# Patient Record
Sex: MALE | Race: WHITE
[De-identification: names, ages, dates, MRNs, and addresses within clinical notes are randomized per-mention and may not be internally consistent; named-entity substitution may affect disease eponyms.]

---

## 2017-06-15 ENCOUNTER — HOSPITAL ENCOUNTER (EMERGENCY)
Dept: HOSPITAL 74 - FER | Age: 82
Discharge: HOME | End: 2017-06-15
Payer: COMMERCIAL

## 2017-06-15 VITALS — BODY MASS INDEX: 32.5 KG/M2

## 2017-06-15 VITALS — HEART RATE: 67 BPM | DIASTOLIC BLOOD PRESSURE: 90 MMHG | SYSTOLIC BLOOD PRESSURE: 183 MMHG | TEMPERATURE: 97.8 F

## 2017-06-15 DIAGNOSIS — L08.9: Primary | ICD-10-CM

## 2017-06-15 DIAGNOSIS — Z79.01: ICD-10-CM

## 2017-06-15 DIAGNOSIS — I10: ICD-10-CM

## 2017-06-15 DIAGNOSIS — Z95.2: ICD-10-CM

## 2017-06-15 DIAGNOSIS — Z85.46: ICD-10-CM

## 2017-06-15 DIAGNOSIS — E78.00: ICD-10-CM

## 2017-06-15 PROCEDURE — 3E0234Z INTRODUCTION OF SERUM, TOXOID AND VACCINE INTO MUSCLE, PERCUTANEOUS APPROACH: ICD-10-PCS

## 2017-06-15 NOTE — PDOC
History of Present Illness





- General


Chief Complaint: Injury


Stated Complaint: INJURY FINGER


Time Seen by Provider: 06/15/17 09:33


History Source: Patient


Exam Limitations: No Limitations





- History of Present Illness


Initial Comments: 





06/15/17 10:23





81-year-old male with past medical history of hypertension, hyperlipidemia, 

prosthetic aortic valve presents with right fourth digit injury. He reports 

this occurred approximately one week ago. States that it object had actually 

crushed his fourth finger. He has sustained an injury but he put a bandage on 

it. Did not see a doctor or what the hospital at that time. Noticed today that 

the symptoms were getting worse and called his doctor who advised patient go to 

the ER. Patient denies any fevers or chills. Denies any numbness.





Past History





- Past Medical History


Allergies/Adverse Reactions: 


 Allergies











Allergy/AdvReac Type Severity Reaction Status Date / Time


 


No Known Allergies Allergy   Verified 09/04/12 15:19











Home Medications: 


Ambulatory Orders





Amlodipine Besylate [Norvasc -] 5 mg PO DAILY #0 tablet 09/11/12 


Rosuvastatin Calcium [Crestor] 5 mg PO DAILY #0 tablet 09/11/12 


Cholecalciferol (Vitamin D3) [Vitamin D3] 1 tab PO DAILY 06/18/14 


Metoprolol Succinate [Toprol XL -] 100 mg PO DAILY 06/18/14 


Olmesartan Medoxomil [Benicar -] 20 mg PO DAILY 06/18/14 


Amox-Tr/K Cl [Augmentin - 875Mg Tablet] 1 tab PO BID #20 tablet 06/15/17 


Apixaban [Eliquis] 5 mg PO DAILY 06/15/17 








Anemia: No


Asthma: No


Cancer: Yes (PROSTATE CA)


Cardiac Disorders: Yes (Aortic Valve Prothestic,Bovine)


CVA: No


COPD: No


CHF: No


Dementia: No


Diabetes: No


GI Disorders: No


 Disorders: No


HTN: Yes


Hypercholesterolemia: Yes


Liver Disease: No


Seizures: No


Thyroid Disease: No





- Surgical History


Abdominal Surgery: Yes (umbilical hernia)


Cardiac Surgery: Yes (AORTIC VALVE REPLACEMENT)


Lung Surgery: No


Neurologic Surgery: No


Orthopedic Surgery: No





- Psycho/Social/Smoking Cessation Hx


Anxiety: No


Suicidal Ideation: No


Smoking Status: No


Smoking History: Unknown if ever smoked


Have you smoked in the past 12 months: No


Number of Cigarettes Smoked Daily: 0


Information on smoking cessation initiated: No


Hx Alcohol Use: No


Drug/Substance Use Hx: No


Substance Use Type: None


Hx Substance Use Treatment: No





**Review of Systems





- Review of Systems


Able to Perform ROS?: Yes


Comments:: 





06/15/17 10:24





GENERAL/CONSTITUTIONAL: No fever, weakness. 


HEAD, EYES, EARS, NOSE AND THROAT: No change in vision. No ear pain or 

discharge. No sore throat.


CARDIOVASCULAR: No chest pain or shortness of breath.


RESPIRATORY: No cough, wheezing, or hemoptysis.


GASTROINTESTINAL: No abdominal pain, nausea, vomiting, diarrhea, or decreased 

PO intolerance. 


GENITOURINARY: No dysuria, frequency, or change in urination.


MUSCULOSKELETAL: No joint or muscle swelling or pain. No neck or back pain.


SKIN: right fourth digit injury


NEUROLOGIC: No headache, vertigo, loss of consciousness, or change in strength/

sensation.


ENDOCRINE: No increased thirst. No abnormal weight change.


HEMATOLOGIC/LYMPHATIC: No anemia, easy bleeding, or history of blood clots.


ALLERGIC/IMMUNOLOGIC: No hives or skin allergy. 





*Physical Exam





- Vital Signs


 Last Vital Signs











Temp Pulse Resp BP Pulse Ox


 


 97.8 F   67   16   183/90   98 


 


 06/15/17 09:29  06/15/17 09:29  06/15/17 09:29  06/15/17 09:29  06/15/17 09:29














- Physical Exam


Comments: 





06/15/17 10:24





GENERAL: Awake, alert, and fully oriented, in no acute distress. 


HEAD: No signs of trauma


EYES: PERRLA, EOMI, sclera anicteric, conjunctiva clear


ENT: Auricles normal inspection, hearing grossly normal, nares patent, 

oropharynx clear without exudates.  


NECK: Normal ROM, supple, no lymphadenopathy, JVD, or masses


LUNGS: Breath sounds equal, clear to auscultation bilaterally.  No wheezes, and 

no crackles


HEART: Regular rate and rhythm, normal S1 and S2, no murmurs, rubs or gallops


ABDOMEN: Soft, nontender, normoactive bowel sounds.  No guarding, no rebound.  

No masses


EXTREMITIES: Normal range of motion, no edema.  No clubbing or cyanosis. No 

cords, erythema, or tenderness


2+ radial pulse. Sensation intact throughout. Proximal nailbed avulsion from 

cuticle exposed. ~1 cm superficial proximal nailbed laceration. Foul smelling 

with mild drainage. Surrounding erythema noted. Rest of nailbed intact and 

adherent to distal digit. Pt able to flex and extend digit.


NEUROLOGICAL: Cranial nerves II through XII grossly intact.  Normal speech, 

normal gait


SKIN: Warm, Dry, normal turgor, no rashes or lesions noted.





ED Treatment Course





- RADIOLOGY


Radiology Studies Ordered: 














 Category Date Time Status


 


 FINGER(S) RIGHT [RAD] Stat Radiology  06/15/17 09:35 Taken














- Medications


Given in the ED: 


ED Medications














Discontinued Medications














Generic Name Dose Route Start Last Admin





  Trade Name Hakeemq  PRN Reason Stop Dose Admin


 


Amoxicillin/Clavulanate Potassium  1 tab 06/15/17 10:00 06/15/17 10:15





  Augmentin - 875mg Tablet  PO 06/15/17 10:01  1 tab





  ONCE ONE   Administration


 


Diphtheria/Tetanus/Acell Pertussis  0.5 ml 06/15/17 09:35 06/15/17 10:15





  Boostrix -  IM 06/15/17 09:36  0.5 ml





  .ONCE ONE   Administration














Medical Decision Making





- Medical Decision Making





06/15/17 10:31





 Vital Signs











Temp Pulse Resp BP Pulse Ox


 


 97.8 F   67   16   183/90   98 


 


 06/15/17 09:29  06/15/17 09:29  06/15/17 09:29  06/15/17 09:29  06/15/17 09:29








Pt with distal nailbed and 4th digit injury to dominant hand.


No apparent tendon injury with full flexion and extension.


Given injury occurred several days ago, pt will likely need to heal by 

secondary intention.


The wound appears to be infected at this time.


Patient overall nontoxic appearing though


Xray obtained of 4th digit. Pending official read. No fractures appreciated but 

noted with skin defect.


Tetanus ordered.


Case discussed with DR. Ferrera (hand surgeon).


Recommends that I initiate augmentin BID and hand soaks TID (half water/half 

hydrogen peroxide) and have the patient follow up in his [place] office 

tomorrow.





Pt verbalises understanding and agrees with plan.





I discussed the physical exam findings, ancillary test results and final 

diagnoses with the patient.  I answered all of the patient's questions.  The 

patient was satisfied with the care received and felt comfortable with the 

discharge plan and treatment plan.  The patient will call their primary care 

physician within 24 hours to arrange follow-up and will return to the Emergency 

Department with any new, persistant or worsening symptoms. 





*DC/Admit/Observation/Transfer


Diagnosis at time of Disposition: 


 Finger infection





- Discharge Dispostion


Disposition: HOME


Condition at time of disposition: Stable


Admit: No





- Prescriptions


Prescriptions: 


Amox-Tr/K Cl [Augmentin - 875Mg Tablet] 1 tab PO BID #20 tablet





- Referrals


Referrals: 


Nader Ferrera MD [Staff Physician] - 





- Patient Instructions


Printed Discharge Instructions:  DI for Nail Avulsion Injury


Additional Instructions: 


Your finger is infected.


I have discussed the case with the hand specialist DR. Ferrera.


He requests that he sees you in his office tomorrow morning.


Please call today to schedule an appointment for tomorrow.


When you speak to the , inform them that I have spoken to DR. Ferrera, and that DR. Ferrera is requesting that you are seen tomorrow.


Take the augmentin every 12 hours as prescribed.


Complete the course.


You may take 650 mg tylenol every 4 hours as needed for pain.


Soak your finger in a cup of half water and half hydrogen peroxide for 30 

minutes, three times a day.


Elevate the hand to decrease the swelling

## 2018-01-16 ENCOUNTER — HOSPITAL ENCOUNTER (EMERGENCY)
Dept: HOSPITAL 74 - JER | Age: 83
Discharge: HOME | End: 2018-01-16
Payer: COMMERCIAL

## 2018-01-16 VITALS — SYSTOLIC BLOOD PRESSURE: 151 MMHG | TEMPERATURE: 98 F | DIASTOLIC BLOOD PRESSURE: 94 MMHG | HEART RATE: 76 BPM

## 2018-01-16 VITALS — BODY MASS INDEX: 30.8 KG/M2

## 2018-01-16 DIAGNOSIS — K52.9: Primary | ICD-10-CM

## 2018-01-16 DIAGNOSIS — I10: ICD-10-CM

## 2018-01-16 DIAGNOSIS — E78.5: ICD-10-CM

## 2018-01-16 LAB
ALBUMIN SERPL-MCNC: 3.7 G/DL (ref 3.4–5)
ALP SERPL-CCNC: 88 U/L (ref 45–117)
ALT SERPL-CCNC: 18 U/L (ref 12–78)
ANION GAP SERPL CALC-SCNC: 10 MMOL/L (ref 8–16)
AST SERPL-CCNC: 14 U/L (ref 15–37)
BASOPHILS # BLD: 0.5 % (ref 0–2)
BILIRUB SERPL-MCNC: 1 MG/DL (ref 0.2–1)
BUN SERPL-MCNC: 19 MG/DL (ref 7–18)
CALCIUM SERPL-MCNC: 8.9 MG/DL (ref 8.5–10.1)
CHLORIDE SERPL-SCNC: 109 MMOL/L (ref 98–107)
CO2 SERPL-SCNC: 25 MMOL/L (ref 21–32)
CREAT SERPL-MCNC: 1.2 MG/DL (ref 0.7–1.3)
DEPRECATED RDW RBC AUTO: 12.8 % (ref 11.9–15.9)
EOSINOPHIL # BLD: 0.3 % (ref 0–4.5)
GLUCOSE SERPL-MCNC: 125 MG/DL (ref 74–106)
HCT VFR BLD CALC: 50.8 % (ref 35.4–49)
HGB BLD-MCNC: 17 GM/DL (ref 11.7–16.9)
LIPASE SERPL-CCNC: 250 U/L (ref 73–393)
LYMPHOCYTES # BLD: 2.3 % (ref 8–40)
MCH RBC QN AUTO: 30.8 PG (ref 25.7–33.7)
MCHC RBC AUTO-ENTMCNC: 33.4 G/DL (ref 32–35.9)
MCV RBC: 92.3 FL (ref 80–96)
MONOCYTES # BLD AUTO: 2.8 % (ref 3.8–10.2)
NEUTROPHILS # BLD: 94.1 % (ref 42.8–82.8)
PLATELET # BLD AUTO: 185 K/MM3 (ref 134–434)
PMV BLD: 7.7 FL (ref 7.5–11.1)
POTASSIUM SERPLBLD-SCNC: 4 MMOL/L (ref 3.5–5.1)
PROT SERPL-MCNC: 7.1 G/DL (ref 6.4–8.2)
RBC # BLD AUTO: 5.5 M/MM3 (ref 4–5.6)
SODIUM SERPL-SCNC: 144 MMOL/L (ref 136–145)
WBC # BLD AUTO: 7 K/MM3 (ref 4–10)

## 2018-01-16 NOTE — PDOC
History of Present Illness





- General


Chief Complaint: Vomiting/Diarrhea


Stated Complaint: DIARRHEA/VOMITING


Time Seen by Provider: 01/16/18 15:47





- History of Present Illness


Initial Comments: 


81-year-old male with past medical history of hypertension, hyperlipidemia, 

prosthetic aortic valve (on Apixaban) presents prsenting with nausea, vomiting, 

and diarrhea since this morning. States that it started at 6:00 this AM with a 6

/10 crampy generalized abdominal pain for which he immediately went to the 

bathroom and had multiple bouts of NBNB N/V/D and eventually a frontal headache 

described as 3/10 in severity, non-radiating and without visual symptoms, focal 

neurological deficits, or gait issues. Denies any extraordinary food ingestion, 

or recent sick symptoms. Of note, he just suffered the loss of a close friend 

and admits to some sadness but no significant distress or symptomatic 

depression.


01/16/18 16:30








Past History





- Past Medical History


Allergies/Adverse Reactions: 


 Allergies











Allergy/AdvReac Type Severity Reaction Status Date / Time


 


No Known Allergies Allergy   Verified 01/16/18 15:49











Home Medications: 


Ambulatory Orders





Amlodipine Besylate [Norvasc -] 5 mg PO DAILY #0 tablet 09/11/12 


Rosuvastatin Calcium [Crestor] 5 mg PO DAILY #0 tablet 09/11/12 


Cholecalciferol (Vitamin D3) [Vitamin D3] 1 tab PO DAILY 06/18/14 


Metoprolol Succinate [Toprol XL -] 100 mg PO DAILY 06/18/14 


Olmesartan Medoxomil [Benicar -] 20 mg PO DAILY 06/18/14 


Amox-Tr/K Cl [Augmentin - 875Mg Tablet] 1 tab PO BID #20 tablet 06/15/17 


Apixaban [Eliquis] 5 mg PO DAILY 06/15/17 


Ondansetron [Zofran *Odt*] 8 mg SL BID #14 od.tablet 01/16/18 








Anemia: No


Asthma: No


Cancer: Yes (PROSTATE CA)


Cardiac Disorders: Yes (Aortic Valve Prothestic,Bovine)


CVA: No


COPD: No


CHF: No


DVT: No


Dementia: No


Diabetes: No


GI Disorders: No


 Disorders: No


HTN: Yes


Hypercholesterolemia: Yes


Liver Disease: No


Seizures: No


Thyroid Disease: No





- Surgical History


Abdominal Surgery: Yes (umbilical hernia)


Cardiac Surgery: Yes (AORTIC VALVE REPLACEMENT)


Lung Surgery: No


Neurologic Surgery: No


Orthopedic Surgery: No





- Immunization History


Immunization Up to Date: Yes





- Suicide/Smoking/Psychosocial Hx


Smoking Status: No


Smoking History: Unknown if ever smoked


Have you smoked in the past 12 months: No


Number of Cigarettes Smoked Daily: 0


Information on smoking cessation initiated: No


Hx Alcohol Use: No


Drug/Substance Use Hx: No


Substance Use Type: None


Hx Substance Use Treatment: No





**Review of Systems





- Review of Systems


Constitutional: No: Chills, Diaphoresis, Fever


HEENTM: No: Eye Pain, Blurred Vision, Double Vision


Respiratory: No: Cough, Orthopnea, Shortness of Breath, Wheezing


Cardiac (ROS): No: Chest Pain, Edema


ABD/GI: Yes: Diarrhea, Nausea, Vomiting.  No: Constipated


: No: Dysuria


Musculoskeletal: No: Back Pain, Gout, Joint Pain


Integumentary: No: Bruising, Change in Color


Neurological: Yes: Headache.  No: Numbness, Paresthesia, Seizure, Tingling, 

Weakness


Psychiatric: No: Anxiety, Depression


Endocrine: No: Excessive Sweating





*Physical Exam





- Vital Signs


 Last Vital Signs











Temp Pulse Resp BP Pulse Ox


 


 98.0 F   81   17   181/106   96 


 


 01/16/18 15:49  01/16/18 15:49  01/16/18 15:49  01/16/18 15:49  01/16/18 15:49














- Physical Exam


General Appearance: Yes: Nourished, Appropriately Dressed.  No: Apparent 

Distress


HEENT: positive: EOMI, ELIZABETH, Normal ENT Inspection, Normal Voice


Neck: positive: Trachea midline, Normal Thyroid, Supple.  negative: Tender, 

Rigid


Respiratory/Chest: positive: Lungs Clear, Normal Breath Sounds.  negative: 

Chest Tender, Respiratory Distress, Accessory Muscle Use


Cardiovascular: positive: Regular Rhythm, Regular Rate


Gastrointestinal/Abdominal: positive: Normal Bowel Sounds, Flat, Soft.  negative

: Tender


Musculoskeletal: positive: Normal Inspection.  negative: CVA Tenderness


Extremity: positive: Normal Capillary Refill, Normal Inspection, Normal Range 

of Motion, Calf Tenderness.  negative: Tender


Integumentary: positive: Normal Color, Dry


Neurologic: positive: CNs II-XII NML intact, Fully Oriented, Alert, Normal Mood/

Affect, Normal Response, Motor Strength 5/5





ED Treatment Course





- LABORATORY


CBC & Chemistry Diagram: 


 01/16/18 16:12





 01/16/18 16:12





Medical Decision Making





- Medical Decision Making


 82 year old male with prosthetic valve (on Apixaban) presenting with N/V/D and 

abdominal pain this AM. No fevers, chills, or VS instability so overall 

presentation likely a viral gastroenteritis that has since resolved since 

receiving zofran,1 L IVNS and is now able to tolerate PO.


01/16/18 17:41





Labs all WNL, BP down to 150s with admin of home amlodipine. Will DC home with 

follow up instructions.


01/16/18 19:15








*DC/Admit/Observation/Transfer


Diagnosis at time of Disposition: 


 Gastroenteritis








- Discharge Dispostion


Disposition: HOME


Condition at time of disposition: Improved


Admit: No





- Prescriptions


Prescriptions: 


Ondansetron [Zofran *Odt*] 8 mg SL BID #14 od.tablet





- Referrals


Referrals: 


Christiana Cruz [Primary Care Provider] - 





- Patient Instructions


Printed Discharge Instructions:  DI for Viral Gastroenteritis -- Child


Additional Instructions: 


You likely have a viral stomach infection that will pass on its own. You should 

stay hydrated and use the nausea medication as instructed. Please return if you 

cannot keep your medication or food down despite using your nausea medication.





- Post Discharge Activity

## 2018-01-16 NOTE — PDOC
Attending Attestation





- Resident


Resident Name: Itzel Weller





- ED Attending Attestation


I have performed the following: I have examined & evaluated the patient, The 

case was reviewed & discussed with the resident, I agree w/resident's findings 

& plan, Exceptions are as noted





- HPI


HPI: 





01/16/18 18:21


83 yo male had nausea vomiting and diarrhea since this morning








- Physicial Exam


PE: 





01/16/18 18:22


83 yo male p/w NVD


head ncat


neck supple


cvs rrr s1s2


lungs cta b/l


abd soft,nontender,no rebound,no guarding


ext no deformity


skin warm and dry


neuro a xox3 ,ambulatory


psych appropriate








- Medical Decision Making





01/17/18 01:15


pt discharged  home

## 2018-01-16 NOTE — PDOC
Rapid Medical Evaluation


Time Seen by Provider: 01/16/18 15:47


Medical Evaluation: 


 Allergies











Allergy/AdvReac Type Severity Reaction Status Date / Time


 


No Known Allergies Allergy   Verified 09/04/12 15:19











01/16/18 15:48


I have performed a brief in-person evaluation of this patient.





The patient presents with a chief complaint of nausea, vomiting diarrhea


and a headache since this am . Patient states unable to keep food down





Pertinent physical exam findings:


NAD


lungs clear bilaterally


heart s1s2


abdomen with + bowel sounds no focal tenderness





I have ordered the following:


labs ordered





The patient will proceed to the Ed for further evaluation.

## 2018-01-26 ENCOUNTER — HOSPITAL ENCOUNTER (INPATIENT)
Dept: HOSPITAL 74 - FER | Age: 83
LOS: 4 days | Discharge: HOME | DRG: 194 | End: 2018-01-30
Attending: NURSE PRACTITIONER | Admitting: INTERNAL MEDICINE
Payer: COMMERCIAL

## 2018-01-26 VITALS — BODY MASS INDEX: 31.1 KG/M2

## 2018-01-26 DIAGNOSIS — E87.1: ICD-10-CM

## 2018-01-26 DIAGNOSIS — E86.0: ICD-10-CM

## 2018-01-26 DIAGNOSIS — J11.00: Primary | ICD-10-CM

## 2018-01-26 DIAGNOSIS — D72.819: ICD-10-CM

## 2018-01-26 DIAGNOSIS — B34.9: ICD-10-CM

## 2018-01-26 DIAGNOSIS — Z95.2: ICD-10-CM

## 2018-01-26 DIAGNOSIS — Z85.46: ICD-10-CM

## 2018-01-26 DIAGNOSIS — E78.5: ICD-10-CM

## 2018-01-26 DIAGNOSIS — I10: ICD-10-CM

## 2018-01-26 DIAGNOSIS — D69.6: ICD-10-CM

## 2018-01-26 LAB
ALBUMIN SERPL-MCNC: 3.9 G/DL (ref 3.5–5)
ALP SERPL-CCNC: 70 U/L (ref 32–92)
ALT SERPL-CCNC: 18 U/L (ref 10–40)
ANION GAP SERPL CALC-SCNC: 3 MMOL/L (ref 8–16)
AST SERPL-CCNC: 25 U/L (ref 10–42)
BACTERIA #/AREA URNS HPF: (no result) /HPF
BASOPHILS # BLD: 3.3 % (ref 0–2)
BILIRUB SERPL-MCNC: 0.5 MG/DL (ref 0.2–1)
BUN SERPL-MCNC: 17 MG/DL (ref 7–18)
CALCIUM SERPL-MCNC: 9.1 MG/DL (ref 8.4–10.2)
CHLORIDE SERPL-SCNC: 103 MMOL/L (ref 98–107)
CO2 SERPL-SCNC: 27 MMOL/L (ref 22–28)
COLOR UR: YELLOW
CREAT SERPL-MCNC: 1.3 MG/DL (ref 0.6–1.3)
DEPRECATED RDW RBC AUTO: 11.9 % (ref 11.9–15.9)
EOSINOPHIL # BLD: 1.5 % (ref 0–4.5)
GLUCOSE SERPL-MCNC: 113 MG/DL (ref 74–106)
HCT VFR BLD CALC: 46.8 % (ref 35.4–49)
HGB BLD-MCNC: 15.4 GM/DL (ref 11.7–16.9)
LYMPHOCYTES # BLD: 8 % (ref 8–40)
MCH RBC QN AUTO: 30.5 PG (ref 25.7–33.7)
MCHC RBC AUTO-ENTMCNC: 32.9 G/DL (ref 32–35.9)
MCV RBC: 92.7 FL (ref 80–96)
MONOCYTES # BLD AUTO: 9.6 % (ref 3.8–10.2)
NEUTROPHILS # BLD: 77.6 % (ref 42.8–82.8)
PH UR: 6.5 [PH] (ref 4.5–8)
PLATELET # BLD AUTO: 174 K/MM3 (ref 134–434)
PMV BLD: 7.9 FL (ref 7.5–11.1)
POTASSIUM SERPLBLD-SCNC: 4.4 MMOL/L (ref 3.5–5.1)
PROT SERPL-MCNC: 6.5 G/DL (ref 6.4–8.3)
RBC # BLD AUTO: (no result) /HPF (ref 0–3)
RBC # BLD AUTO: 5.05 M/MM3 (ref 4–5.6)
SODIUM SERPL-SCNC: 133 MMOL/L (ref 136–145)
SP GR UR: 1.02 (ref 1–1.02)
UROBILINOGEN UR STRIP-MCNC: 0.2 MG/DL (ref 0.2–1)
WBC # BLD AUTO: 6.7 K/MM3 (ref 4–10.8)
WBC # UR AUTO: (no result) /UL (ref 0–2)

## 2018-01-26 PROCEDURE — G0378 HOSPITAL OBSERVATION PER HR: HCPCS

## 2018-01-26 NOTE — HP
Admitting History and Physical





- Primary Care Physician


PCP: Christiana Cruz





- Admission


Chief Complaint: Fever,Chills, increased Confusion


History of Present Illness: 





This is a 81 y/o man who presents to the ED with fever, chills, lethargy since 

today. Patient's son reports his dad is acting confused and not at his usual 

baseline. Patient denies numbness, tingling, facial droop, slurred speech. 

Patient denies cough, SOB, dizziness, HA, CP, AP, N/V/D, constipation, dysuria





History Source: Family Member


Limitations to Obtaining History: Clinical Condition, Poor Historian





- Past Medical History


Cardiovascular: Yes: Aortic Insufficiency, HTN, Hyperlipdemia


Renal/: Yes: Cancer (prostate (seed implant))


Infectious Disease: Yes: MRSA (perianal abcess)





- Past Surgical History


Past Surgical History: Yes: Valve Replacement (aortic (on Eliquis))





- Smoking History


Smoking history: Never smoked


Have you smoked in the past 12 months: No


Aproximately how many cigarettes per day: 0





- Alcohol/Substance Use


Hx Alcohol Use: No


History of Substance Use: reports: None





- Social History


Usual Living Arrangement: Yes: Alone


ADL: Independent


History of Recent Travel: No





Home Medications





- Allergies


Allergies/Adverse Reactions: 


 Allergies











Allergy/AdvReac Type Severity Reaction Status Date / Time


 


No Known Allergies Allergy   Verified 01/16/18 15:49














- Home Medications


Home Medications: 


Ambulatory Orders





Rosuvastatin Calcium [Crestor] 5 mg PO DAILY #0 tablet 09/11/12 


Cholecalciferol (Vitamin D3) [Vitamin D3] 1 tab PO DAILY 06/18/14 


Metoprolol Succinate [Toprol XL -] 100 mg PO DAILY 06/18/14 


Apixaban [Eliquis] 5 mg PO DAILY 06/15/17 


Amiodarone HCl 100 mg PO DAILY 01/16/18 


Amlodipine Besylate [Norvasc -] 10 mg PO DAILY 01/16/18 


Furosemide 20 mg PO DAILY 01/16/18 


Olmesartan Medoxomil [Benicar] 20 mg PO DAILY 01/16/18 











Family Disease History





- Family Disease History


Family History: Unable to Obtain





Review of Systems





- Review of Systems


Constitutional: reports: Chills, Fever, Lethargy


Eyes: reports: No Symptoms


HENT: reports: No Symptoms


Neck: reports: No Symptoms


Cardiovascular: reports: No Symptoms


Respiratory: reports: No Symptoms


Gastrointestinal: reports: No Symptoms


Genitourinary: reports: No Symptoms


Breasts: reports: No Symptoms Reported


Musculoskeletal: reports: No Symptoms


Integumentary: reports: No Symptoms


Neurological: reports: Confusion (per son)


Endocrine: reports: No Symptoms


Hematology/Lymphatic: reports: No Symptoms


Psychiatric: reports: No Symptoms





Physical Examination


Vital Signs: 


 Vital Signs











Temperature  99.2 F   01/26/18 20:55


 


Pulse Rate  70   01/26/18 20:55


 


Respiratory Rate  20   01/26/18 20:55


 


Blood Pressure  176/90   01/26/18 20:55


 


O2 Sat by Pulse Oximetry (%)  95   01/26/18 20:55











Constitutional: Yes: Well Nourished, Obese


Eyes: Yes: WNL, Conjunctiva Clear, EOM Intact, PERRL


HENT: Yes: WNL, Atraumatic, Normocephalic


Neck: Yes: WNL, Supple, Trachea Midline


Cardiovascular: Yes: Regular Rate and Rhythm, Murmur, S1, S2


Respiratory: Yes: Diminished (bases), Wheezes (scattered)


Gastrointestinal: Yes: Normal Bowel Sounds, Abdomen, Obese


...Rectal Exam: Yes: Deferred


Renal/: Yes: WNL


Breast(s): Yes: WNL


Musculoskeletal: Yes: WNL


Extremities: Yes: WNL


Edema: No


Peripheral Pulses WNL: Yes


Integumentary: Yes: WNL


Neurological: Yes: WNL, Alert, Confusion, Cran Nerves II-XII Intact


...Motor Strength: WNL


Psychiatric: Yes: WNL, Alert


Labs: 


 CBC, BMP





 01/26/18 21:30 





 01/26/18 21:30 





 Laboratory Results - last 24 hr











  01/26/18 01/26/18 01/26/18





  21:30 21:30 21:30


 


WBC  6.7  


 


RBC  5.05  


 


Hgb  15.4  


 


Hct  46.8  


 


MCV  92.7  


 


MCH  30.5  


 


MCHC  32.9  


 


RDW  11.9  


 


Plt Count  174  


 


MPV  7.9  


 


Neutrophils %  77.6  


 


Lymphocytes %  8.0  


 


Monocytes %  9.6  


 


Eosinophils %  1.5  


 


Basophils %  3.3 H  


 


Sodium   133 L 


 


Potassium   4.4 


 


Chloride   103 


 


Carbon Dioxide   27 


 


Anion Gap   3 L 


 


BUN   17 


 


Creatinine   1.3 


 


Creat Clearance w eGFR   53.12 


 


Random Glucose   113 H 


 


Lactic Acid    1.0


 


Calcium   9.1 


 


Total Bilirubin   0.5 


 


AST   25 


 


ALT   18 


 


Alkaline Phosphatase   70 


 


Total Protein   6.5 


 


Albumin   3.9 


 


Urine Color   


 


Urine Appearance   


 


Urine pH   


 


Ur Specific Gravity   


 


Urine Protein   


 


Urine Glucose (UA)   


 


Urine Ketones   


 


Urine Blood   


 


Urine Nitrite   


 


Urine Bilirubin   


 


Urine Urobilinogen   


 


Ur Leukocyte Esterase   


 


Urine RBC   


 


Urine WBC   


 


Urine Bacteria   














  01/26/18





  23:26


 


WBC 


 


RBC 


 


Hgb 


 


Hct 


 


MCV 


 


MCH 


 


MCHC 


 


RDW 


 


Plt Count 


 


MPV 


 


Neutrophils % 


 


Lymphocytes % 


 


Monocytes % 


 


Eosinophils % 


 


Basophils % 


 


Sodium 


 


Potassium 


 


Chloride 


 


Carbon Dioxide 


 


Anion Gap 


 


BUN 


 


Creatinine 


 


Creat Clearance w eGFR 


 


Random Glucose 


 


Lactic Acid 


 


Calcium 


 


Total Bilirubin 


 


AST 


 


ALT 


 


Alkaline Phosphatase 


 


Total Protein 


 


Albumin 


 


Urine Color  Yellow


 


Urine Appearance  Clear


 


Urine pH  6.5


 


Ur Specific Gravity  1.020


 


Urine Protein  Trace


 


Urine Glucose (UA)  Negative


 


Urine Ketones  Negative


 


Urine Blood  Trace-intact H


 


Urine Nitrite  Negative


 


Urine Bilirubin  Negative


 


Urine Urobilinogen  0.2


 


Ur Leukocyte Esterase  Negative


 


Urine RBC  2-4


 


Urine WBC  0-2


 


Urine Bacteria  Few








 Intake & Output











 01/24/18 01/25/18 01/26/18 01/27/18





 23:59 23:59 23:59 23:59


 


Intake Total    1350


 


Output Total    300


 


Balance    1050


 


Weight   103.419 kg 98.475 kg














Imaging





- Results


Chest X-ray: Image Reviewed


EKG: Image Reviewed





Problem List





- Problems


(1) Pneumonia


Assessment/Plan: 


- Likely CAP vs HAP


- CURB65 Score- 2


- Chest xray- ? retrograde opacity


- Blood Cultures- pending


- Urine Cultures- pending


- Urine Legionella- pending


- Rapid flu- negative, given Tamiflu x1 in ED


- Zosyn, Vancomycin given in ED


- Will start Doxycycline, Ceftriaxone instead of Azithromycin 2/2 ECG- 

prolonged QT


- Monitor CBC


- Monitor vitals











Code(s): J18.9 - PNEUMONIA, UNSPECIFIED ORGANISM   


Qualifiers: 


   Pneumonia type: due to unspecified organism   Laterality: unspecified 

laterality   Lung location: lower lobe of lung   Qualified Code(s): J18.1 - 

Lobar pneumonia, unspecified organism   





(2) HTN (hypertension)


Assessment/Plan: 


- Controlled


- Monitor BP


- Continue Norvasc, Toprol Xl, Benicar with parameters


- Monitor renal function





Code(s): I10 - ESSENTIAL (PRIMARY) HYPERTENSION   





(3) HLD (hyperlipidemia)


Assessment/Plan: 


- Continue Crestor


- Monitor LFTs





Code(s): E78.5 - HYPERLIPIDEMIA, UNSPECIFIED   





(4) S/P aortic valve replacement


Assessment/Plan: 


- Continue Eliquis


Code(s): Z95.2 - PRESENCE OF PROSTHETIC HEART VALVE   





(5) DVT prophylaxis


Assessment/Plan: 


- OOB


- SCD


- Continue Eliqus


Code(s): KTD6187 -    





Assessment/Plan





This is a 81 y/o man with a PMHx of HTN, HLD, s/p AVR (on Eliquis), Prostate Ca 

(seed implant). Placed in Observation Pneumonia, weakness








FEN


- PO Fluids


- Replete lytes prn


- Low Na Diet








Code Status: Full Code








Dispo: Observation





Visit type





- Emergency Visit


Emergency Visit: Yes


ED Registration Date: 01/26/18


Care time: The patient presented to the Emergency Department on the above date 

and was hospitalized for further evaluation of their emergent condition.





- New Patient


This patient is new to me today: Yes


Date on this admission: 01/26/18





- Critical Care


Critical Care patient: No

## 2018-01-26 NOTE — PDOC
History of Present Illness





- General


History Source: Patient


Exam Limitations: No Limitations





- History of Present Illness


Initial Comments: 





01/26/18 21:27


The patient is an 82 year old male with pmhx of hypertension, hyperlipidemia, s/

p aortic valve replacement (on Eliquis), and prostate CA who presents to the ED 

with complaints of subjective fever for the past few days. He states his 

children sent him in because of the dizziness and chills he has been having. He 

states that when he stands up or changes position he becomes dizzy as if the 

room is spinning. He additionally complains of being more tired than usual. He 

reports being seen 10 days ago in the ED for a stomach illness which had 

resolved. The patient denies any nausea, vomiting, diarrhea, cough, SOB, CP, or 

urinary symptoms. 





<Kenna Hardy - Last Filed: 01/26/18 21:27>





<Santhosh Samson - Last Filed: 01/27/18 02:30>





- General


Chief Complaint: Lightheaded


Stated Complaint: CHILLS, DIZZINESS





Past History





<Kenna Hardy - Last Filed: 01/26/18 21:27>





- Past Medical History


Anemia: No


Asthma: No


Cancer: Yes (PROSTATE CA)


Cardiac Disorders: Yes (Aortic Valve Prothestic,Bovine)


CVA: No


COPD: No


CHF: No


DVT: No


Dementia: No


Diabetes: No


GI Disorders:  (RECENT GASTROENTERITIS)


 Disorders: No


HTN: Yes


Hypercholesterolemia: Yes


Liver Disease: No


Seizures: No


Thyroid Disease: No





- Surgical History


Abdominal Surgery: Yes (umbilical hernia)


Cardiac Surgery: Yes (AORTIC VALVE REPLACEMENT)


Lung Surgery: No


Neurologic Surgery: No


Orthopedic Surgery: No





- Immunization History


Immunization Up to Date: Yes





- Suicide/Smoking/Psychosocial Hx


Smoking Status: No


Smoking History: Never smoked


Have you smoked in the past 12 months: No


Number of Cigarettes Smoked Daily: 0


Hx Alcohol Use: No


Drug/Substance Use Hx: No


Substance Use Type: None


Hx Substance Use Treatment: No





<Santhosh Samson - Last Filed: 01/27/18 02:30>





- Past Medical History


Allergies/Adverse Reactions: 


 Allergies











Allergy/AdvReac Type Severity Reaction Status Date / Time


 


No Known Allergies Allergy   Verified 01/16/18 15:49











Home Medications: 


Ambulatory Orders





Rosuvastatin Calcium [Crestor] 5 mg PO DAILY #0 tablet 09/11/12 


Cholecalciferol (Vitamin D3) [Vitamin D3] 1 tab PO DAILY 06/18/14 


Metoprolol Succinate [Toprol XL -] 100 mg PO DAILY 06/18/14 


Apixaban [Eliquis] 5 mg PO DAILY 06/15/17 


Amiodarone HCl 100 mg PO DAILY 01/16/18 


Amlodipine Besylate [Norvasc -] 10 mg PO DAILY 01/16/18 


Furosemide 20 mg PO DAILY 01/16/18 


Olmesartan Medoxomil [Benicar] 20 mg PO DAILY 01/16/18 











**Review of Systems





- Review of Systems


Able to Perform ROS?: Yes


Comments:: 





01/26/18 21:27


GENERAL/CONSTITUTIONAL: 


Present: fever, chills, weakness 


HEAD, EYES, EARS, NOSE AND THROAT: No change in vision. No ear pain or 

discharge. No sore throat.


CARDIOVASCULAR: No chest pain or shortness of breath.


RESPIRATORY: No cough, wheezing, or hemoptysis.


GASTROINTESTINAL: No nausea, vomiting, diarrhea or constipation.


GENITOURINARY: No dysuria, frequency, or change in urination.


MUSCULOSKELETAL: No joint or muscle swelling or pain. No neck or back pain.


SKIN: No rash


NEUROLOGIC: 


Present: dizziness 


No headache, loss of consciousness, or change in strength/sensation.


ENDOCRINE: No increased thirst. No abnormal weight change.


HEMATOLOGIC/LYMPHATIC: No anemia, easy bleeding, or history of blood clots.


ALLERGIC/IMMUNOLOGIC: No hives or skin allergy.





All Other Systems: Reviewed and Negative





<Kenna Hardy - Last Filed: 01/26/18 21:27>





*Physical Exam





- Vital Signs


 Last Vital Signs











Temp Pulse Resp BP Pulse Ox


 


 99.2 F   70   20   176/90   95 


 


 01/26/18 20:55  01/26/18 20:55  01/26/18 20:55  01/26/18 20:55  01/26/18 20:55














- Physical Exam


Comments: 





01/26/18 21:28


GENERAL: Awake, alert, and fully oriented, in no acute distress


HEAD: No signs of trauma


EYES: PERRLA, EOMI, sclera anicteric, conjunctiva clear


ENT: Auricles normal inspection, hearing grossly normal, nares patent, 

oropharynx clear without 


exudates. Moist mucosa


NECK: Normal ROM, supple, no lymphadenopathy, JVD, or masses


LUNGS: Breath sounds equal, clear to auscultation bilaterally.  No wheezes, and 

no crackles


HEART: Regular rate and rhythm, normal S1 and S2, no murmurs, rubs or gallops


ABDOMEN: Soft, nontender, normoactive bowel sounds.  No guarding, no rebound.  

No masses


EXTREMITIES: Normal range of motion, no edema.  No clubbing or cyanosis. No 

cords, erythema, or tenderness


NEUROLOGICAL: Cranial nerves II through XII grossly intact.  Normal speech, 

normal gait


SKIN: Warm, Dry, normal turgor, no rashes or lesions noted.








<Kenna Hardy - Last Filed: 01/26/18 21:27>





- Vital Signs


 Last Vital Signs











Temp Pulse Resp BP Pulse Ox


 


 99.2 F   70   20   176/90   95 


 


 01/26/18 20:55  01/26/18 20:55  01/26/18 20:55  01/26/18 20:55  01/26/18 20:55














<Santhosh Samson - Last Filed: 01/27/18 02:30>





ED Treatment Course





- LABORATORY


CBC & Chemistry Diagram: 


 01/26/18 21:30





 01/26/18 21:30





<Santhosh Samson - Last Filed: 01/27/18 02:30>





Medical Decision Making





- Medical Decision Making





01/27/18 02:29


CXR--retrocardiac opacity, as read by me, referred to radiology for definitive 

read





a/p nosocomial pna (Overnight ED stay 10 days ago)


broad spectrum abx


fluid resusc





<Santhosh Samson - Last Filed: 01/27/18 02:30>





*DC/Admit/Observation/Transfer





- Attestations


Scribe Attestion: 





01/26/18 21:28


Documentation prepared by Kenna Hardy, acting as medical scribe for 

Santhosh Samson MD/DO.





<Kenna Hardy - Last Filed: 01/26/18 21:27>





- Discharge Dispostion


Admit: Yes





<Santhosh Samson - Last Filed: 01/27/18 02:30>


Diagnosis at time of Disposition: 


Pneumonia


Qualifiers:


 Pneumonia type: due to unspecified organism Laterality: unspecified laterality 

Lung location: lower lobe of lung Qualified Code(s): J18.1 - Lobar pneumonia, 

unspecified organism








- Discharge Dispostion


Condition at time of disposition: Stable

## 2018-01-27 LAB
ANION GAP SERPL CALC-SCNC: 6 MMOL/L (ref 8–16)
BASOPHILS # BLD: 0.9 % (ref 0–2)
BUN SERPL-MCNC: 14 MG/DL (ref 7–18)
CALCIUM SERPL-MCNC: 8.5 MG/DL (ref 8.4–10.2)
CHLORIDE SERPL-SCNC: 100 MMOL/L (ref 98–107)
CO2 SERPL-SCNC: 27 MMOL/L (ref 22–28)
CREAT SERPL-MCNC: 1.3 MG/DL (ref 0.6–1.3)
DEPRECATED RDW RBC AUTO: 11.8 % (ref 11.9–15.9)
EOSINOPHIL # BLD: 1.1 % (ref 0–4.5)
GLUCOSE SERPL-MCNC: 102 MG/DL (ref 74–106)
HCT VFR BLD CALC: 40.4 % (ref 35.4–49)
HGB BLD-MCNC: 13.9 GM/DL (ref 11.7–16.9)
LYMPHOCYTES # BLD: 9.5 % (ref 8–40)
MAGNESIUM SERPL-MCNC: 2 MG/DL (ref 1.8–2.4)
MCH RBC QN AUTO: 32.1 PG (ref 25.7–33.7)
MCHC RBC AUTO-ENTMCNC: 34.5 G/DL (ref 32–35.9)
MCV RBC: 92.9 FL (ref 80–96)
MONOCYTES # BLD AUTO: 14.2 % (ref 3.8–10.2)
NEUTROPHILS # BLD: 74.3 % (ref 42.8–82.8)
PHOSPHATE SERPL-MCNC: 2.8 MG/DL (ref 2.5–4.6)
PLATELET # BLD AUTO: 137 K/MM3 (ref 134–434)
PMV BLD: 7.4 FL (ref 7.5–11.1)
POTASSIUM SERPLBLD-SCNC: 3.8 MMOL/L (ref 3.5–5.1)
RBC # BLD AUTO: 4.34 M/MM3 (ref 4–5.6)
SODIUM SERPL-SCNC: 133 MMOL/L (ref 136–145)
WBC # BLD AUTO: 4 K/MM3 (ref 4–10.8)

## 2018-01-27 RX ADMIN — VITAMIN D, TAB 1000IU (100/BT) SCH UNIT: 25 TAB at 10:00

## 2018-01-27 RX ADMIN — ROSUVASTATIN CALCIUM SCH MG: 5 TABLET, FILM COATED ORAL at 21:29

## 2018-01-27 RX ADMIN — SODIUM CHLORIDE SCH MLS/HR: 9 INJECTION, SOLUTION INTRAVENOUS at 18:08

## 2018-01-27 RX ADMIN — TAZOBACTAM SODIUM AND PIPERACILLIN SODIUM SCH GM: 375; 3 INJECTION, SOLUTION INTRAVENOUS at 18:09

## 2018-01-27 RX ADMIN — METOPROLOL SUCCINATE SCH MG: 100 TABLET, EXTENDED RELEASE ORAL at 10:00

## 2018-01-27 RX ADMIN — OSELTAMIVIR PHOSPHATE SCH MG: 75 CAPSULE ORAL at 21:29

## 2018-01-27 RX ADMIN — AMLODIPINE BESYLATE SCH MG: 10 TABLET ORAL at 10:00

## 2018-01-27 RX ADMIN — OSELTAMIVIR PHOSPHATE SCH MG: 75 CAPSULE ORAL at 15:00

## 2018-01-27 RX ADMIN — AMIODARONE HYDROCHLORIDE SCH MG: 200 TABLET ORAL at 10:00

## 2018-01-27 NOTE — PN
Physical Exam: 


SUBJECTIVE: Patient seen and examined. Wife and son present. Patient states he 

is weak and feels unwell. Has been coughing for several days, productive, but 

does not know color of sputum. 








OBJECTIVE:





 Vital Signs











 Period  Temp  Pulse  Resp  BP Sys/Rosales  Pulse Ox


 


 Last 24 Hr 101 rectal  57-70  18-20  121-176/54-90  95-98











GENERAL: The patient is awake, alert, and fully oriented. Ill-appearing. 


LUNGS: Audibly wheezing; anterior bilateral wheezing; diminished breath sounds 

at bases  


HEART: Regular rate and rhythm, S1, S2 


ABDOMEN: Soft, nontender, nondistended, normoactive bowel sounds, no guarding, 

no rebound, no hepatosplenomegaly, no masses.


EXTREMITIES: 2+ pulses, warm, well-perfused, no edema. 


NEUROLOGICAL: Cranial nerves II through XII grossly intact. Normal speech, gait 

not observed.

















 Laboratory Results - last 24 hr











  01/26/18 01/26/18 01/26/18





  21:30 21:30 21:30


 


WBC  6.7  


 


RBC  5.05  


 


Hgb  15.4  


 


Hct  46.8  


 


MCV  92.7  


 


MCH  30.5  


 


MCHC  32.9  


 


RDW  11.9  


 


Plt Count  174  


 


MPV  7.9  


 


Neutrophils %  77.6  


 


Lymphocytes %  8.0  


 


Monocytes %  9.6  


 


Eosinophils %  1.5  


 


Basophils %  3.3 H  


 


Sodium   133 L 


 


Potassium   4.4 


 


Chloride   103 


 


Carbon Dioxide   27 


 


Anion Gap   3 L 


 


BUN   17 


 


Creatinine   1.3 


 


Creat Clearance w eGFR   53.12 


 


Random Glucose   113 H 


 


Lactic Acid    1.0


 


Calcium   9.1 


 


Phosphorus   


 


Magnesium   


 


Total Bilirubin   0.5 


 


AST   25 


 


ALT   18 


 


Alkaline Phosphatase   70 


 


Total Protein   6.5 


 


Albumin   3.9 


 


Urine Color   


 


Urine Appearance   


 


Urine pH   


 


Ur Specific Gravity   


 


Urine Protein   


 


Urine Glucose (UA)   


 


Urine Ketones   


 


Urine Blood   


 


Urine Nitrite   


 


Urine Bilirubin   


 


Urine Urobilinogen   


 


Ur Leukocyte Esterase   


 


Urine RBC   


 


Urine WBC   


 


Urine Bacteria   














  01/26/18 01/27/18 01/27/18





  23:26 07:38 07:38


 


WBC   4.0  D 


 


RBC   4.34 


 


Hgb   13.9 


 


Hct   40.4 


 


MCV   92.9 


 


MCH   32.1 


 


MCHC   34.5 


 


RDW   11.8 L 


 


Plt Count   137  D 


 


MPV   7.4 L 


 


Neutrophils %   74.3 


 


Lymphocytes %   9.5 


 


Monocytes %   14.2 H 


 


Eosinophils %   1.1 


 


Basophils %   0.9 


 


Sodium    133 L


 


Potassium    3.8


 


Chloride    100


 


Carbon Dioxide    27


 


Anion Gap    6 L


 


BUN    14


 


Creatinine    1.3


 


Creat Clearance w eGFR   


 


Random Glucose    102


 


Lactic Acid   


 


Calcium    8.5


 


Phosphorus    2.8


 


Magnesium    2.0


 


Total Bilirubin   


 


AST   


 


ALT   


 


Alkaline Phosphatase   


 


Total Protein   


 


Albumin   


 


Urine Color  Yellow  


 


Urine Appearance  Clear  


 


Urine pH  6.5  


 


Ur Specific Gravity  1.020  


 


Urine Protein  Trace  


 


Urine Glucose (UA)  Negative  


 


Urine Ketones  Negative  


 


Urine Blood  Trace-intact H  


 


Urine Nitrite  Negative  


 


Urine Bilirubin  Negative  


 


Urine Urobilinogen  0.2  


 


Ur Leukocyte Esterase  Negative  


 


Urine RBC  2-4  


 


Urine WBC  0-2  


 


Urine Bacteria  Few  








                        


 Current Medications











Generic Name Dose Route Start Last Admin





  Trade Name Freq  PRN Reason Stop Dose Admin


 


Amiodarone HCl  100 mg  01/27/18 10:00  01/27/18 10:00





  Cordarone -  PO   100 mg





  DAILY LARISA   Administration


 


Amlodipine Besylate  10 mg  01/27/18 10:00  01/27/18 10:00





  Norvasc -  PO   10 mg





  DAILY LARISA   Administration


 


Apixaban  5 mg  01/27/18 10:00  





  Eliquis -  PO   





  DAILY LARISA   


 


Cholecalciferol  1,000 unit  01/27/18 10:00  01/27/18 10:00





  Vitamin D3 -  PO   1,000 unit





  DAILY LARISA   Administration


 


Sodium Chloride  1,000 mls @ 100 mls/hr  01/27/18 15:39  





  Normal Saline -  IV   





  ASDIR LARISA   


 


Metoprolol Succinate  100 mg  01/27/18 10:00  01/27/18 10:00





  Toprol Xl -  PO   100 mg





  DAILY LARISA   Administration


 


Oseltamivir Phosphate  75 mg  01/27/18 15:00  01/27/18 15:00





  Tamiflu -  PO  02/01/18 14:59  75 mg





  BID LARISA   Administration


 


Piperacillin/Tazobactam/Dextrose  3.375 gm  01/27/18 18:00  





  Zosyn 3.375gm Ivpb (Premix)  IVPB   





  Q8H-IV LARISA   


 


Rosuvastatin Calcium  5 mg  01/27/18 22:00  





  Crestor -  PO   





  [facility]   














ASSESSMENT/PLAN


82 year-old male with a PMH significant for HTN, HLD, tissue aortic valve 

replacement on Eliquis, and prostate cancer. Admitted for fever, cough, and 

malaise likely secondary to influenza v. pneumonia.





Influenza


r/o pneumonia


   --clinical signs and symptoms: fever to 100.6, cough, hypoxic, and sudden 

onset of body aches and malaise 24 hours ago


   --flu swab negative but will treat clinical influenza with Tamiflu


   --CT chest negative for acute process


   --start empiric Vanc and Zosyn


   --ID consult requested


   --NS x 1L, then hourly





Hypertension


   --continue Toprol XL


   --hold amlodipine for borderline BP


   --hold home Lasix





Hyperlipidemia


   --continue Crestor





Tissue aortic valve replacement


   --continue Eliquis, amiodarone





Prostate cancer


   --no acute issues





FEN


   Fluids: NS @ 100mL/hr


   Electrolytes: replete as indicated


   Nutrition: low sodium





DVT prophylaxis: on Eliquis





Physical therapy





Dispo: continues to require inpatient care. Full code.














Visit type





- Emergency Visit


Emergency Visit: Yes


ED Registration Date: 01/26/18


Care time: The patient presented to the Emergency Department on the above date 

and was hospitalized for further evaluation of their emergent condition.





- New Patient


This patient is new to me today: Yes


Date on this admission: 01/27/18





- Critical Care


Critical Care patient: No

## 2018-01-28 LAB
ALBUMIN SERPL-MCNC: 3.2 G/DL (ref 3.5–5)
ALP SERPL-CCNC: 56 U/L (ref 32–92)
ALT SERPL-CCNC: 17 U/L (ref 10–40)
ANION GAP SERPL CALC-SCNC: 8 MMOL/L (ref 8–16)
AST SERPL-CCNC: 27 U/L (ref 10–42)
BASOPHILS # BLD: 0.6 % (ref 0–2)
BILIRUB SERPL-MCNC: 0.7 MG/DL (ref 0.2–1)
BUN SERPL-MCNC: 14 MG/DL (ref 7–18)
CALCIUM SERPL-MCNC: 8.6 MG/DL (ref 8.4–10.2)
CHLORIDE SERPL-SCNC: 101 MMOL/L (ref 98–107)
CO2 SERPL-SCNC: 26 MMOL/L (ref 22–28)
CREAT SERPL-MCNC: 1.4 MG/DL (ref 0.6–1.3)
DEPRECATED RDW RBC AUTO: 11.9 % (ref 11.9–15.9)
EOSINOPHIL # BLD: 0.5 % (ref 0–4.5)
GLUCOSE SERPL-MCNC: 153 MG/DL (ref 74–106)
HCT VFR BLD CALC: 45 % (ref 35.4–49)
HGB BLD-MCNC: 14.9 GM/DL (ref 11.7–16.9)
LYMPHOCYTES # BLD: 15.1 % (ref 8–40)
MAGNESIUM SERPL-MCNC: 2 MG/DL (ref 1.8–2.4)
MCH RBC QN AUTO: 30.9 PG (ref 25.7–33.7)
MCHC RBC AUTO-ENTMCNC: 33.2 G/DL (ref 32–35.9)
MCV RBC: 93.1 FL (ref 80–96)
MONOCYTES # BLD AUTO: 11.6 % (ref 3.8–10.2)
NEUTROPHILS # BLD: 72.2 % (ref 42.8–82.8)
PLATELET # BLD AUTO: 140 K/MM3 (ref 134–434)
PMV BLD: 8.6 FL (ref 7.5–11.1)
POTASSIUM SERPLBLD-SCNC: 3.6 MMOL/L (ref 3.5–5.1)
PROT SERPL-MCNC: 5.8 G/DL (ref 6.4–8.3)
RBC # BLD AUTO: 4.83 M/MM3 (ref 4–5.6)
SODIUM SERPL-SCNC: 135 MMOL/L (ref 136–145)
WBC # BLD AUTO: 3.7 K/MM3 (ref 4–10.8)

## 2018-01-28 RX ADMIN — IPRATROPIUM BROMIDE AND ALBUTEROL SULFATE SCH AMP: .5; 3 SOLUTION RESPIRATORY (INHALATION) at 21:20

## 2018-01-28 RX ADMIN — IPRATROPIUM BROMIDE AND ALBUTEROL SULFATE SCH AMP: .5; 3 SOLUTION RESPIRATORY (INHALATION) at 10:00

## 2018-01-28 RX ADMIN — OSELTAMIVIR PHOSPHATE SCH MG: 75 CAPSULE ORAL at 21:33

## 2018-01-28 RX ADMIN — IPRATROPIUM BROMIDE AND ALBUTEROL SULFATE SCH AMP: .5; 3 SOLUTION RESPIRATORY (INHALATION) at 14:10

## 2018-01-28 RX ADMIN — METOPROLOL SUCCINATE SCH MG: 100 TABLET, EXTENDED RELEASE ORAL at 10:05

## 2018-01-28 RX ADMIN — AMIODARONE HYDROCHLORIDE SCH MG: 200 TABLET ORAL at 09:00

## 2018-01-28 RX ADMIN — OSELTAMIVIR PHOSPHATE SCH MG: 75 CAPSULE ORAL at 11:58

## 2018-01-28 RX ADMIN — IPRATROPIUM BROMIDE AND ALBUTEROL SULFATE SCH AMP: .5; 3 SOLUTION RESPIRATORY (INHALATION) at 01:40

## 2018-01-28 RX ADMIN — CEFTRIAXONE SCH MLS/HR: 2 INJECTION, SOLUTION INTRAVENOUS at 12:06

## 2018-01-28 RX ADMIN — IPRATROPIUM BROMIDE AND ALBUTEROL SULFATE SCH AMP: .5; 3 SOLUTION RESPIRATORY (INHALATION) at 06:17

## 2018-01-28 RX ADMIN — TAZOBACTAM SODIUM AND PIPERACILLIN SODIUM SCH GM: 375; 3 INJECTION, SOLUTION INTRAVENOUS at 01:40

## 2018-01-28 RX ADMIN — SODIUM CHLORIDE SCH MLS/HR: 9 INJECTION, SOLUTION INTRAVENOUS at 16:11

## 2018-01-28 RX ADMIN — ROSUVASTATIN CALCIUM SCH MG: 5 TABLET, FILM COATED ORAL at 21:20

## 2018-01-28 RX ADMIN — VITAMIN D, TAB 1000IU (100/BT) SCH UNIT: 25 TAB at 10:05

## 2018-01-28 RX ADMIN — APIXABAN SCH MG: 5 TABLET, FILM COATED ORAL at 21:20

## 2018-01-28 NOTE — PN
Progress Note (short form)





- Note


Progress Note: 





ID Consult dictated


Probable acute influenza


Possible secondary bacterial pneumonitis


S/P AVR


Await sepsis work up


Continue empiric Tamiflu


Empiric ceftriaxone 2gm IVPB daily

## 2018-01-28 NOTE — PN
Physical Exam: 


SUBJECTIVE: Patient seen and examined sitting on edge of bed eating lunch. 

Feels much improved. Still with cough.








OBJECTIVE:





 Vital Signs











 Period  Temp  Pulse  Resp  BP Sys/Rosales  Pulse Ox


 


 Last 24 Hr  99.1 F-100.6 F  56-61  18-19  112-142/47-55  














GENERAL: The patient is awake, alert, and fully oriented, in no acute distress.


LUNGS: No wheezing appreciated; diminished breath sounds at the bases, mild 

bibasilar crackles


HEART: Regular rate and rhythm, S1, S2 


ABDOMEN: Soft, nontender, nondistended, normoactive bowel sounds, no guarding, 

no rebound


EXTREMITIES: 2+ pulses, warm, well-perfused, no edema. 


NEUROLOGICAL: Cranial nerves II through XII grossly intact. Normal speech, gait 

not observed.








                  


 Current Medications











Generic Name Dose Route Start Last Admin





  Trade Name Freq  PRN Reason Stop Dose Admin


 


Albuterol/Ipratropium  1 amp  01/27/18 22:00  01/28/18 14:10





  Duoneb -  NEB   1 amp





  Q4H LARISA   Administration


 


Amiodarone HCl  100 mg  01/27/18 10:00  01/28/18 09:00





  Cordarone -  PO   100 mg





  DAILY LARISA   Administration


 


Amlodipine Besylate  10 mg  01/27/18 10:00  01/27/18 10:00





  Norvasc -  PO   10 mg





  DAILY LARISA   Administration


 


Apixaban  5 mg  01/28/18 22:00  





  Eliquis -  PO   





  BID LARISA   


 


Cholecalciferol  1,000 unit  01/27/18 10:00  01/28/18 10:05





  Vitamin D3 -  PO   1,000 unit





  DAILY LARISA   Administration


 


Sodium Chloride  1,000 mls @ 100 mls/hr  01/27/18 15:39  01/28/18 16:11





  Normal Saline -  IV   100 mls/hr





  ASDIR LARISA   Administration


 


CEFTRIAXONE IN IS-OSM DEXTROSE  2 gm in 50 mls @ 100 mls/hr  01/28/18 11:00  01/ 28/18 12:06





  Ceftriaxone 2 Gm-D5w Bag  IVPB   100 mls/hr





  DAILY LARISA   Administration


 


Metoprolol Succinate  100 mg  01/27/18 10:00  01/28/18 10:05





  Toprol Xl -  PO   100 mg





  DAILY LARISA   Administration


 


Oseltamivir Phosphate  75 mg  01/27/18 15:00  01/28/18 11:58





  Tamiflu -  PO  02/01/18 14:59  75 mg





  BID LARISA   Administration


 


Rosuvastatin Calcium  5 mg  01/27/18 22:00  01/27/18 21:29





  Crestor -  PO   5 mg





  HS LARISA   Administration








ASSESSMENT/PLAN


82 year-old male with a PMH significant for HTN, HLD, tissue aortic valve 

replacement on Eliquis, and prostate cancer. Admitted for fever, cough, and 

malaise likely secondary to influenza v. pneumonia.





Influenza


r/o pneumonia


   --clinical signs and symptoms on admission consistent with influenza: fever 

to 100.6, cough, hypoxic, and sudden onset of body aches and malaise 


   --flu swab negative but will treat continue to treat for clinical influenza 

with Tamiflu


   --CT chest negative for acute process


   --per ID, continue ceftriaxone (day #2)


   


Hypertension


   --continue Toprol XL


   --BP has risen, resume amlodipine 


   --hold home Lasix





Hyperlipidemia


   --continue Crestor





Tissue aortic valve replacement


   --continue Eliquis, amiodarone





Prostate cancer


   --no acute issues





FEN


   Fluids: PO intake adequate


   Electrolytes: replete as indicated


   Nutrition: low sodium





DVT prophylaxis: on Eliquis





Physical therapy





Dispo: continues to require inpatient care. Full code.








Visit type





- Emergency Visit


Emergency Visit: Yes


ED Registration Date: 01/28/18


Care time: The patient presented to the Emergency Department on the above date 

and was hospitalized for further evaluation of their emergent condition.





- New Patient


This patient is new to me today: No





- Critical Care


Critical Care patient: No

## 2018-01-28 NOTE — CONS
DATE OF CONSULTATION:

 

DATE OF DICTATION:  01/28/2018

 

The patient is an 82-year-old male with a history of hypertension and aortic valve

replacement, evaluated for sepsis.  The patient was admitted to the hospital on

January 26, 2018, with several-day history of worsening fever.  The patient

complained of fever and chills at home associated with dizziness and generalized

weakness.  Family members had reported some altered mentation.  He was evaluated in

the emergency room, where he was noted to be ill appearing.  He stated that he became

dizzy when standing up or changing position.  He also complained of some vertigo. 

The patient had been seen in the emergency room approximately 10 days prior to

admission with a stomach issue, which has resolved. 

 

Influenza swab was performed in the emergency room and was negative.  Chest x-ray

showed possible right lower lobe infiltrate.  He was empirically treated with

vancomycin and Zosyn.  CAT scan of the chest was subsequently done and showed no

evidence of acute infiltrate.  At the present time, he is awake and alert, he reports

feeling better.  His mentation is improved.  He reports improvement in the dizziness.

 No complaints of high-grade fever, shaking chills, chest pain, shortness of breath,

cough, sputum production, vomiting or diarrhea.

 

He denies any ill contacts, lives at home with his wife, who is healthy, no recent

hospitalizations.

 

Past medical history positive for hypertension, hyperlipidemia, aortic valve

replacement, prostate cancer.

 

No known allergies.

 

MEDICATIONS:  Crestor, vitamin D, Toprol, Eliquis, amiodarone, Norvasc, Lasix,

Benicar.

 

SOCIAL HISTORY:  Lives at home with his wife.  No tobacco or alcohol use.

 

SYSTEMS REVIEW:

Neurologic:  No loss of consciousness, seizure activity, or focal weakness.

Cardiac:  Negative chest pain or palpitations.

Respiratory:  As per HPI.

Gastrointestinal:  Negative vomiting or diarrhea.

Genitourinary:  Negative for urinary tract infection.

 

LABORATORY DATA:  White count 4.0, hematocrit 40.4, platelet count 137.  BUN 14,

creatinine 1.3.  Urinalysis:  0 to 2 white cells.  CAT scan of the chest negative for

acute infiltrate.

 

PHYSICAL EXAMINATION:

General:  He is awake and alert.  He is not acutely toxic appearing.  His breathing

is nonlabored.

Vital Signs:  Temperature 99.5.  Blood pressure 123/47.  Pulse 58, regular. 

Respirations 18 per minute.  T-max 100.6.

ENT:  Sclerae anicteric.  Oropharynx negative.

Heart Sounds:  S1, S2.

Lungs:  Few rhonchi bilaterally.  No wheezing or rales.

Abdomen:  Soft.  No tenderness elicited.  No mass, rebound or rigidity.

Extremities:  1+ edema. 

 

IMPRESSION:

1.  Probable acute influenza versus viral respiratory tract infection.

2.  Possible secondary bacterial pneumonitis.

3.  Status post aortic valve replacement.

 

Await culture results.  Continue empiric Tamiflu.  Will also continue empiric

coverage for possible bacterial secondary infection with ceftriaxone 2 g IV piggyback

daily.  Will follow.

 

Thank you for the kind referral.

 

 

PARADISE CESPEDES M.D.

 

[id number]

DD: 01/28/2018 10:53

DT: 01/28/2018 12:17

Job #:  [id number]

## 2018-01-29 LAB
ALBUMIN SERPL-MCNC: 3.1 G/DL (ref 3.5–5)
ALP SERPL-CCNC: 52 U/L (ref 32–92)
ALT SERPL-CCNC: 16 U/L (ref 10–40)
ANION GAP SERPL CALC-SCNC: 8 MMOL/L (ref 8–16)
AST SERPL-CCNC: 22 U/L (ref 10–42)
BASOPHILS # BLD: 0.5 % (ref 0–2)
BILIRUB SERPL-MCNC: 0.5 MG/DL (ref 0.2–1)
BUN SERPL-MCNC: 13 MG/DL (ref 7–18)
CALCIUM SERPL-MCNC: 8.2 MG/DL (ref 8.4–10.2)
CHLORIDE SERPL-SCNC: 103 MMOL/L (ref 98–107)
CO2 SERPL-SCNC: 28 MMOL/L (ref 22–28)
CREAT SERPL-MCNC: 1.1 MG/DL (ref 0.6–1.3)
DEPRECATED RDW RBC AUTO: 12.1 % (ref 11.9–15.9)
EOSINOPHIL # BLD: 2.6 % (ref 0–4.5)
GLUCOSE SERPL-MCNC: 96 MG/DL (ref 74–106)
HCT VFR BLD CALC: 42.9 % (ref 35.4–49)
HGB BLD-MCNC: 14.4 GM/DL (ref 11.7–16.9)
LYMPHOCYTES # BLD: 18.3 % (ref 8–40)
MAGNESIUM SERPL-MCNC: 2 MG/DL (ref 1.8–2.4)
MCH RBC QN AUTO: 31.1 PG (ref 25.7–33.7)
MCHC RBC AUTO-ENTMCNC: 33.5 G/DL (ref 32–35.9)
MCV RBC: 92.9 FL (ref 80–96)
MONOCYTES # BLD AUTO: 13.9 % (ref 3.8–10.2)
NEUTROPHILS # BLD: 64.7 % (ref 42.8–82.8)
PLATELET # BLD AUTO: 128 K/MM3 (ref 134–434)
PMV BLD: 8.4 FL (ref 7.5–11.1)
POTASSIUM SERPLBLD-SCNC: 3.8 MMOL/L (ref 3.5–5.1)
PROT SERPL-MCNC: 5.6 G/DL (ref 6.4–8.3)
RBC # BLD AUTO: 4.62 M/MM3 (ref 4–5.6)
SODIUM SERPL-SCNC: 139 MMOL/L (ref 136–145)
WBC # BLD AUTO: 3.1 K/MM3 (ref 4–10.8)

## 2018-01-29 RX ADMIN — VITAMIN D, TAB 1000IU (100/BT) SCH UNIT: 25 TAB at 09:13

## 2018-01-29 RX ADMIN — AMIODARONE HYDROCHLORIDE SCH MG: 200 TABLET ORAL at 09:13

## 2018-01-29 RX ADMIN — IPRATROPIUM BROMIDE AND ALBUTEROL SULFATE SCH AMP: .5; 3 SOLUTION RESPIRATORY (INHALATION) at 14:50

## 2018-01-29 RX ADMIN — IPRATROPIUM BROMIDE AND ALBUTEROL SULFATE SCH AMP: .5; 3 SOLUTION RESPIRATORY (INHALATION) at 21:47

## 2018-01-29 RX ADMIN — VALSARTAN SCH MG: 160 TABLET, FILM COATED ORAL at 14:08

## 2018-01-29 RX ADMIN — AMLODIPINE BESYLATE SCH MG: 10 TABLET ORAL at 09:13

## 2018-01-29 RX ADMIN — OSELTAMIVIR PHOSPHATE SCH MG: 75 CAPSULE ORAL at 09:12

## 2018-01-29 RX ADMIN — APIXABAN SCH MG: 5 TABLET, FILM COATED ORAL at 09:13

## 2018-01-29 RX ADMIN — CEFTRIAXONE SCH MLS/HR: 2 INJECTION, SOLUTION INTRAVENOUS at 09:43

## 2018-01-29 RX ADMIN — METOPROLOL SUCCINATE SCH MG: 100 TABLET, EXTENDED RELEASE ORAL at 09:12

## 2018-01-29 RX ADMIN — ROSUVASTATIN CALCIUM SCH MG: 5 TABLET, FILM COATED ORAL at 21:47

## 2018-01-29 RX ADMIN — IPRATROPIUM BROMIDE AND ALBUTEROL SULFATE SCH AMP: .5; 3 SOLUTION RESPIRATORY (INHALATION) at 09:13

## 2018-01-29 RX ADMIN — OSELTAMIVIR PHOSPHATE SCH MG: 75 CAPSULE ORAL at 21:47

## 2018-01-29 RX ADMIN — APIXABAN SCH MG: 5 TABLET, FILM COATED ORAL at 21:47

## 2018-01-29 NOTE — PN
Progress Note (short form)





- Note


Progress Note: 





PULMONARY





CONSULTATION DICTATED 1/29/18





IMP VIRAL SYNDROME ? INFLUENZA


      DEHYDRATION


      HTN


      ASBESTOS PLEURAL DISEASE


      S/P AVR


      H/O PROSTATE CA





PLAN INHALED BRONCHODILATORS


        IV ANTIBIOTICS AS PER ID


        IVF


        CULTURES


        AC





        





DR HAGEN





Problem List





- Problems


(1) Viral syndrome


Code(s): B34.9 - VIRAL INFECTION, UNSPECIFIED   





(2) HLD (hyperlipidemia)


Code(s): E78.5 - HYPERLIPIDEMIA, UNSPECIFIED   





(3) HTN (hypertension)


Code(s): I10 - ESSENTIAL (PRIMARY) HYPERTENSION   





(4) S/P aortic valve replacement


Code(s): Z95.2 - PRESENCE OF PROSTHETIC HEART VALVE   





(5) Gastroenteritis


Code(s): K52.9 - NONINFECTIVE GASTROENTERITIS AND COLITIS, UNSPECIFIED   





(6) Dehydration


Code(s): E86.0 - DEHYDRATION

## 2018-01-29 NOTE — PN
Physical Exam: 


SUBJECTIVE: Patient seen and examined, sitting in bed, reports ongoing cough 





OBJECTIVE:82 year-old male with a PMH significant for HTN, HLD, tissue aortic 

valve replacement on Eliquis, and prostate cancer. Admitted for fever, cough, 

and malaise likely secondary to influenza v. pneumonia.





 Vital Signs











 Period  Temp  Pulse  Resp  BP Sys/Rosales  Pulse Ox


 


 Last 24 Hr  97.8 F-98.9 F  52-70  19-19  149-185/61-76  95-96











GENERAL: The patient is awake, alert, and fully oriented, in no acute distress.


HEAD: Normal with no signs of trauma.


EYES: PERRL, extraocular movements intact, sclera anicteric, conjunctiva clear. 

No ptosis. 


ENT: Ears normal, nares patent, oropharynx clear without exudates, moist mucous 


membranes.


NECK: Trachea midline, full range of motion, supple. 


LUNGS: Breath sounds equal, diminished at bases, course rhonchi to apexes, no 

wheezes, no crackles, no 


accessory muscle use. 


HEART: Regular rate and rhythm, S1, S2 without murmur, rub or gallop.


ABDOMEN: Soft, nontender, nondistended, normoactive bowel sounds, no guarding, 

no 


rebound, no hepatosplenomegaly, no masses.


EXTREMITIES: 2+ pulses, warm, well-perfused, no edema. 


NEUROLOGICAL: Cranial nerves II through XII grossly intact. Normal speech, gait 

not 


observed.


PSYCH: Normal mood, normal affect.


SKIN: Warm, dry, normal turgor, no rashes or lesions noted














 Laboratory Results - last 24 hr











  01/29/18 01/29/18





  07:00 07:00


 


WBC  3.1 L 


 


RBC  4.62 


 


Hgb  14.4 


 


Hct  42.9 


 


MCV  92.9 


 


MCH  31.1 


 


MCHC  33.5 


 


RDW  12.1 


 


Plt Count  128 L 


 


MPV  8.4 


 


Neutrophils %  64.7 


 


Lymphocytes %  18.3  D 


 


Monocytes %  13.9 H 


 


Eosinophils %  2.6  D 


 


Basophils %  0.5 


 


Sodium   139


 


Potassium   3.8


 


Chloride   103


 


Carbon Dioxide   28


 


Anion Gap   8


 


BUN   13


 


Creatinine   1.1  D


 


Creat Clearance w eGFR   > 60


 


Random Glucose   96  D


 


Calcium   8.2 L


 


Magnesium   2.0


 


Total Bilirubin   0.5  D


 


AST   22


 


ALT   16


 


Alkaline Phosphatase   52


 


Total Protein   5.6 L


 


Albumin   3.1 L








Active Medications











Generic Name Dose Route Start Last Admin





  Trade Name Freq  PRN Reason Stop Dose Admin


 


Albuterol/Ipratropium  1 amp  01/27/18 22:00  01/29/18 09:13





  Duoneb -  NEB   1 amp





  Q4H LARISA   Administration


 


Amiodarone HCl  100 mg  01/27/18 10:00  01/29/18 09:13





  Cordarone -  PO   100 mg





  DAILY LARISA   Administration


 


Amlodipine Besylate  10 mg  01/27/18 10:00  01/29/18 09:13





  Norvasc -  PO   10 mg





  DAILY LARISA   Administration


 


Apixaban  5 mg  01/28/18 22:00  01/29/18 09:13





  Eliquis -  PO   5 mg





  BID LARISA   Administration


 


Cholecalciferol  1,000 unit  01/27/18 10:00  01/29/18 09:13





  Vitamin D3 -  PO   1,000 unit





  DAILY LARISA   Administration


 


CEFTRIAXONE IN IS-OSM DEXTROSE  2 gm in 50 mls @ 100 mls/hr  01/28/18 11:00  01/ 29/18 09:43





  Ceftriaxone 2 Gm-D5w Bag  IVPB   100 mls/hr





  DAILY LARISA   Administration


 


Metoprolol Succinate  100 mg  01/27/18 10:00  01/29/18 09:12





  Toprol Xl -  PO   100 mg





  DAILY LARISA   Administration


 


Oseltamivir Phosphate  75 mg  01/27/18 15:00  01/29/18 09:12





  Tamiflu -  PO  02/01/18 14:59  75 mg





  BID LARISA   Administration


 


Rosuvastatin Calcium  5 mg  01/27/18 22:00  01/28/18 21:20





  Crestor -  PO   5 mg





  HS LARISA   Administration








 Microbiology





01/26/18 21:30   Blood - Peripheral Venous   Blood Culture - Preliminary


                            NO GROWTH OBTAINED AFTER 48 HOURS, INCUBATION TO 

CONTINUE


                            FOR 3 DAYS.


01/26/18 21:45   Blood - Peripheral Venous   Blood Culture - Preliminary


                            NO GROWTH OBTAINED AFTER 48 HOURS, INCUBATION TO 

CONTINUE


                            FOR 3 DAYS.


01/26/18 23:26   Urine - Urine Clean Catch   Urine Culture - Final


01/26/18 23:26   Urine For Antigen Detection   Legionella Antigen - Final


01/26/18 23:26   Urine For Antigen Detection   Streptococcus pneumoniae Antigen 

(M - Final,  negative


01/26/18 21:30   Nasopharyngeal Swab   Influenza Types A,B Antigen (CARLI) - Final

, negative 


01/26/18 21:30   Nasopharyngeal Swab    - Final








ASSESSMENT/PLAN:





1) ID


 Influenza


r/o pneumonia


- pt is afebrile, no leukocytosis, however, clinical signs are consistent with 

the flu upon admission, continue tamiflu


- -CT chest negative for acute process, -per ID, continue ceftriaxone (day #3)


- appreciate pulmonary input





2) cardiovascular


Hypertension


-  continue Toprol XL,and amlodipine, b/p above goal restart benicar


Hyperlipidemia


--continue Crestor


Tissue aortic valve replacement


- continue Eliquis, amiodarone





3) heme/onc


Prostate cancer


-no acute issues





FEN


   Fluids: PO intake adequate


   Electrolytes: replete as indicated


   Nutrition: low sodium





DVT prophylaxis: on Eliquis





Physical therapy





Dispo: continues to require inpatient care. Full code.





Visit type





- Emergency Visit


Emergency Visit: Yes


ED Registration Date: 01/28/18


Care time: The patient presented to the Emergency Department on the above date 

and was hospitalized for further evaluation of their emergent condition.





- New Patient


This patient is new to me today: Yes


Date on this admission: 01/29/18





- Critical Care


Critical Care patient: No





- Discharge Referral


Referred to [facility] No

## 2018-01-29 NOTE — CONS
DATE OF CONSULTATION:  01/29/2018

 

PULMONARY CONSULTATION 

 

REFERRING PHYSICIAN:  Martita Mejia N.P. 

 

HISTORY OF PRESENT ILLNESS:  The patient is an 82-year-old white male with a past

medical history hypertension, status post aortic valve replacement in 2008, history

of prostate CA status post surgery radiotherapy seed implants, history of smoking

many, many years ago, hypertension, hyperlipidemia, admitted to [facility] on January 26 secondary to generalized fever, chills, associated with

nausea, dizziness, diarrhea, nausea, and vomiting.  According to the family, the

patient noted he had an altered mentation.  Presented to the emergency room.  At the

time he was noted to be ill appearing.  He had a flu screen, which was negative. 

Apparently he became dizzy when changing position.  He was admitted with above . On

admission, he was started on antibiotics as well as Tamiflu for possible influenza. 

He had chest CT performed which revealed no evidence of acute infiltrate but revealed

evidence of bilateral pleural thickening, calcification consistent with asbestos

pleural disease.  Patient denies any history of COPD, asthma.  Denies any chest pain,

palpitations.  Denies any cough or hemoptysis.  He states that he had a cough

yesterday when using inhaled bronchodilator.  He states he is still employed as a

 and works daily.  He does complain of dyspnea on exertion walking up inclines

_____ level ground.  He denies orthopnea or PND.  

 

PAST MEDICAL HISTORY:  Includes hypertension, prostate CA, status post aortic valve

replacement.

 

SOCIAL HISTORY:  Currently  by profession.  History of tobacco greater than 50

years ago.  No recent travel.  

 

CURRENT MEDICATIONS:  Include Cordarone, Eliquis, DuoNeb, Toprol, ceftriaxone,

Norvasc, Crestor, Tamiflu, and vitamin D3.  

 

REVIEW OF SYSTEMS:  No orthopnea.  No PND.  Positive nausea.  Positive vomiting. 

Positive diarrhea.  No chest pain.  No palpitations.  

 

PHYSICAL EXAMINATION: 

General:  The patient is a well-developed, well-nourished male, awake, alert, in no

acute distress.  

Vital signs:  He is afebrile.  Blood pressure 168/76, respirations 19, and O2

saturation is 96% on room air.  

HEENT:  Head is normocephalic, atraumatic.  

Neck:  Supple.  

Heart:  Regular.  S1, S2.  

Chest:  Clear.

Abdomen:  Soft.  Bowel sounds positive.  

Extremities:  No cyanosis, edema. 

 

LABORATORY:  WBC is 3.1, hemoglobin is 14.4, hematocrit 42.9, platelet count of

128,000.  BUN 13, creatinine 1.1.  Chest CT as noted earlier, no infiltrates, no

effusions, bilateral pleural thickening and calcification consistent with previous

asbestos exposure.  

 

IMPRESSION:  

1.  Likely viral syndrome, possibly influenza.  Upper respiratory infection. 

2.  Status post aortic valve replacement.  

3.  Hypertension.

4.  Prostate carcinoma.

5.  Dehydration.

6.  Asbestos pleural disease.  

 

PLAN:  IV fluids.  Inhaled bronchodilators.  Antibiotics as per infectious disease. 

Follow up cultures.  

 

 

VIJAYA HAGEN M.D.

 

[id number]

DD: 01/29/2018 12:18

DT: 01/29/2018 19:17

Job #:  [id number]

## 2018-01-30 VITALS — SYSTOLIC BLOOD PRESSURE: 142 MMHG | DIASTOLIC BLOOD PRESSURE: 78 MMHG | HEART RATE: 58 BPM

## 2018-01-30 VITALS — TEMPERATURE: 98 F

## 2018-01-30 RX ADMIN — VITAMIN D, TAB 1000IU (100/BT) SCH UNIT: 25 TAB at 09:38

## 2018-01-30 RX ADMIN — AMIODARONE HYDROCHLORIDE SCH MG: 200 TABLET ORAL at 09:38

## 2018-01-30 RX ADMIN — AMLODIPINE BESYLATE SCH MG: 10 TABLET ORAL at 09:38

## 2018-01-30 RX ADMIN — APIXABAN SCH MG: 5 TABLET, FILM COATED ORAL at 09:38

## 2018-01-30 RX ADMIN — METOPROLOL SUCCINATE SCH MG: 100 TABLET, EXTENDED RELEASE ORAL at 09:38

## 2018-01-30 RX ADMIN — IPRATROPIUM BROMIDE AND ALBUTEROL SULFATE SCH AMP: .5; 3 SOLUTION RESPIRATORY (INHALATION) at 03:26

## 2018-01-30 RX ADMIN — VALSARTAN SCH MG: 160 TABLET, FILM COATED ORAL at 09:38

## 2018-01-30 RX ADMIN — IPRATROPIUM BROMIDE AND ALBUTEROL SULFATE SCH AMP: .5; 3 SOLUTION RESPIRATORY (INHALATION) at 05:28

## 2018-01-30 RX ADMIN — IPRATROPIUM BROMIDE AND ALBUTEROL SULFATE SCH AMP: .5; 3 SOLUTION RESPIRATORY (INHALATION) at 10:40

## 2018-01-30 RX ADMIN — OSELTAMIVIR PHOSPHATE SCH MG: 75 CAPSULE ORAL at 09:38

## 2018-01-30 NOTE — PN
Progress Note, Physician


History of Present Illness: 





Clinically improved


Awake, alert


No c/o chest pain/ dyspnea/ cough


No c/o fever/ chills


Afebrile WBC 3.1   13% M   plt 128


BC (-)    CT chest no infiltrate





- Current Medication List


Current Medications: 


Active Medications





Albuterol/Ipratropium (Duoneb -)  1 amp NEB Q4H [facility]


   Last Admin: 01/30/18 05:28 Dose:  1 amp


Amiodarone HCl (Cordarone -)  100 mg PO DAILY [facility]


   Last Admin: 01/30/18 09:38 Dose:  100 mg


Amlodipine Besylate (Norvasc -)  10 mg PO DAILY [facility]


   Last Admin: 01/30/18 09:38 Dose:  10 mg


Apixaban (Eliquis -)  5 mg PO BID [facility]


   Last Admin: 01/30/18 09:38 Dose:  5 mg


Cholecalciferol (Vitamin D3 -)  1,000 unit PO DAILY [facility]


   Last Admin: 01/30/18 09:38 Dose:  1,000 unit


Ceftriaxone Sodium (Rocephin 2gm Ivpb (Pre-Docked))  2 gm in 100 mls @ 200 mls/

hr IVPB DAILY [facility]


   Last Admin: 01/30/18 09:38 Dose:  200 mls/hr


Metoprolol Succinate (Toprol Xl -)  100 mg PO DAILY [facility]


   Last Admin: 01/30/18 09:38 Dose:  100 mg


Oseltamivir Phosphate (Tamiflu -)  75 mg PO BID [facility]


   Stop: 02/01/18 14:59


   Last Admin: 01/30/18 09:38 Dose:  75 mg


Rosuvastatin Calcium (Crestor -)  5 mg PO HS [facility]


   Last Admin: 01/29/18 21:47 Dose:  5 mg


Valsartan (Diovan -)  160 mg PO DAILY [facility]


   Last Admin: 01/30/18 09:38 Dose:  160 mg











- Objective


Vital Signs: 


 Vital Signs











Temperature  98.0 F   01/30/18 09:39


 


Pulse Rate  58 L  01/30/18 09:39


 


Respiratory Rate  20   01/30/18 09:39


 


Blood Pressure  142/78   01/30/18 09:39


 


O2 Sat by Pulse Oximetry (%)  97   01/30/18 06:00











Constitutional: Yes: No Distress


Eyes: Yes: Conjunctiva Clear


Cardiovascular: Yes: Regular Rate and Rhythm, S1, S2


Respiratory: Yes: Rhonchi, Other (few scatterred rhonchi)


Gastrointestinal: Yes: Normal Bowel Sounds, Soft, Abdomen, Obese.  No: 

Tenderness


Edema: No


Labs: 


 CBC, BMP





 01/29/18 07:00 





 01/29/18 07:00 











Assessment/Plan





Probable acute viral syndrome/ inflenza


Leukopenia/ monocytosis/ thrombocytopenia secondary to viral infection


Dehydration


S/P AVR


May switch to ceftin 500mg po bid x 3d


Complete 5d course of Tamiflu

## 2018-01-30 NOTE — DS
Physical Exam: 


SUBJECTIVE: Patient seen and examined








OBJECTIVE:


This is a 81 y/o man who presents to the ED with fever, chills, lethargy since 

today. Patient's son reports his dad is acting confused and not at his usual 

baseline. Patient denies numbness, tingling, facial droop, slurred speech. 

Patient denies cough, SOB, dizziness, HA, CP, AP, N/V/D, constipation, dysuria








 Vital Signs











 Period  Temp  Pulse  Resp  BP Sys/Rosales  Pulse Ox


 


 Last 24 Hr  97.9 F-98.3 F  50-61  18-20  139-161/66-92  94-97








PHYSICAL EXAM





GENERAL: The patient is awake, alert, and fully oriented, in no acute distress.


HEAD: Normal with no signs of trauma.


EYES: PERRL, extraocular movements intact, sclera anicteric, conjunctiva clear. 


ENT: Ears normal, nares patent, oropharynx clear without exudates, moist mucous 

membranes.


NECK: Trachea midline, full range of motion, supple. 


LUNGS: Breath sounds equal, clear to auscultation bilaterally, no wheezes, no 

crackles, no accessory muscle use. 


HEART: Regular rate and rhythm, S1, S2 without murmur, rub or gallop.


ABDOMEN: Soft, nontender, nondistended, normoactive bowel sounds, no guarding, 

no rebound, no hepatosplenomegaly, no masses.


EXTREMITIES: 2+ pulses, warm, well-perfused, no edema. 


NEUROLOGICAL: Cranial nerves II through XII grossly intact. Normal speech, gait 

not observed.


PSYCH: Normal mood, normal affect.


SKIN: Warm, dry, normal turgor, no rashes or lesions noted.





LABS


 CBC











WBC  3.1 K/mm3 (4.0-10.8)  L  01/29/18  07:00    


 


RBC  4.62 M/mm3 (4.00-5.60)   01/29/18  07:00    


 


Hgb  14.4 GM/dl (11.7-16.9)   01/29/18  07:00    


 


Hct  42.9 % (35.4-49)   01/29/18  07:00    


 


MCV  92.9 fl (80-96)   01/29/18  07:00    


 


MCH  31.1 pg (25.7-33.7)   01/29/18  07:00    


 


MCHC  33.5 g/dl (32.0-35.9)   01/29/18  07:00    


 


RDW  12.1 % (11.9-15.9)   01/29/18  07:00    


 


Plt Count  128 K/MM3 (134-434)  L  01/29/18  07:00    


 


MPV  8.4 fl (7.5-11.1)   01/29/18  07:00    


 


Neutrophils %  64.7 % (42.8-82.8)   01/29/18  07:00    


 


Lymphocytes %  18.3 % (8-40)  D 01/29/18  07:00    


 


Monocytes %  13.9 % (3.8-10.2)  H  01/29/18  07:00    


 


Eosinophils %  2.6 % (0-4.5)  D 01/29/18  07:00    


 


Basophils %  0.5 % (0-2.0)   01/29/18  07:00    








 CMP











Sodium  139 mmol/L (136-145)   01/29/18  07:00    


 


Potassium  3.8 mmol/L (3.5-5.1)   01/29/18  07:00    


 


Chloride  103 mmol/L ()   01/29/18  07:00    


 


Carbon Dioxide  28 mmol/L (22-28)   01/29/18  07:00    


 


Anion Gap  8  (8-16)   01/29/18  07:00    


 


BUN  13 mg/dl (7-18)   01/29/18  07:00    


 


Creatinine  1.1 mg/dl (0.6-1.3)  D 01/29/18  07:00    


 


Creat Clearance w eGFR  > 60  (>60)   01/29/18  07:00    


 


Random Glucose  96 mg/dl ()  D 01/29/18  07:00    


 


Lactic Acid  1.0 mmol/L (0.0-2.0)   01/26/18  21:30    


 


Calcium  8.2 mg/dl (8.4-10.2)  L  01/29/18  07:00    


 


Phosphorus  2.8 mg/dl (2.5-4.6)   01/27/18  07:38    


 


Magnesium  2.0 mg/dL (1.8-2.4)   01/29/18  07:00    


 


Total Bilirubin  0.5 mg/dl (0.2-1.0)  D 01/29/18  07:00    


 


AST  22 U/L (10-42)   01/29/18  07:00    


 


ALT  16 U/L (10-40)   01/29/18  07:00    


 


Alkaline Phosphatase  52 U/L (32-92)   01/29/18  07:00    


 


Total Protein  5.6 g/dl (6.4-8.3)  L  01/29/18  07:00    


 


Albumin  3.1 g/dl (3.5-5.0)  L  01/29/18  07:00    








 Microbiology





01/26/18 21:30   Blood - Peripheral Venous   Blood Culture - Preliminary


                            NO GROWTH OBTAINED AFTER 72 HOURS, INCUBATION TO 

CONTINUE


                            FOR 2 DAYS.


01/26/18 21:45   Blood - Peripheral Venous   Blood Culture - Preliminary


                            NO GROWTH OBTAINED AFTER 72 HOURS, INCUBATION TO 

CONTINUE


                            FOR 2 DAYS.


01/26/18 23:26   Urine - Urine Clean Catch   Urine Culture - Final


01/26/18 23:26   Urine For Antigen Detection   Legionella Antigen - Final, 

negative


01/26/18 23:26   Urine For Antigen Detection   Streptococcus pneumoniae Antigen 

(M - Final, negative 


01/26/18 21:30   Nasopharyngeal Swab   Influenza Types A,B Antigen (CARLI) - Final

, negative


01/26/18 21:30   Nasopharyngeal Swab    - Final, negative 








HOSPITAL COURSE:


1) ID


 Influenza


r/o pneumonia


- pt is afebrile, no leukocytosis, however, clinical signs are consistent with 

the flu upon admission, continue tamiflu


- -CT chest negative for acute process, -per ID, continue ceftriaxone (day #3)


- appreciate pulmonary input





2) cardiovascular


Hypertension


-  continue Toprol XL,and amlodipine, b/p above goal restart benicar


Hyperlipidemia


--continue Crestor


Tissue aortic valve replacement


- continue Eliquis, amiodarone





3) heme/onc


Prostate cancer


-no acute issues


Date of Admission:01/28/18





Date of Discharge: 01/30/18











Minutes to complete discharge: 45





Discharge Summary


Reason For Visit: PNEUMONIA


Current Active Problems





DVT prophylaxis (Acute)


Dehydration (Acute)


HLD (hyperlipidemia) (Acute)


HTN (hypertension) (Acute)


Pneumonia (Acute)


S/P aortic valve replacement (Acute)


Viral syndrome (Acute)








Condition: Stable





- Instructions


Diet, Activity, Other Instructions: 


continue tamiflu and ceftin as prescribed


continue all medications as prescribed


please follow up with your primary care physician Dr Cruz within 1 week


if any new or persistent symptoms develop please return to the emergency 

department 


Referrals: 


Androne,Christiana S [Primary Care Provider] - 


Disposition: HOME





- Home Medications


Comprehensive Discharge Medication List: 


Ambulatory Orders





RX: Rosuvastatin Calcium [Crestor] 5 mg PO DAILY #0 tablet 09/11/12 


RX: Cholecalciferol (Vitamin D3) [Vitamin D3] 1 tab PO DAILY 06/18/14 


RX: Metoprolol Succinate [Toprol XL -] 100 mg PO DAILY 06/18/14 


Apixaban [Eliquis] 5 mg PO DAILY 06/15/17 


Olmesartan Medoxomil [Benicar] 20 mg PO DAILY 01/16/18 


RX: Amiodarone HCl 100 mg PO DAILY 01/16/18 


RX: Amlodipine Besylate [Norvasc -] 10 mg PO DAILY 01/16/18 


RX: Furosemide 20 mg PO DAILY 01/16/18 











- Discharge Referral


Referred to [facility] No

## 2018-01-30 NOTE — PN
Progress Note, Physician


History of Present Illness: 





pulmonary





alert,nad,-sob,-cough





- Current Medication List


Current Medications: 


Active Medications





Albuterol/Ipratropium (Duoneb -)  1 amp NEB Q4H [facility]


   Last Admin: 01/30/18 05:28 Dose:  1 amp


Amiodarone HCl (Cordarone -)  100 mg PO DAILY [facility]


   Last Admin: 01/29/18 09:13 Dose:  100 mg


Amlodipine Besylate (Norvasc -)  10 mg PO DAILY [facility]


   Last Admin: 01/29/18 09:13 Dose:  10 mg


Apixaban (Eliquis -)  5 mg PO BID [facility]


   Last Admin: 01/29/18 21:47 Dose:  5 mg


Cholecalciferol (Vitamin D3 -)  1,000 unit PO DAILY [facility]


   Last Admin: 01/29/18 09:13 Dose:  1,000 unit


CEFTRIAXONE IN IS-OSM DEXTROSE (Ceftriaxone 2 Gm-D5w Bag)  2 gm in 50 mls @ 100 

mls/hr IVPB DAILY [facility]


   Last Admin: 01/29/18 09:43 Dose:  100 mls/hr


Metoprolol Succinate (Toprol Xl -)  100 mg PO DAILY [facility]


   Last Admin: 01/29/18 09:12 Dose:  100 mg


Oseltamivir Phosphate (Tamiflu -)  75 mg PO BID [facility]


   Stop: 02/01/18 14:59


   Last Admin: 01/29/18 21:47 Dose:  75 mg


Rosuvastatin Calcium (Crestor -)  5 mg PO HS [facility]


   Last Admin: 01/29/18 21:47 Dose:  5 mg


Valsartan (Diovan -)  160 mg PO DAILY [facility]


   Last Admin: 01/29/18 14:08 Dose:  160 mg











- Objective


Vital Signs: 


 Vital Signs











Temperature  98.0 F   01/30/18 06:00


 


Pulse Rate  61   01/30/18 06:00


 


Respiratory Rate  20   01/30/18 06:00


 


Blood Pressure  139/85   01/30/18 06:00


 


O2 Sat by Pulse Oximetry (%)  97   01/30/18 06:00











Constitutional: Yes: Well Nourished, Calm


Eyes: Yes: Occular Prosthesis, Other


Neck: Yes: WNL


Cardiovascular: Yes: Regular Rate and Rhythm, S1, S2


Respiratory: Yes: CTA Bilaterally


Gastrointestinal: Yes: Normal Bowel Sounds, Soft


Extremities: Yes: WNL


Edema: No


Labs: 


 CBC, BMP








Problem List





- Problems


(1) Viral syndrome


Code(s): B34.9 - VIRAL INFECTION, UNSPECIFIED   





(2) HLD (hyperlipidemia)


Code(s): E78.5 - HYPERLIPIDEMIA, UNSPECIFIED   





(3) HTN (hypertension)


Code(s): I10 - ESSENTIAL (PRIMARY) HYPERTENSION   





(4) S/P aortic valve replacement


Code(s): Z95.2 - PRESENCE OF PROSTHETIC HEART VALVE   





(5) Gastroenteritis


Code(s): K52.9 - NONINFECTIVE GASTROENTERITIS AND COLITIS, UNSPECIFIED   





(6) Dehydration


Code(s): E86.0 - DEHYDRATION   





Assessment/Plan





IMP VIRAL SYNDROME ? INFLUENZA


      DEHYDRATION improved


      HTN


      ASBESTOS PLEURAL DISEASE


      S/P AVR


      H/O PROSTATE CA





PLAN INHALED BRONCHODILATORS PRN


        ANTIBIOTICS AS PER ID


        AC





        





DR HAGEN





Problem List





- Problems


(1) Viral syndrome


Code(s): B34.9 - VIRAL INFECTION, UNSPECIFIED   





(2) HLD (hyperlipidemia)


Code(s): E78.5 - HYPERLIPIDEMIA, UNSPECIFIED   





(3) HTN (hypertension)


Code(s): I10 - ESSENTIAL (PRIMARY) HYPERTENSION   





(4) S/P aortic valve replacement


Code(s): Z95.2 - PRESENCE OF PROSTHETIC HEART VALVE   





(5) Gastroenteritis


Code(s): K52.9 - NONINFECTIVE GASTROENTERITIS AND COLITIS, UNSPECIFIED   





(6) Dehydration


Code(s): E86.0 - DEHYDRATION

## 2018-09-26 ENCOUNTER — HOSPITAL ENCOUNTER (OUTPATIENT)
Dept: HOSPITAL 74 - JASU-ENDO | Age: 83
Discharge: HOME | End: 2018-09-26
Attending: INTERNAL MEDICINE
Payer: COMMERCIAL

## 2018-09-26 VITALS — HEART RATE: 68 BPM | DIASTOLIC BLOOD PRESSURE: 64 MMHG | SYSTOLIC BLOOD PRESSURE: 165 MMHG

## 2018-09-26 VITALS — BODY MASS INDEX: 34 KG/M2

## 2018-09-26 VITALS — TEMPERATURE: 97.9 F

## 2018-09-26 DIAGNOSIS — Z80.0: ICD-10-CM

## 2018-09-26 DIAGNOSIS — Z86.010: ICD-10-CM

## 2018-09-26 DIAGNOSIS — K44.9: ICD-10-CM

## 2018-09-26 DIAGNOSIS — K29.80: ICD-10-CM

## 2018-09-26 DIAGNOSIS — K57.30: ICD-10-CM

## 2018-09-26 DIAGNOSIS — K21.0: ICD-10-CM

## 2018-09-26 DIAGNOSIS — Z51.11: Primary | ICD-10-CM

## 2018-09-26 PROCEDURE — 0DB68ZX EXCISION OF STOMACH, VIA NATURAL OR ARTIFICIAL OPENING ENDOSCOPIC, DIAGNOSTIC: ICD-10-PCS | Performed by: INTERNAL MEDICINE

## 2018-09-26 PROCEDURE — 0DJD8ZZ INSPECTION OF LOWER INTESTINAL TRACT, VIA NATURAL OR ARTIFICIAL OPENING ENDOSCOPIC: ICD-10-PCS | Performed by: INTERNAL MEDICINE

## 2018-09-26 PROCEDURE — 0DB48ZX EXCISION OF ESOPHAGOGASTRIC JUNCTION, VIA NATURAL OR ARTIFICIAL OPENING ENDOSCOPIC, DIAGNOSTIC: ICD-10-PCS | Performed by: INTERNAL MEDICINE

## 2018-09-26 PROCEDURE — 0DB98ZX EXCISION OF DUODENUM, VIA NATURAL OR ARTIFICIAL OPENING ENDOSCOPIC, DIAGNOSTIC: ICD-10-PCS | Performed by: INTERNAL MEDICINE

## 2018-09-26 PROCEDURE — 43239 EGD BIOPSY SINGLE/MULTIPLE: CPT

## 2018-09-27 NOTE — PATH
Surgical Pathology Report



Patient Name:  GHAZALA GILMORE

Accession #:  [id number]

Med. Rec. #:  [id number]                                                        

   /Age/Gender:  1935 (Age: 83) / M

Account:  [id number]                                                          

             Location: [facility]

Taken:  2018

Received:  2018

Reported:  2018

Physicians:  Dewayne Miramontes M.D.

  



Specimen(s) Received

A: BX 2ND PORTION DUODENUM AND DUODENAL BULB 

B: BX ANTRUM 

C: BX GE JUNCTION 





Clinical History

Change in bowel habits, adenoma surveillance, family history gastric cancer

Postoperative diagnosis: Hiatal hernia, GERD, duodenitis, diverticulosis







Final Diagnosis

A. DUODENUM, SECOND PORTION AND DUODENAL BULB, BIOPSY:

DUODENAL MUCOSA WITH MILD CHRONIC DUODENITIS AND MILD BRUNNER'S GLAND

HYPERPLASIA.



B. STOMACH, ANTRUM, BIOPSY:

GASTRIC ANTRAL MUCOSA WITH MILD TO MODERATE CHRONIC GASTRITIS.

IMMUNOHISTOCHEMICAL STAIN FOR H. PYLORI IS NEGATIVE.



C. GE JUNCTION, BIOPSY:

SQUAMOUS MUCOSA WITH MILD VASCULAR CONGESTION. NO COLUMNAR MUCOSA, INTESTINAL

METAPLASIA, OR DYSPLASIA IDENTIFIED.







***Electronically Signed***

Kelsy Wheeler M.D.





Gross Description

A.  Received in formalin, labeled "second portion duodenum and duodenal bulb"

are 3 tan, irregular portions of soft tissue measuring 0.2-0.3 cm. in greatest

dimension. The specimens are submitted in toto in one cassette.



B.  Received in formalin, labeled "antrum" are 4 tan, irregular portions of soft

tissue measuring 0.1-0.2 cm. in greatest dimension. The specimens are submitted

in toto in one cassette.



C.  Received in formalin, labeled "GE junction" are 2 tan, irregular portions of

soft tissue measuring 0.2 cm. in greatest dimension. The specimens are submitted

in toto in one cassette.

[email or web address]



[email or web address]

## 2019-10-31 ENCOUNTER — HOSPITAL ENCOUNTER (INPATIENT)
Dept: HOSPITAL 74 - FER | Age: 84
LOS: 14 days | Discharge: ANOTHER HOSPITAL | DRG: 871 | End: 2019-11-14
Attending: NURSE PRACTITIONER | Admitting: INTERNAL MEDICINE
Payer: COMMERCIAL

## 2019-10-31 VITALS — BODY MASS INDEX: 34.4 KG/M2

## 2019-10-31 DIAGNOSIS — M48.07: ICD-10-CM

## 2019-10-31 DIAGNOSIS — R59.1: ICD-10-CM

## 2019-10-31 DIAGNOSIS — J44.9: ICD-10-CM

## 2019-10-31 DIAGNOSIS — H93.13: ICD-10-CM

## 2019-10-31 DIAGNOSIS — Z95.2: ICD-10-CM

## 2019-10-31 DIAGNOSIS — R65.20: ICD-10-CM

## 2019-10-31 DIAGNOSIS — G47.33: ICD-10-CM

## 2019-10-31 DIAGNOSIS — C85.80: ICD-10-CM

## 2019-10-31 DIAGNOSIS — M54.12: ICD-10-CM

## 2019-10-31 DIAGNOSIS — G93.41: ICD-10-CM

## 2019-10-31 DIAGNOSIS — R74.8: ICD-10-CM

## 2019-10-31 DIAGNOSIS — K80.20: ICD-10-CM

## 2019-10-31 DIAGNOSIS — I48.21: ICD-10-CM

## 2019-10-31 DIAGNOSIS — I27.20: ICD-10-CM

## 2019-10-31 DIAGNOSIS — D12.6: ICD-10-CM

## 2019-10-31 DIAGNOSIS — B34.9: ICD-10-CM

## 2019-10-31 DIAGNOSIS — I10: ICD-10-CM

## 2019-10-31 DIAGNOSIS — E87.4: ICD-10-CM

## 2019-10-31 DIAGNOSIS — D72.819: ICD-10-CM

## 2019-10-31 DIAGNOSIS — Z85.46: ICD-10-CM

## 2019-10-31 DIAGNOSIS — G62.9: ICD-10-CM

## 2019-10-31 DIAGNOSIS — R23.3: ICD-10-CM

## 2019-10-31 DIAGNOSIS — R50.9: ICD-10-CM

## 2019-10-31 DIAGNOSIS — R09.02: ICD-10-CM

## 2019-10-31 DIAGNOSIS — I11.0: ICD-10-CM

## 2019-10-31 DIAGNOSIS — Z80.0: ICD-10-CM

## 2019-10-31 DIAGNOSIS — K57.90: ICD-10-CM

## 2019-10-31 DIAGNOSIS — M10.9: ICD-10-CM

## 2019-10-31 DIAGNOSIS — E78.5: ICD-10-CM

## 2019-10-31 DIAGNOSIS — Z87.891: ICD-10-CM

## 2019-10-31 DIAGNOSIS — D76.1: ICD-10-CM

## 2019-10-31 DIAGNOSIS — R41.82: ICD-10-CM

## 2019-10-31 DIAGNOSIS — I50.9: ICD-10-CM

## 2019-10-31 DIAGNOSIS — E87.6: ICD-10-CM

## 2019-10-31 DIAGNOSIS — A41.89: Primary | ICD-10-CM

## 2019-10-31 LAB
ALBUMIN SERPL-MCNC: 3.9 G/DL (ref 3.4–5)
ALP SERPL-CCNC: 76 U/L (ref 45–117)
ALT SERPL-CCNC: 16 U/L (ref 13–61)
ANION GAP SERPL CALC-SCNC: 10 MMOL/L (ref 8–16)
AST SERPL-CCNC: 24 U/L (ref 15–37)
BASOPHILS # BLD: 0.3 % (ref 0–2)
BILIRUB SERPL-MCNC: 1.3 MG/DL (ref 0.2–1)
BUN SERPL-MCNC: 19 MG/DL (ref 7–18)
CALCIUM SERPL-MCNC: 9 MG/DL (ref 8.5–10)
CHLORIDE SERPL-SCNC: 100 MMOL/L (ref 98–107)
CO2 SERPL-SCNC: 27 MMOL/L (ref 21–32)
CREAT SERPL-MCNC: 1.3 MG/DL (ref 0.55–1.3)
DEPRECATED RDW RBC AUTO: 12.5 % (ref 11.9–15.9)
EOSINOPHIL # BLD: 0.9 % (ref 0–4.5)
GLUCOSE SERPL-MCNC: 116 MG/DL (ref 74–106)
HCT VFR BLD CALC: 44.4 % (ref 35.4–49)
HGB BLD-MCNC: 14.4 GM/DL (ref 11.7–16.9)
LYMPHOCYTES # BLD: 8.8 % (ref 8–40)
MCH RBC QN AUTO: 29.5 PG (ref 25.7–33.7)
MCHC RBC AUTO-ENTMCNC: 32.5 G/DL (ref 32–35.9)
MCV RBC: 90.6 FL (ref 80–96)
MONOCYTES # BLD AUTO: 12.9 % (ref 3.8–10.2)
NEUTROPHILS # BLD: 77.1 % (ref 42.8–82.8)
PLATELET # BLD AUTO: 162 K/MM3 (ref 134–434)
PMV BLD: 7.6 FL (ref 7.5–11.1)
POTASSIUM SERPLBLD-SCNC: 4.1 MMOL/L (ref 3.5–5.1)
PROT SERPL-MCNC: 6.7 G/DL (ref 6.4–8.2)
RBC # BLD AUTO: 4.9 M/MM3 (ref 4–5.6)
SODIUM SERPL-SCNC: 137 MMOL/L (ref 136–145)
WBC # BLD AUTO: 4.6 K/MM3 (ref 4–10.8)

## 2019-10-31 PROCEDURE — A9537 TC99M MEBROFENIN: HCPCS

## 2019-10-31 PROCEDURE — A9579 GAD-BASE MR CONTRAST NOS,1ML: HCPCS

## 2019-10-31 NOTE — HP
Admitting History and Physical





- Primary Care Physician


PCP: Alma Baron





- Admission


Chief Complaint: Chills, Weakness


History of Present Illness: 





This is a 83 y/o man from home with a PMhx of HTN, HLD, s/p Prosthetic Aortic 

Valve (Bovine). Who presents to the ED with his family for ataxia, chills, 

rigors, and weakness. Patient reports feeling well earlier in the day. The 

patient's wife reports they were at the [employer]'s house earlier today, where 

the patient had difficulty getting out of the car and was walking like "he lost 

all his energy." the patient's wife reports she later found the patient under a 

blanket and was shaking. The patient reports he had a rough day yesterday" and 

feels "terrible." Denies any pain. The patient reports associated symptoms of 

shortness of breath. They called Dr. Samuel (cardiologist), who told them to 

follow up at the ER. Denies worsening shortness of breath, chest pain, 

abdominal pain, nausea, vomiting. Denies cough or congestion.


History Source: Patient


Limitations to Obtaining History: No Limitations





- Past Medical History


Cardiovascular: Yes: Aortic Insufficiency, HTN, Hyperlipdemia


Renal/: Yes: Cancer (prostate (seed implant))


Infectious Disease: Yes: MRSA (perianal abcess)





- Past Surgical History


Past Surgical History: Yes: Valve Replacement (aortic (on Eliquis))





- Smoking History


Smoking history: Former smoker


Have you smoked in the past 12 months: No


Aproximately how many cigarettes per day: 0


If you are a former smoker, when did you quit?: years ago





- Alcohol/Substance Use


Hx Alcohol Use: No


History of Substance Use: reports: None





- Social History


Usual Living Arrangement: Yes: With Spouse


ADL: Independent


History of Recent Travel: No





Home Medications





- Allergies


Allergies/Adverse Reactions: 


 Allergies











Allergy/AdvReac Type Severity Reaction Status Date / Time


 


No Known Allergies Allergy   Verified 10/31/19 18:44














- Home Medications


Home Medications: 


Ambulatory Orders





Rosuvastatin Calcium [Crestor] 5 mg PO DAILY #0 tablet 09/11/12 


Metoprolol Succinate [Toprol XL -] 100 mg PO DAILY 06/18/14 


Furosemide 20 mg PO DAILY 01/16/18 


Olmesartan Medoxomil [Benicar] 20 mg PO DAILY 01/16/18 


Apixaban [Eliquis] 5 mg PO BID 11/01/19 


Cyanocobalamin [Vitamin B12 -] 1,000 mcg PO DAILY 11/01/19 











Family Medical History


Family History: Unable to Obtain





Review of Systems





- Review of Systems


Constitutional: reports: Chills, Weakness


Eyes: reports: No Symptoms


HENT: reports: No Symptoms


Neck: reports: No Symptoms


Cardiovascular: reports: No Symptoms


Respiratory: reports: No Symptoms


Gastrointestinal: reports: No Symptoms


Genitourinary: reports: No Symptoms


Breasts: reports: No Symptoms Reported


Musculoskeletal: reports: Muscle Weakness


Integumentary: reports: No Symptoms


Neurological: reports: Unsteady Gait, Weakness


Endocrine: reports: No Symptoms


Hematology/Lymphatic: reports: No Symptoms


Psychiatric: reports: No Symptoms


Pain Intensity: 0





Physical Examination


Vital Signs: 


 Vital Signs











Temperature  100.1 F H  10/31/19 22:10


 


Pulse Rate  64   10/31/19 22:10


 


Respiratory Rate  16   10/31/19 22:10


 


Blood Pressure  180/88 H  10/31/19 22:10


 


O2 Sat by Pulse Oximetry (%)  95   10/31/19 22:10











Constitutional: Yes: Well Nourished, No Distress, Calm


Eyes: Yes: WNL, Conjunctiva Clear, EOM Intact, PERRL


HENT: Yes: WNL, Atraumatic, Normocephalic


Neck: Yes: WNL, Supple, Trachea Midline


Cardiovascular: Yes: Other (click)


Respiratory: Yes: WNL, Regular, CTA Bilaterally


Gastrointestinal: Yes: WNL, Normal Bowel Sounds, Soft, Abdomen, Obese


...Rectal Exam: Yes: WNL


Renal/: Yes: WNL


Breast(s): Yes: WNL


Musculoskeletal: Yes: Muscle Weakness


Extremities: Yes: WNL


Edema: No


Peripheral Pulses WNL: Yes


Neurological: Yes: Alert, Oriented.  No: Facial Droop, Numbness, Paresthesia


...Motor Strength: WNL


Psychiatric: Yes: WNL, Alert, Oriented


Labs: 


 CBC, BMP





 10/31/19 20:00 





 10/31/19 20:00 





 Laboratory Results - last 24 hr











  10/31/19 10/31/19 10/31/19





  20:00 20:00 20:00


 


WBC    4.6


 


RBC    4.90


 


Hgb    14.4


 


Hct    44.4


 


MCV    90.6


 


MCH    29.5


 


MCHC    32.5


 


RDW    12.5


 


Plt Count    162  D


 


MPV    7.6


 


Absolute Neuts (auto)    3.6


 


Neutrophils %    77.1


 


Lymphocytes %    8.8  D


 


Monocytes %    12.9 H


 


Eosinophils %    0.9


 


Basophils %    0.3


 


Sodium   137 


 


Potassium   4.1 


 


Chloride   100 


 


Carbon Dioxide   27 


 


Anion Gap   10 


 


BUN   19.0 H 


 


Creatinine   1.3 


 


Est GFR (CKD-EPI)AfAm   58.89 


 


Est GFR (CKD-EPI)NonAf   50.81 


 


Random Glucose   116 H 


 


Lactic Acid   


 


Calcium   9.0 


 


Phosphorus   


 


Magnesium   


 


Total Bilirubin   1.3 H 


 


AST   24 


 


ALT   16 


 


Alkaline Phosphatase   76 


 


Creatine Kinase   100 


 


Troponin I  0.04  


 


Total Protein   6.7 


 


Albumin   3.9 


 


Triglycerides   


 


Cholesterol   


 


Total LDL Cholesterol   


 


HDL Cholesterol   


 


Urine Color   


 


Urine Appearance   


 


Urine pH   


 


Urine Protein   


 


Urine Glucose (UA)   


 


Urine Ketones   


 


Urine Blood   


 


Urine Nitrite   


 


Urine Bilirubin   


 


Urine Urobilinogen   


 


Ur Leukocyte Esterase   


 


Urine RBC   


 


Urine WBC   


 


Urine Bacteria   














  10/31/19 10/31/19 11/01/19





  20:00 21:25 06:30


 


WBC   


 


RBC   


 


Hgb   


 


Hct   


 


MCV   


 


MCH   


 


MCHC   


 


RDW   


 


Plt Count   


 


MPV   


 


Absolute Neuts (auto)   


 


Neutrophils %   


 


Lymphocytes %   


 


Monocytes %   


 


Eosinophils %   


 


Basophils %   


 


Sodium    135 L


 


Potassium    3.5


 


Chloride    103


 


Carbon Dioxide    27


 


Anion Gap    5 L


 


BUN    18.0


 


Creatinine    1.3


 


Est GFR (CKD-EPI)AfAm    58.89


 


Est GFR (CKD-EPI)NonAf    50.81


 


Random Glucose    98


 


Lactic Acid  1.3  


 


Calcium    8.5


 


Phosphorus    3.0


 


Magnesium    2.0


 


Total Bilirubin   


 


AST   


 


ALT   


 


Alkaline Phosphatase   


 


Creatine Kinase   


 


Troponin I   


 


Total Protein   


 


Albumin   


 


Triglycerides   


 


Cholesterol   


 


Total LDL Cholesterol   


 


HDL Cholesterol   


 


Urine Color   Yellow 


 


Urine Appearance   Clear 


 


Urine pH   7.0 


 


Urine Protein   2+ H 


 


Urine Glucose (UA)   Negative 


 


Urine Ketones   Trace 


 


Urine Blood   2+ H 


 


Urine Nitrite   Negative 


 


Urine Bilirubin   Negative 


 


Urine Urobilinogen   1.0 


 


Ur Leukocyte Esterase   Negative 


 


Urine RBC   10-20 


 


Urine WBC   0-2 


 


Urine Bacteria   Few 














  11/01/19 11/01/19 11/01/19





  06:30 06:30 06:30


 


WBC  3.1 L  


 


RBC  4.28  


 


Hgb  12.7  


 


Hct  38.4  


 


MCV  89.7  


 


MCH  29.8  


 


MCHC  33.2  


 


RDW  12.5  


 


Plt Count  130 L  


 


MPV  7.7  


 


Absolute Neuts (auto)  2.3  


 


Neutrophils %  71.6  


 


Lymphocytes %  13.5  D  


 


Monocytes %  13.3 H  


 


Eosinophils %  1.0  


 


Basophils %  0.6  


 


Sodium   


 


Potassium   


 


Chloride   


 


Carbon Dioxide   


 


Anion Gap   


 


BUN   


 


Creatinine   


 


Est GFR (CKD-EPI)AfAm   


 


Est GFR (CKD-EPI)NonAf   


 


Random Glucose   


 


Lactic Acid   


 


Calcium   


 


Phosphorus   


 


Magnesium   


 


Total Bilirubin   


 


AST   


 


ALT   


 


Alkaline Phosphatase   


 


Creatine Kinase   


 


Troponin I    0.05


 


Total Protein   


 


Albumin   


 


Triglycerides   47 


 


Cholesterol   99 


 


Total LDL Cholesterol   55 


 


HDL Cholesterol   35 L 


 


Urine Color   


 


Urine Appearance   


 


Urine pH   


 


Urine Protein   


 


Urine Glucose (UA)   


 


Urine Ketones   





 


Urine Blood   


 


Urine Nitrite   


 


Urine Bilirubin   


 


Urine Urobilinogen   


 


Ur Leukocyte Esterase   


 


Urine RBC   


 


Urine WBC   


 


Urine Bacteria   














Imaging





- Results


Chest X-ray: Report Reviewed, Image Reviewed


Cat Scan: Report Reviewed, Image Reviewed


Ultrasound: Pending


EKG: Image Reviewed





Problem List





- Problems


(1) Weakness


Assessment/Plan: 


r/o CVA vs TIA vs Arrhythmia vs Sepsis


NIHSS 0


Head CT neg ICH, mass or lesion


Appreciate Neurology consult


Appreciate Cardiology consult


Cardiac monitoring


Neurochecks


Echo- check wall motion


Carotid Doppler r/o Stenosis


Swallow eval


[place] 


Pt Eval- Gait, conditioning


Fall Precautions


Code(s): R53.1 - WEAKNESS   





(2) Viral syndrome


Assessment/Plan: 


Blood cultures-pending


Urine culture-pending


T Max 100.1


No leukocytosis, no L- shift


Chest Xray- reviewed


Monitor CBC, BMP


Tylenol prn


NS bolus given, will avoid secondary to vascular congestion noted in CXR

















Code(s): B34.9 - VIRAL INFECTION, UNSPECIFIED   





(3) HLD (hyperlipidemia)


Assessment/Plan: 


Continue Crestor


Monitor LFTs





Problems reviewed: Yes   


Code(s): E78.5 - HYPERLIPIDEMIA, UNSPECIFIED   





(4) HTN (hypertension)


Assessment/Plan: 


stable


Monitor BP


Monitor renal function


Will need to have Day Team review meds with pt's wife, who has the list


Code(s): I10 - ESSENTIAL (PRIMARY) HYPERTENSION   





(5) S/P aortic valve replacement


Assessment/Plan: 


V


Code(s): Z95.2 - PRESENCE OF PROSTHETIC HEART VALVE   





(6) DIPTI (obstructive sleep apnea)


Assessment/Plan: 


BIPAP HS


Code(s): G47.33 - OBSTRUCTIVE SLEEP APNEA (ADULT) (PEDIATRIC)   





Assessment/Plan





This is a 83 y/o man with a PMHx of HTN, HLD, Prosthetic Aortic Valve ( Bovine)

. Admitted to Telemetry for CVA/TIA, Generalized Weakness, Viral Syndrome fir 

further evaluation of their emergent condition.





Plan:


See Problem List 





FEN


PO fluids


Replete lytes prn


Low Na Diet





DVT ppx


OOB


SCDs


Will need to verify AC (from home meds)





Dispo: Requires Inpatient Care

















Visit type





- Emergency Visit


Emergency Visit: Yes


ED Registration Date: 10/31/19


Care time: The patient presented to the Emergency Department on the above date 

and was hospitalized for further evaluation of their emergent condition.





- New Patient


This patient is new to me today: Yes


Date on this admission: 10/31/19





- Critical Care


Critical Care patient: No

## 2019-10-31 NOTE — PDOC
Documentation entered by Lima Acevedo SCRIBE, acting as scribe for 

Armani Bettencourt MD.








Armani Bettencourt MD:  This documentation has been prepared by the 

elysibe, Lima Acevedo SCRIBE, under my direction and personally reviewed by me 

in its entirety.  I confirm that the documentation accurately reflects all work

, treatment, procedures, and medical decision making performed by me.  





History of Present Illness





- General


Chief Complaint: Weakness


Stated Complaint: shakes and weakness


History Source: Patient


Exam Limitations: No Limitations





- History of Present Illness


Initial Comments: 


10/31/19 19:36


The patient is an 84-year-old male who presents to the emergency department 

with weakness and chills. Per the patient's wife at the bedside, the patient 

presents with a 1-day history of generalized weakness. The patient's wife 

reports they were at the grandkid's house earlier today, where the patient had 

difficulty getting out of the car and was walking like "he lost all his energy.

" the patient's wife reports she later found the patient under a blanket and 

was shaking. The patient reports he had a rough day yesterday" and feels 

"terrible." Denies any pain. The patient reports associated symptoms of 

shortness of breath. They called Dr. Samuel (cardiologist), who told them to 

follow up at the ER. Denies worsening shortness of breath, chest pain, 

abdominal pain, nausea, vomiting. Denies cough or congestion.





PAST MEDICAL HISTORY: HTN, HLD, and prosthetic aortic valve





PAST SURGICAL HISTORY:  no significant history





FAMILY HISTORY:  no pertinent history





SOCIAL HISTORY:  Pt lives with  family and is employed.





MEDICATIONS:  reviewed





ALLERGIES:  As per nursing notes





ROS: 


General:  +chills and weakness. No fever. no weight loss 


HEENT: No change in vision.  No sore throat,. No ear pain


CardioVascular:  No chest pain or shortness of breath


Respiratory: +shortness of breath. No cough, or wheezing. 


Gastrointestinal: no nausea, vomiting, diarrhea or constipation,  No rectal 

bleeding


Genitourinary:  No dysuria, hematuria, or frequency


Musculoskeletal: No joint or muscle pain or swelling


Neurologic: No headache, vertigo, dizziness or loss of consciousness


Psychiatric: nor depression 


Skin: No rashes or easy bruising


Endocrine: no increased thirst or abnormal weight change


Allergic: no skin or latex allergy


All other systems reviewed and normal





Physical exam: 


General: Well-nourished well-developed individual, no acute distress


HEENT: Throat: Normal, tonsils normal, no erythema or exudate


Neck: Supple, no meningeal signs, no lymphadenopathy


Eyes:Pupils equal reactive and round, extraocular motion intact


Chest: Nontender to palpation 


Cardiac: S1-S2 normal, regular rate and rhythm, no murmurs rubs or gallops


Respiratory: Lungs clear to auscultation bilateral


Abdomen: Soft, nondistended, normal bowel sounds, nontender to palpation 

diffusely


Extremities: Warm, dry, no cyanosis, clubbing, or edema


Skin: No rashes


Neuro: +patient sits leaning out to the left. Mental status: The patient alert 

and is oriented x3. Cranial nerves: Cranial nerves II through XII are intact


Fundoscopic exam normal. Motor: The upper extremities are 5 over 5 in all 

muscle groups. The lower extremities are 5 over 5 in all muscle groups. 

Sensation: Sensation is intact to light touch throughout. Cerebellar: Finger-

finger-nose is normal in both upper extremities. Heel-knee-shin is normal in 

both lower extremities. Gait: Normal. Heel and toe walking are normal. Tandem 

gait is normal.


Psych: Normal mood and affect





Assessment and plan:


This is an 84-year-old male brought in by his wife for evaluation of 

generalized weakness and shaking chills earlier.  Patient also was complaining 

of some shortness of breath.  Patient was noted also to have some difficulty 

sitting up straight and kept leaning to the left however no measurable weakness 

of his extremities.  Patient denied any vertigo symptoms.  Patient denied any 

chest pain nausea vomiting diarrhea. 





Work-up initiated including CBC, comp, blood cultures, urine cultures, UA, 

lactic acid, head CT, chest x-ray, EKG





Symptoms most likely are secondary to a urinary tract infection.


10/31/19 22:05


Patient's work-up was essentially unremarkable.  His CBC was normal, his CAT 

scan was no acute pathology.  His urine was negative for infection.  H his 

chest x-ray showed poor inspiration but essentially unchanged from prior his 

EKG was unchanged from prior





However he still is very unsteady on his feet which is off his baseline for him 

it is uncertain as to what the cause of it is however the concern is that it 

could be due to something cerebellar so patient will be admitted for weakness 

rule out CVA, rule out infection


10/31/19 22:46


Discussed with Dr. Arreguin neurology who recommends that patient be admitted 

for a rule out CVA work-up and no additional interventions at this time 

including nothing to lower blood pressure.





Patient is okay to stay at [facility]





Past History





- Past Medical History


Allergies/Adverse Reactions: 


 Allergies











Allergy/AdvReac Type Severity Reaction Status Date / Time


 


No Known Allergies Allergy   Verified 10/31/19 18:44











Home Medications: 


Ambulatory Orders





Rosuvastatin Calcium [Crestor] 5 mg PO DAILY #0 tablet 09/11/12 


Metoprolol Succinate [Toprol XL -] 100 mg PO DAILY 06/18/14 


Amiodarone HCl 100 mg PO DAILY 01/16/18 


Furosemide 20 mg PO DAILY 01/16/18 


Olmesartan Medoxomil [Benicar] 20 mg PO DAILY 01/16/18 


Cholecalciferol (Vitamin D3) [Vitamin D3] 1,000 unit PO DAILY 09/25/18 


Cyanocobalamin Vit B-12 Inj. [Vitamin B12 Injection -] 1,000 mcg IM MONTHLY 09/ 25/18 


Diphenhydramine HCl [Z-Sleep] 25 mg PO HS 09/25/18 


Hydralazine HCl 25 mg PO DAILY 09/25/18 


Mag Carb/Aluminum Hydrox/Algin [Gaviscon Liquid] 30 ml PO Q6H PRN #355 oz 09/26/ 18 


Pantoprazole Sodium 40 mg PO DAILY #1 tablet. 09/26/18 


Pantoprazole Sodium 40 mg PO DAILY #90 tablet. 09/26/18 


Polyethylene Glycol 3350 [Miralax (For Bowel Prep) -] 17 gm PO DAILY #1 bottle 

09/26/18 


Polyethylene Glycol 3350 [Miralax (For Bowel Prep) -] 17 gm PO DAILY 90 Days #1 

bottle 09/26/18 








Anemia: No


Asthma: No


Cancer: Yes (PROSTATE CA)


Cardiac Disorders: Yes (Aortic Valve Prothestic,Bovine)


CVA: No


COPD: No


CHF: No


DVT: No


Dementia: No


Diabetes: No


GI Disorders: No


 Disorders: No


HTN: Yes


Hypercholesterolemia: Yes


Liver Disease: No


Seizures: No


Thyroid Disease: No





- Surgical History


Abdominal Surgery: Yes (umbilical hernia)


Appendectomy: No


Cardiac Surgery: Yes (AORTIC VALVE REPLACEMENT)


Cholecystectomy: No


Lung Surgery: No


Neurologic Surgery: No


Orthopedic Surgery: No





- Immunization History


Immunization Up to Date: Yes





- Psycho Social/Smoking Cessation Hx


Smoking Status: No


Smoking History: Former smoker


Have you smoked in the past 12 months: No


Number of Cigarettes Smoked Daily: 0


If you are a former smoker, when did you quit?: years ago


Information on smoking cessation initiated: No


Hx Alcohol Use: No


Drug/Substance Use Hx: No


Substance Use Type: None


Hx Substance Use Treatment: No





*Physical Exam





- Vital Signs


 Last Vital Signs











Temp Pulse Resp BP Pulse Ox


 


 99.6 F   105 H  20   195/86 H  98 


 


 10/31/19 18:43  10/31/19 18:43  10/31/19 18:43  10/31/19 18:43  10/31/19 18:43














ED Treatment Course





- LABORATORY


CBC & Chemistry Diagram: 


 10/31/19 20:00





 10/31/19 20:00





- RADIOLOGY


Radiology Studies Ordered: 














 Category Date Time Status


 


 HEAD CT WITHOUT CONTRAST [CT] Stat CT Scan  10/31/19 19:38 Ordered


 


 CHEST X-RAY PORTABLE* [RAD] Stat Radiology  10/31/19 19:38 Ordered














Discharge





- Discharge Information


Problems reviewed: Yes


Clinical Impression/Diagnosis: 


 Weakness





Condition: Fair





- Admission


Yes





- Follow up/Referral





- Patient Discharge Instructions





- Post Discharge Activity

## 2019-11-01 LAB
ANION GAP SERPL CALC-SCNC: 5 MMOL/L (ref 8–16)
BASOPHILS # BLD: 0.6 % (ref 0–2)
BUN SERPL-MCNC: 18 MG/DL (ref 7–18)
CALCIUM SERPL-MCNC: 8.5 MG/DL (ref 8.5–10)
CHLORIDE SERPL-SCNC: 103 MMOL/L (ref 98–107)
CHOLEST SERPL-MCNC: 99 MG/DL (ref 50–200)
CO2 SERPL-SCNC: 27 MMOL/L (ref 21–32)
CREAT SERPL-MCNC: 1.3 MG/DL (ref 0.55–1.3)
DEPRECATED RDW RBC AUTO: 12.5 % (ref 11.9–15.9)
EOSINOPHIL # BLD: 1 % (ref 0–4.5)
GLUCOSE SERPL-MCNC: 98 MG/DL (ref 74–106)
HCT VFR BLD CALC: 38.4 % (ref 35.4–49)
HDLC SERPL-MCNC: 35 MG/DL (ref 40–60)
HGB BLD-MCNC: 12.7 GM/DL (ref 11.7–16.9)
LDLC SERPL CALC-MCNC: 55 MG/DL (ref 5–100)
LYMPHOCYTES # BLD: 13.5 % (ref 8–40)
MAGNESIUM SERPL-MCNC: 2 MG/DL (ref 1.8–2.4)
MCH RBC QN AUTO: 29.8 PG (ref 25.7–33.7)
MCHC RBC AUTO-ENTMCNC: 33.2 G/DL (ref 32–35.9)
MCV RBC: 89.7 FL (ref 80–96)
MONOCYTES # BLD AUTO: 13.3 % (ref 3.8–10.2)
NEUTROPHILS # BLD: 71.6 % (ref 42.8–82.8)
PHOSPHATE SERPL-MCNC: 3 MG/DL (ref 2.5–4.9)
PLATELET # BLD AUTO: 130 K/MM3 (ref 134–434)
PMV BLD: 7.7 FL (ref 7.5–11.1)
POTASSIUM SERPLBLD-SCNC: 3.5 MMOL/L (ref 3.5–5.1)
RBC # BLD AUTO: 4.28 M/MM3 (ref 4–5.6)
SODIUM SERPL-SCNC: 135 MMOL/L (ref 136–145)
TRIGL SERPL-MCNC: 47 MG/DL (ref 0–150)
WBC # BLD AUTO: 3.1 K/MM3 (ref 4–10.8)

## 2019-11-01 RX ADMIN — CEFEPIME HYDROCHLORIDE SCH: 1 INJECTION, SOLUTION INTRAVENOUS at 18:45

## 2019-11-01 RX ADMIN — ACETAMINOPHEN PRN MG: 325 TABLET ORAL at 22:56

## 2019-11-01 RX ADMIN — CYANOCOBALAMIN TAB 1000 MCG SCH MCG: 1000 TAB at 10:55

## 2019-11-01 RX ADMIN — APIXABAN SCH MG: 5 TABLET, FILM COATED ORAL at 21:45

## 2019-11-01 RX ADMIN — FUROSEMIDE SCH MG: 40 TABLET ORAL at 10:55

## 2019-11-01 RX ADMIN — ROSUVASTATIN CALCIUM SCH MG: 5 TABLET, FILM COATED ORAL at 22:52

## 2019-11-01 RX ADMIN — ACETAMINOPHEN PRN MG: 325 TABLET ORAL at 11:41

## 2019-11-01 RX ADMIN — ASPIRIN 81 MG SCH MG: 81 TABLET ORAL at 10:59

## 2019-11-01 RX ADMIN — FUROSEMIDE SCH: 40 TABLET ORAL at 11:48

## 2019-11-01 RX ADMIN — CEFEPIME HYDROCHLORIDE SCH MLS/HR: 1 INJECTION, POWDER, FOR SOLUTION INTRAMUSCULAR; INTRAVENOUS at 18:46

## 2019-11-01 RX ADMIN — ASPIRIN 81 MG SCH: 81 TABLET ORAL at 11:56

## 2019-11-01 RX ADMIN — CEFEPIME HYDROCHLORIDE SCH MLS/HR: 1 INJECTION, SOLUTION INTRAVENOUS at 14:42

## 2019-11-01 RX ADMIN — APIXABAN SCH MG: 5 TABLET, FILM COATED ORAL at 10:55

## 2019-11-01 RX ADMIN — ACETAMINOPHEN PRN MG: 325 TABLET ORAL at 16:09

## 2019-11-01 NOTE — CONSULT
Consultation: 


REQUESTING PROVIDER:





CONSULT REQUEST: ICU





HISTORY OF PRESENT ILLNESS:


84yM w PMHx HTN, HLD, DIPTI, a fib , prosthetic aortic valve replacement who 

presented to [facility] with progressive worsening fevers, rigors, back pain and 

weakness for 3d. Pt is disoriented and could not provide history. Pt 

transferred to [facility] for refractory fevers up to 103 despite being on cefepime. 

Concern for endocartitis. Pt is a  who works in wet tunnels.





REVIEW OF SYSTEMS:  could not elicit d/t pt confusion





PHYSICAL EXAMINATION





 Vital Signs - 24 hr











  10/31/19 10/31/19 11/01/19





  22:10 23:49 02:18


 


Temperature 100.1 F H  98.7 F


 


Pulse Rate   


 


Pulse Rate [ 64 72 62





Radial]   


 


Respiratory 16  18





Rate   


 


Blood Pressure   


 


Blood Pressure 180/88 H 161/80 163/86





[Arm]   


 


O2 Sat by Pulse 95 97 93 L





Oximetry (%)   














  11/01/19 11/01/19 11/01/19





  06:37 09:46 10:43


 


Temperature 99.3 F  


 


Pulse Rate   


 


Pulse Rate [ 51 L 65 





Radial]   


 


Respiratory 16 16 16





Rate   


 


Blood Pressure   


 


Blood Pressure 166/68 187/73 H 





[Arm]   


 


O2 Sat by Pulse 97 95 96





Oximetry (%)   














  11/01/19 11/01/19 11/01/19





  11:18 11:40 14:37


 


Temperature  100.1 F H 99.2 F


 


Pulse Rate   


 


Pulse Rate [ 86  50 L





Radial]   


 


Respiratory   19





Rate   


 


Blood Pressure   


 


Blood Pressure 187/93 H  144/81





[Arm]   


 


O2 Sat by Pulse 96  99





Oximetry (%)   














  11/01/19 11/01/19 11/01/19





  15:14 16:00 18:10


 


Temperature 99.2 F 102.0 F H 103.6 F H


 


Pulse Rate 50 L  58 L


 


Pulse Rate [  56 L 





Radial]   


 


Respiratory 19 22 H 18





Rate   


 


Blood Pressure 144/81  179/76 H


 


Blood Pressure  176/109 H 





[Arm]   


 


O2 Sat by Pulse  95 





Oximetry (%)   














  11/01/19





  18:51


 


Temperature 103.6 F H


 


Pulse Rate 58 L


 


Pulse Rate [ 





Radial] 


 


Respiratory 18





Rate 


 


Blood Pressure 179/76 H


 


Blood Pressure 





[Arm] 


 


O2 Sat by Pulse 96





Oximetry (%) 














GENERAL: Awake, alert, in no acute distress.


HEAD: Normal with no signs of trauma.


EYES: Pupils equal, round and reactive to light, conjunctiva clear.


LUNGS: Breath sounds equal, clear to auscultation bilaterally. No wheezes, and 

no crackles. No accessory muscle use.


HEART: Regular rate and rhythm, normal S1 and S2 . Systolic murmur


ABDOMEN: Soft, nontender, not distended, normoactive bowel sounds, no guarding, 

no rebound, no masses.  No hepatomegaly or splenomegaly. 


EXTREMITIES: warm, no peripheral edema or rash noted. 5s cap refill


NEUROLOGICAL:  AOx2. Confused. Normal speech. Follows simple commands











 Laboratory Results - last 24 hr











  10/31/19 10/31/19 10/31/19





  20:00 20:00 20:00


 


WBC    4.6


 


RBC    4.90


 


Hgb    14.4


 


Hct    44.4


 


MCV    90.6


 


MCH    29.5


 


MCHC    32.5


 


RDW    12.5


 


Plt Count    162  D


 


MPV    7.6


 


Absolute Neuts (auto)    3.6


 


Neutrophils %    77.1


 


Lymphocytes %    8.8  D


 


Monocytes %    12.9 H


 


Eosinophils %    0.9


 


Basophils %    0.3


 


Sodium   137 


 


Potassium   4.1 


 


Chloride   100 


 


Carbon Dioxide   27 


 


Anion Gap   10 


 


BUN   19.0 H 


 


Creatinine   1.3 


 


Est GFR (CKD-EPI)AfAm   58.89 


 


Est GFR (CKD-EPI)NonAf   50.81 


 


POC Glucometer   


 


Random Glucose   116 H 


 


Hemoglobin A1c %   


 


Lactic Acid   


 


Calcium   9.0 


 


Phosphorus   


 


Magnesium   


 


Total Bilirubin   1.3 H 


 


AST   24 


 


ALT   16 


 


Alkaline Phosphatase   76 


 


Creatine Kinase   100 


 


Troponin I  0.04  


 


B-Natriuretic Peptide   


 


Total Protein   6.7 


 


Albumin   3.9 


 


Triglycerides   


 


Cholesterol   


 


Total LDL Cholesterol   


 


HDL Cholesterol   


 


Urine Color   


 


Urine Appearance   


 


Urine pH   


 


Urine Protein   


 


Urine Glucose (UA)   


 


Urine Ketones   


 


Urine Blood   


 


Urine Nitrite   


 


Urine Bilirubin   


 


Urine Urobilinogen   


 


Ur Leukocyte Esterase   


 


Urine RBC   


 


Urine WBC   


 


Urine Bacteria   


 


Influenza A (Rapid)   


 


Influenza B (Rapid)   














  10/31/19 10/31/19 11/01/19





  20:00 21:25 06:30


 


WBC   


 


RBC   


 


Hgb   


 


Hct   


 


MCV   


 


MCH   


 


MCHC   


 


RDW   


 


Plt Count   


 


MPV   


 


Absolute Neuts (auto)   


 


Neutrophils %   


 


Lymphocytes %   


 


Monocytes %   


 


Eosinophils %   


 


Basophils %   


 


Sodium    135 L


 


Potassium    3.5


 


Chloride    103


 


Carbon Dioxide    27


 


Anion Gap    5 L


 


BUN    18.0


 


Creatinine    1.3


 


Est GFR (CKD-EPI)AfAm    58.89


 


Est GFR (CKD-EPI)NonAf    50.81


 


POC Glucometer   


 


Random Glucose    98


 


Hemoglobin A1c %   


 


Lactic Acid  1.3  


 


Calcium    8.5


 


Phosphorus    3.0


 


Magnesium    2.0


 


Total Bilirubin   


 


AST   


 


ALT   


 


Alkaline Phosphatase   


 


Creatine Kinase   


 


Troponin I   


 


B-Natriuretic Peptide   


 


Total Protein   


 


Albumin   


 


Triglycerides   


 


Cholesterol   


 


Total LDL Cholesterol   


 


HDL Cholesterol   


 


Urine Color   Yellow 


 


Urine Appearance   Clear 


 


Urine pH   7.0 


 


Urine Protein   2+ H 


 


Urine Glucose (UA)   Negative 


 


Urine Ketones   Trace 


 


Urine Blood   2+ H 


 


Urine Nitrite   Negative 


 


Urine Bilirubin   Negative 


 


Urine Urobilinogen   1.0 


 


Ur Leukocyte Esterase   Negative 


 


Urine RBC   10-20 


 


Urine WBC   0-2 


 


Urine Bacteria   Few 


 


Influenza A (Rapid)   


 


Influenza B (Rapid)   














  11/01/19 11/01/19 11/01/19





  06:30 06:30 06:30


 


WBC   3.1 L 


 


RBC   4.28 


 


Hgb   12.7 


 


Hct   38.4 


 


MCV   89.7 


 


MCH   29.8 


 


MCHC   33.2 


 


RDW   12.5 


 


Plt Count   130 L 


 


MPV   7.7 


 


Absolute Neuts (auto)   2.3 


 


Neutrophils %   71.6 


 


Lymphocytes %   13.5  D 


 


Monocytes %   13.3 H 


 


Eosinophils %   1.0 


 


Basophils %   0.6 


 


Sodium   


 


Potassium   


 


Chloride   


 


Carbon Dioxide   


 


Anion Gap   


 


BUN   


 


Creatinine   


 


Est GFR (CKD-EPI)AfAm   


 


Est GFR (CKD-EPI)NonAf   


 


POC Glucometer   


 


Random Glucose   


 


Hemoglobin A1c %  5.5  


 


Lactic Acid   


 


Calcium   


 


Phosphorus   


 


Magnesium   


 


Total Bilirubin   


 


AST   


 


ALT   


 


Alkaline Phosphatase   


 


Creatine Kinase   


 


Troponin I   


 


B-Natriuretic Peptide   


 


Total Protein   


 


Albumin   


 


Triglycerides    47


 


Cholesterol    99


 


Total LDL Cholesterol    55


 


HDL Cholesterol    35 L


 


Urine Color   


 


Urine Appearance   


 


Urine pH   


 


Urine Protein   


 


Urine Glucose (UA)   


 


Urine Ketones   


 


Urine Blood   


 


Urine Nitrite   


 


Urine Bilirubin   


 


Urine Urobilinogen   


 


Ur Leukocyte Esterase   


 


Urine RBC   


 


Urine WBC   


 


Urine Bacteria   


 


Influenza A (Rapid)   


 


Influenza B (Rapid)   














  11/01/19 11/01/19 11/01/19





  06:30 06:30 11:20


 


WBC   


 


RBC   


 


Hgb   


 


Hct   


 


MCV   


 


MCH   


 


MCHC   


 


RDW   


 


Plt Count   


 


MPV   


 


Absolute Neuts (auto)   


 


Neutrophils %   


 


Lymphocytes %   


 


Monocytes %   


 


Eosinophils %   


 


Basophils %   


 


Sodium   


 


Potassium   


 


Chloride   


 


Carbon Dioxide   


 


Anion Gap   


 


BUN   


 


Creatinine   


 


Est GFR (CKD-EPI)AfAm   


 


Est GFR (CKD-EPI)NonAf   


 


POC Glucometer   


 


Random Glucose   


 


Hemoglobin A1c %   


 


Lactic Acid   


 


Calcium   


 


Phosphorus   


 


Magnesium   


 


Total Bilirubin   


 


AST   


 


ALT   


 


Alkaline Phosphatase   


 


Creatine Kinase   


 


Troponin I  0.05  


 


B-Natriuretic Peptide   9668.7 H 


 


Total Protein   


 


Albumin   


 


Triglycerides   


 


Cholesterol   


 


Total LDL Cholesterol   


 


HDL Cholesterol   


 


Urine Color   


 


Urine Appearance   


 


Urine pH   


 


Urine Protein   


 


Urine Glucose (UA)   


 


Urine Ketones   


 


Urine Blood   


 


Urine Nitrite   


 


Urine Bilirubin   


 


Urine Urobilinogen   


 


Ur Leukocyte Esterase   


 


Urine RBC   


 


Urine WBC   


 


Urine Bacteria   


 


Influenza A (Rapid)    Negative


 


Influenza B (Rapid)    Negative














  11/01/19 11/01/19





  13:30 15:52


 


WBC  


 


RBC  


 


Hgb  


 


Hct  


 


MCV  


 


MCH  


 


MCHC  


 


RDW  


 


Plt Count  


 


MPV  


 


Absolute Neuts (auto)  


 


Neutrophils %  


 


Lymphocytes %  


 


Monocytes %  


 


Eosinophils %  


 


Basophils %  


 


Sodium  


 


Potassium  


 


Chloride  


 


Carbon Dioxide  


 


Anion Gap  


 


BUN  


 


Creatinine  


 


Est GFR (CKD-EPI)AfAm  


 


Est GFR (CKD-EPI)NonAf  


 


POC Glucometer   102


 


Random Glucose  


 


Hemoglobin A1c %  


 


Lactic Acid  


 


Calcium  


 


Phosphorus  


 


Magnesium  


 


Total Bilirubin  


 


AST  


 


ALT  


 


Alkaline Phosphatase  


 


Creatine Kinase  


 


Troponin I  0.05 


 


B-Natriuretic Peptide  


 


Total Protein  


 


Albumin  


 


Triglycerides  


 


Cholesterol  


 


Total LDL Cholesterol  


 


HDL Cholesterol  


 


Urine Color  


 


Urine Appearance  


 


Urine pH  


 


Urine Protein  


 


Urine Glucose (UA)  


 


Urine Ketones  


 


Urine Blood  


 


Urine Nitrite  


 


Urine Bilirubin  


 


Urine Urobilinogen  


 


Ur Leukocyte Esterase  


 


Urine RBC  


 


Urine WBC  


 


Urine Bacteria  


 


Influenza A (Rapid)  


 


Influenza B (Rapid)  








Active Medications











Generic Name Dose Route Start Last Admin





  Trade Name Freq  PRN Reason Stop Dose Admin


 


Acetaminophen  650 mg  11/01/19 02:18  11/01/19 16:09





  Tylenol -  PO   650 mg





  Q6H PRN   Administration





  FEVER   





     





     





     


 


Amlodipine Besylate  2.5 mg  11/02/19 10:00  





  Norvasc -  PO   





  DAILY LARISA   





     





     





     





     


 


Apixaban  5 mg  11/01/19 10:00  11/01/19 10:55





  Eliquis -  PO   5 mg





  BID LARISA   Administration





     





     





     





     


 


Cyanocobalamin  1,000 mcg  11/01/19 10:00  11/01/19 10:55





  Vitamin B12 -  PO   1,000 mcg





  DAILY LARISA   Administration





     





     





     





     


 


Furosemide  40 mg  11/02/19 06:00  





  Lasix Injection -  IVPUSH   





  BID@0600,1400 LARISA   





     





     





     





     


 


Cefepime HCl 1 gm/ Dextrose  100 mls @ 200 mls/hr  11/01/19 18:40  11/01/19 18:

46





  IVPB   200 mls/hr





  Q8H-IV LARISA   Administration





     





     





  Protocol   





     


 


Metoprolol Succinate  25 mg  11/01/19 10:00  11/01/19 10:55





  Toprol Xl -  PO   25 mg





  DAILY LARISA   Administration





     





     





     





     


 


Rosuvastatin Calcium  5 mg  11/01/19 22:00  





  Crestor -  PO   





  HS LARISA   





     





     





     





     











ASSESSMENT/PLAN:





Pulm - hx DIPTI


- on 2L NC


- CXR 10/31 shows luis m basilar pleural effusions, L>R


- continue home CPAP





Cards - concern for endocarditis, hx HTN, HLD, a fib


- Carotid duplex 11/1 showed no significant stenosis


- chest CT 11/1 showed mediastinal adenopathy, increased


- echo 11/1 showed EF 50-55%, LA severely dilated, mod-severe mitral annular 

calcification, elevated RVSP 30-40mmHg


- EKG 10/31 showed rate controlled a fib w RBBB


- continue home crestor, metoprolol, lasix, norvasc


- given 5 norvasc x1 for HTN


- DVT ppx


- appreciate cards recs





GI


- CT A/P 11/1 did not show any acute abdominal pathology


- continue vitamin B12


- Na diet








- no evidence of UTI


- placed maravilla 


- I/O





Endo


- normoglycemic, no active issues





Neuro


- CTH 10/31 did not show acute intacranial pathology


- no evidence of CVA per neuro


- appreciate neuro recs





ID - sepsis, febrile


- cefepime


- tylenol PRN for fever


- pending blood/urine cx, Legionella Ag


- pending lumbar/thoracic CT r/o abscess


- influenza neg


- appreciate ID recs





FEN


- no fluids


- hypoNa


- Na diet





PPX


- eliquis


- no GI ppx





Dispo - transfer to ICU for close monitoring





Visit type





- Emergency Visit


Emergency Visit: Yes


ED Registration Date: 11/01/19


Care time: The patient presented to the Emergency Department on the above date 

and was hospitalized for further evaluation of their emergent condition.





- New Patient


This patient is new to me today: Yes


Date on this admission: 11/02/19





- Critical Care


Critical Care patient: Yes


Total Critical Care Time (in minutes): 36


Critical Care Statement: The care of this patient involved high complexity 

decision making to prevent further life threatening deterioration of the patient

's condition and/or to evaluate & treat vital organ system(s) failure or risk 

of failure.





ATTENDING PHYSICIAN STATEMENT





I saw and evaluated the patient.


I reviewed the resident's note and discussed the case with the resident.


I agree with the resident's findings and plan as documented.








SUBJECTIVE:








OBJECTIVE:








ASSESSMENT AND PLAN:

## 2019-11-01 NOTE — CON.ID
Consult


Consult Specialty:: infectious diseases


Referred by:: hospitalist


Reason for Consultation:: sepsis,fever





- History of Present Illness


Chief Complaint: weakness,sepsis,sob


History of Present Illness: 





83 y/o man from home with a PMhx of HTN, HLD, s/p Prosthetic Aortic Valve . Who 

presents to the ED with his family for ataxia, chills, rigors, and weakness. 

Patient reports feeling well earlier in the day. The patient's wife reports 

they were at the grandkid's house earlier today, where the patient had 

difficulty getting out of the car and was walking like "he lost all his energy.

" the patient's wife reports she later found the patient under a blanket and 

was shaking. The patient reports he had a rough day yesterday" and feels 

"terrible." Denies any pain. The patient reports associated symptoms of 

shortness of breath. . Denies worsening shortness of breath, chest pain, 

abdominal pain, nausea, vomiting. Denies cough or congestion.


according to the family patient was crawling in a cold, damp, crammed tunnel 

under [employer] in [place] to close a valve and began to feel unwell.


Yesterday, while trick-or-treating with his grandchildren he became weak and 

had 30 mins of chills.  Still feels globally weak without focal complaints.





- History Source


History Provided By: Patient, Family Member


Limitations to Obtaining History: No Limitations





- Past Medical History


Cardio/Vascular: Yes: Aortic Insufficiency, HTN, Hyperlipdemia


Renal/: Yes: Cancer (prostate (seed implant))


Infectious Disease: Yes: MRSA (perianal abcess)





- Past Surgical History


Past Surgical History: Yes: Valve Replacement (aortic (on Eliquis))





- Alcohol/Substance Use


Hx Alcohol Use: No


History of Substance Use: reports: None





- Smoking History


Smoking history: Former smoker


Have you smoked in the past 12 months: No


Aproximately how many cigarettes per day: 0


If you are a former smoker, when did you quit?: years ago





- Social History


ADL: Independent


History of Recent Travel: No





Home Medications





- Allergies


Allergies/Adverse Reactions: 


 Allergies











Allergy/AdvReac Type Severity Reaction Status Date / Time


 


No Known Allergies Allergy   Verified 10/31/19 18:44














- Home Medications


Home Medications: 


Ambulatory Orders





Rosuvastatin Calcium [Crestor] 5 mg PO DAILY #0 tablet 09/11/12 


Metoprolol Succinate [Toprol XL -] 25 mg PO DAILY 06/18/14 


Furosemide 40 mg PO DAILY 01/16/18 


Olmesartan Medoxomil [Benicar] 20 mg PO DAILY 01/16/18 


Apixaban [Eliquis] 5 mg PO BID 11/01/19 


Cyanocobalamin [Vitamin B12 -] 1,000 mcg PO DAILY 11/01/19 











Review of Systems





- Review of Systems


Constitutional: reports: Chills, Fever, Weakness


Eyes: reports: No Symptoms


HENT: reports: No Symptoms


Neck: reports: No Symptoms


Cardiovascular: reports: No Symptoms


Respiratory: reports: SOB


Gastrointestinal: reports: No Symptoms


Genitourinary: reports: No Symptoms


Musculoskeletal: reports: No Symptoms


Integumentary: reports: No Symptoms


Neurological: reports: No Symptoms


Endocrine: reports: No Symptoms


Hematology/Lymphatic: reports: No Symptoms


Psychiatric: reports: No Symptoms





Physical Exam


Vital Signs: 


 Vital Signs











Temperature  100.1 F H  11/01/19 11:40


 


Pulse Rate  86   11/01/19 11:18


 


Respiratory Rate  16   11/01/19 10:43


 


Blood Pressure  187/93 H  11/01/19 11:18


 


O2 Sat by Pulse Oximetry (%)  96   11/01/19 11:18











Constitutional: Yes: Calm, Mild Distress


Eyes: Yes: Conjunctiva Clear


Neck: Yes: Supple, Trachea Midline


Cardiovascular: Yes: Pulse Irregular, S1, S2


Respiratory: Yes: Regular, CTA Bilaterally


Gastrointestinal: Yes: Normal Bowel Sounds, Soft


Musculoskeletal: Yes: Back Pain


Extremities: Yes: WNL


Neurological: Yes: Alert, Oriented


Psychiatric: Yes: Alert, Oriented


Labs: 


 CBC, BMP





 11/01/19 06:30 





 11/01/19 06:30 











Imaging





- Results


Chest X-ray: Report Reviewed, Image Reviewed


Cat Scan: Report Reviewed, Image Reviewed





Assessment/Plan





patient with multiple medical problems


coming in with fever and weakness and resp issues with high grade fever


all the imaging studies and labs noted


at the moment exact source cannot be determined


all the blood cx and other cx have been send


patient is septic looking and high fever


we will wait for all the cx to be back


also please get imaging studies for his back to see if he is harbouring any 

infection or focus in his back


close watch


monitor labs


support


ct scans noted


once we have all results will decide final plan


also patient has artifical valve


will also await for echo





Problem List





- Problems


(1) Severe sepsis


Code(s): A41.9 - SEPSIS, UNSPECIFIED ORGANISM; R65.20 - SEVERE SEPSIS WITHOUT 

SEPTIC SHOCK   





(2) DIPTI (obstructive sleep apnea)


Code(s): G47.33 - OBSTRUCTIVE SLEEP APNEA (ADULT) (PEDIATRIC)   





(3) HLD (hyperlipidemia)


Code(s): E78.5 - HYPERLIPIDEMIA, UNSPECIFIED   





(4) HTN (hypertension)


Code(s): I10 - ESSENTIAL (PRIMARY) HYPERTENSION   





(5) S/P aortic valve replacement 


Code(s): Z95.2 - PRESENCE OF PROSTHETIC HEART VALVE   





(6) Afib


Code(s): I48.91 - UNSPECIFIED ATRIAL FIBRILLATION   








cc 45 min

## 2019-11-01 NOTE — CONS
DATE OF CONSULTATION:  

 

DATE OF DICTATION:  2019

 

REQUESTING PARTY:  Hospitalist Service.  

 

CHIEF COMPLAINT:

1.  Chills and rigors.  

2.  Weakness.  

3.  Fever.

4.  History of progressive cough.  

 

HISTORY OF PRESENT ILLNESS:  The patient is an 84-year-old white gentleman with

history of hypertension, hypertensive cardiovascular disease, aortic valvular disease

status post bioprosthetic aortic valve replacement, history of left ventricular

diastolic dysfunction, congestive heart failure, history of paroxysmal atrial

fibrillation, history of chronic obstructive pulmonary disease.  

 

Patient is a  and was working in a very confined space trying to fix a pipe

and got stuck between the pipes and he was able to extricate himself but developed

severe back pain and also became completely soaked with water.  He came home and wife

noticed that he was in significant pain, looked pale, was very tired and complained

of weakness, and she noticed that he was coughing.  The next morning he woke up and

started developing chills and rigors which progressively became more pronounced.  He

had no appetite.  The back pain became more pronounced, and the wife noticed that his

cough became progressively worse.  There is no history of expectoration.  He did

notice that he was warm and had periods of mild disorientation.  Patient continued to

have recurring rigors and according to his son, he required several blankets to try

and keep him warm.  His son finally called the office and said his father was in

significant pain and was advised to take him to the nearest emergency room.  He was

admitted at [facility] and continued to have intermittent rigors and had

intermittent back pain which was severe.  While he was in the emergency room, he was

found to be tachypneic and was wheezing and was in left ventricular failure, treated

with intravenous diuretic.  

 

On questioning, he denies having chest pain or discomfort.  There is no history of

paroxysmal nocturnal dyspnea or orthopnea.  Has chronic history of exertional

dyspnea.  There is no history of palpitations, lightheadedness, dizziness,

presyncope, or syncope.  

 

In view of progressive deterioration, it was decided that he was to be transferred to

[facility].  Since admission at [facility], he has had periods of mild

disorientation, still continues to spike a temperature and periodically complains of

severe back pain.  

 

PAST MEDICAL HISTORY:  Past history as mentioned in the history of present illness.  

 

History of carcinoma of the prostate.  

 

History of MRSA.  

 

SURGICAL HISTORY:  Status post TURP.  

 

Status post aortic valve replacement for severe aortic regurgitation.  

 

SOCIAL HISTORY:   by profession.  .  Has 3 sons who are healthy. 

Currently no history of smoking.  He does drink on a regular basis except that he has

not had a drink over the past 2 months.  Has a cup of coffee.

FAMILY HISTORY:  Father  at the age of 64, apparently related to pneumonia. 

Mother  in her 40s of carcinoma.  Two brothers, both of them  in their 90s of

natural causes.  

 

ALLERGIES:  None reported.

 

MEDICATION:  

1.  Tylenol 650 mg p.o. q.6 h. p.r.n.

2.  Cefepime 200 mL per hour IV q.8 h.

3.  Eliquis 5 mg p.o. b.i.d.

4.  Metoprolol succinate 25 mg p.o. daily.

5.  Amlodipine 2.5 mg p.o. daily.

6.  Rosuvastatin 5 mg p.o. daily.

7.  Furosemide 40 mg IV daily.  

8.  Vitamin B12, 1000 mcg p.o. daily.  

 

REVIEW OF SYSTEMS: 

Constitutional:  History of chills and rigors.  No fever.  No history of

unintentional weight loss.  

HEENT:  No history of headaches, diplopia, blurred vision reported.  No history of

epistaxis, hoarseness.  History of bilateral deafness requiring hearing aids.  

Cardiovascular:  See history of present illness.  

Respiratory:  History of chronic pulmonary disease, history of exposure to asbestos. 

History of cough.  No history of expectoration.  No history of hemoptysis.  No

history of tuberculosis.  

Gastrointestinal:  History of poor appetite for the past few days.  No history of

nausea, vomiting, melena, or hematemesis.  No history of abdominal pain or

discomfort.  No history of change in bowel habits.  

Endocrine:  No history of polyuria and polydipsia.  History of intolerance to cold or

warm weather.  

Genitourinary:  No history of polyuria or polydipsia.  History of nocturia.  History

of urgency and frequency while taking diuretics.  No history of hematuria.  

Musculoskeletal:  No history of arthralgias or myalgias reported.  

Hematological/Lymphatics:  No history of ecchymosis, anemia, or bleeding.  No history

of lymphadenopathy.  

 

PHYSICAL EXAMINATION:

General:  An 84-year-old gentleman who was in moderate distress, he was flushed. 

There is no pallor or cyanosis noted.  No clubbing or jaundice.  

Vital signs:  Blood pressure at 1600 hours was 170/109 mmHg.  Currently, he is 179/76

mmHg.  Pulse is 58 beats per minute and regular.  Respirations were 18 per minute. 

Temperature at 1851 hours was 103.6 degrees Fahrenheit rectally.  Oxygen saturation

on 2 L was 96% at 1851 hours.  

Neck:  Supple.  No jugulovenous distention, hepatojugular reflex was negative,

carotids were 2+, upstrokes were normal, no bruits or thyromegaly was appreciated.

Heart:  PMI was in the 5th intercostal space, no heaves or thrills, S1 and S2 were

normal, ejection systolic murmur grade 2/6 was heard at the 2nd right intercostal

space with early systolic peaking.  No diastolic murmur or gallops were heard.  

Lungs:  Coarse breath sounds at the right base.  No expiratory wheezing or other

extraneous sounds were heard.  

Chest:  Normal AP diameter.  Expansion appeared symmetrical.

Abdomen:  Soft, protuberant, nontender, no hepatosplenomegaly or palpable masses were

felt.  No bruits were appreciated, and bowel sounds were present.

Extremities:  No calf tenderness or dependent edema, femoral pulses were 2+, dorsalis

pedis pulses were 1+.  Posterior tibial pulses could not be palpated.  

 

LABORATORY DATA:  X-ray chest, single AP view of the chest has been submitted.  Since

2019, there is slightly weak inspiration with large heart, unfolded

aorta, sternal sutures and some mild congestive changes.  Angles are sharp.  The bone

and soft tissues are intact.  

 

ECG reported as atrial fibrillation with slow ventricular response.  Right bundle

branch block.  Cannot rule out inferior infarct age indeterminate. Abnormal ECG. 

When compared to ECG of 2018, atrial fibrillation has replaced sinus

rhythm.  

 

Echocardiogram 2019.  Interpretation summary:  There is moderate

concentric left ventricular hypertrophy.  Left ventricular systolic function is

normal.  Ejection fraction is 50% to 55%.  Right ventricular systolic function is

grossly normal.  Left atrium is severely dilated.  There is moderate to severe mitral

annular calcification.  There is mild tricuspid regurgitation.  Right ventricular

systolic pressure is elevated at 30 to 40 mmHg.  Moderate valvular aortic stenosis. 

There is no pericardial effusion.  

 

Chest CT without contrast, impression:  No evidence of infiltrate or consolidation

seen.  Mediastinal adenopathy which shows moderate increase since prior exam. 

Additional evaluation and followup suggested.  Pleural thickening and calcification

as previously.  Cholelithiasis.  Clinical correlation advised.  

 

Carotid Doppler.  Impression:  Intimal thickening and small plaque in the distal

right common carotid artery and bifurcation/bulb as well as intimal thickening and

small to moderate sized plaque in the distal left common carotid artery and at the

bifurcation/bulb without evidence of hemodynamically significant stenosis,

bilaterally.  Intimal thickening and small to moderate sized plaque are present in

the mid left common carotid artery.  

 

CT of the head, impression:  No CT evidence of acute intracranial pathology. 

Moderate periventricular and subcortical chronic microvascular ischemic changes are

noted.  

 

LABORATORY DATA:  CBC:  2019:  WBC count 3100.  Hemoglobin 12.7 g/dL. 

Platelet count 130,000.  

 

Normal cell indices.  

 

Neutrophils 71.6%, lymphocytes 13.5%, monocytes 13.3%, eosinophils 1%, basophils

0.3%.  

 

BNP 9608.7 pg/mL. 

 

Lipid panel:  Total cholesterol 99, triglycerides 47, total LDL 55, HDL 35 mg/dL.  

 

Sodium 135, potassium 3.5, chloride 103, CO2 of 27 mmol/L.  Calcium 8.5 mg/dL.  Her

troponin I, 0.05 and 0.05.  

 

Urinalysis revealed 2+ protein, 2+ blood.  Urine, leukocyte esterase were negative,

10 to 20 RBCs, 0 to 2 WBCs, few bacteria.  

 

_____ influenza A and B were negative.  

 

IMPRESSION:

1.    Chills, rigors, fever, etiology:

A.    Related to pneumonia.  

B.    Sepsis.

C.    Endocarditis of the prosthetic valve needs to be excluded.  

D.    Urinary tract infection.  

2.    Hypertension.  Hypertensive cardiovascular disease, poorly controlled.  

3.    Aortic valvular disease status post bioprosthetic aortic valve replacement. 

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  

4.    In atrial fibrillation with slow ventricular response.  

5.    Left ventricular diastolic dysfunction.  

6.    Recent acute left ventricular failure.  

7.    Hypercholesterolemia.  

8.    Back pain most likely related to injury, possibility of back pain associated

with endocarditis needs exclusion. 

9.    Mediastinal lymphadenopathy, etiology to be determined.  

10.   Intermittent mental confusion is most likely related to metabolic and/or

toxic encephalopathy.

11.   Obstructive sleep apnea syndrome on CPAP.  

12.   History of chronic pulmonary disease.  

13.   Right bundle branch block. 

 

RECOMMENDATION:  

1.    Control of blood pressure.  

2.    Resume all outpatient antihypertensive therapy and request family to bring

all the medications for reconciliation.

3.    Follow up CBC.

4.    Further suggestions as necessary.

 

Prognosis critical.

Thank you for your referral.  

 

In attendance for 2 hours and 10 minutes.  

 

Yours sincerely,

 

 

EVA PEREZ M.D.

 

DANILO/[id number]

DD: 2019 20:54

DT: 2019 22:29

Job #:  [id number]

## 2019-11-01 NOTE — CONSULT
Consult - text type





- Consultation


Consultation Note: 








NEUROLOGY CONSULTATION is greatly appreciated:





Events reviewed and discussed with ED MD. Patient examined.


This84 yo RH man works full time as a .


PMH sig for HTN, Chol, ASHD s/p Bovine AVR.


Maintained on: Rosuvastatin;  Metoprolol 100 mg; Furosemide 20; Olmesartan; 

Apixaban 5 mg PO BID; and Cyanocobalamin.


Wednesday was crawling in a cold, damp, cramoed tunnel under [employer] to 

close a valve and began to feel unwell.


Yesterday, while trick-or-treating with his grandchildren he becam weak and had 

30 mins of chills. Came to ER. Still feels globally weak without focal 

complaints.





CT of head (reviewed): Normal with age related changes.


Carotid duplex: No significant stenosis.


UA sig for Hematuria.





EXAM: /90. Temp as high as 100.1. Started on Cefapime


NEURO: Awake, alert. MS/speech: Normal


           CN II-XII: normal


           Motor: No drift or tremor. Normal strength, tone aqnd bulk. Normal 

reflexes except absent AJ's. Toes downgoing


           Coord: No  FTN dystaxia


           Sensory: Decreased vibration feet.


           Gait: Sl shuffle





IMP: Non-focal neurological exam


       Peripheral neuropathy vs LS spinal stenosis.


       No suggestion of CVA


       Consider viral syndrome, Nephritis, SBE.





SUGGEST: ID, Renal, Cardiology


               R/O MI, Echocardiogram, blood cultures


               Check CK, B12, TSH.


               Continue Eliquis.





Thank you very much,


Deep Arreguin MD

## 2019-11-01 NOTE — EKG
Test Reason : 

Blood Pressure : ***/*** mmHG

Vent. Rate : 056 BPM     Atrial Rate : 066 BPM

   P-R Int : 000 ms          QRS Dur : 142 ms

    QT Int : 466 ms       P-R-T Axes : 000 070 021 degrees

   QTc Int : 449 ms

 

ATRIAL FIBRILLATION WITH SLOW VENTRICULAR RESPONSE

RIGHT BUNDLE BRANCH BLOCK

CANNOT RULE OUT INFERIOR INFARCT , AGE UNDETERMINED

ABNORMAL ECG

WHEN COMPARED WITH ECG OF 26-JAN-2018 23:31,

ATRIAL FIBRILLATION HAS REPLACED SINUS RHYTHM

Confirmed by RADHA CHASE, PARADISE ([id number]) on 11/1/2019 2:11:49 PM

 

Referred By: DR COPE           Confirmed By:PARADISE GARCIA MD

## 2019-11-01 NOTE — ECHO
______________________________________________________________________________



Name: GHAZALA GILMORE                                 Exam:Adult Echocardiogram

MRN: [id number]         Study Date: 2019 01:38 PM

Age: 84 yrs

______________________________________________________________________________



Reason For Study: CVA

Height: 67 in        Weight: 209 lb        BSA: 2.1 m2



______________________________________________________________________________



MMode/2D Measurements & Calculations

IVSd: 1.4 cm                                          Ao root diam: 3.1 cm

LVIDd: 4.1 cm                                         LA dimension: 5.8 cm

LVIDs: 2.9 cm

LVPWd: 1.3 cm



_______________________________________________________

EDV(Teich): 74.8 ml                                   LVOT diam: 2.0 cm

ESV(Teich): 32.5 ml



Doppler Measurements & Calculations

MV E max rocío: 166.1 cm/sec

                                                      MV dec slope: 628.4 cm/sec2

_______________________________________________________



Ao V2 max: 289.0 cm/sec                               LV V1 max PG: 3.0 mmHg

Ao max P.4 mmHg                                  LV V1 mean P.6 mmHg

Ao V2 mean: 201.6 cm/sec                              LV V1 max: 86.4 cm/sec

Ao mean P.3 mmHg                                 LV V1 mean: 60.0 cm/sec

Ao V2 VTI: 59.0 cm                                    LV V1 VTI: 20.2 cm

HENRY(I,D): 1.1 cm2

HENRY(V,D): 0.94 cm2



_______________________________________________________

SV(LVOT): 63.4 ml                                     TR max rcoío: 251.9 cm/sec

                                                      TR max P.5 mmHg



_______________________________________________________

PA V2 max: 129.5 cm/sec                               PI end-d rocío: 128.2 cm/sec

PA max P.7 mmHg





______________________________________________________________________________

Left Ventricle

There is moderate concentric left ventricular hypertrophy. Left ventricular systolic function is norm
al.

Ejection Fraction = 50-55%.

Right Ventricle

The right ventricle is grossly normal size. The right ventricular systolic function is grossly normal
.

Atria

The left atrium is severely dilated. Right atrium is small.

Mitral Valve

There is moderate to severe mitral annular calcification. There is trace mitral regurgitation.

Tricuspid Valve

The tricuspid valve is normal in structure and function. There is mild tricuspid regurgitation. Right


ventricular systolic pressure is elevated at 30-40mmHg.

Aortic Valve

Moderate valvular aortic stenosis. No aortic regurgitation is present.

Pulmonic Valve

The pulmonic valve is not well seen, but is grossly normal. There is no pulmonic valvular stenosis. M
ild

pulmonic valvular regurgitation.

Great Vessels

The aortic root is normal size.

Pericardium/Pleura

There is no pericardial effusion.



______________________________________________________________________________



Interpretation Summary

There is moderate concentric left ventricular hypertrophy.

Left ventricular systolic function is normal.

Ejection Fraction = 50-55%.

The right ventricular systolic function is grossly normal.

The left atrium is severely dilated.

There is moderate to severe mitral annular calcification.

There is mild tricuspid regurgitation.

Right ventricular systolic pressure is elevated at 30-40mmHg.

Moderate valvular aortic stenosis.

There is no pericardial effusion.





MD Buck *Ritu 2019 03:03 PM

## 2019-11-01 NOTE — PN
Physical Exam: 


SUBJECTIVE: Patient seen and examined at [place] ED then again on transfer 

to [facility].





patient family at bedside who expressed concern over patient having fevers.  

they report that patient is a  who then entered a dark wet tunnel work 

and since that time he has been having severe back pain, fevers and chills. 





patient transferred from [facility] to [facility] for closer monitoring. 





OBJECTIVE:


Patient is an 84 year old male with a significant past medical history of 

hypertension, hyperlipidemia, prosthetic aortic valve replacement (bovine). 

afib (on eliquis).   He presented to the [facility] ED with ataxia, chills, rigors and 

weakness.  patient had rigors at home and brought into the ED by his family for 

further evaluation.  





 Vital Signs











 Period  Temp  Pulse  Resp  BP Sys/Rosales  Pulse Ox


 


 Last 24 Hr  98.7 F-100.1 F    16-20  144-195/68-93  93-99








GENERAL: The patient is awake, alert, and fully oriented, in mild respiratory 

distress on supplmental oxygen


HEAD: Normal with no signs of trauma.


EYES: PERRL, extraocular movements intact, sclera anicteric, conjunctiva clear. 

No ptosis. 


ENT: Ears normal, nares patent, oropharynx clear without exudates, moist mucous 

membranes.


NECK: Trachea midline, full range of motion, supple. 


LUNGS: Breath sounds equal, but diminished bilaterally


HEART: Regular rate and rhythm, sinus refugio


ABDOMEN: Soft, nontender, nondistended, obese abdomen


EXTREMITIES:  no edema. 


NEUROLOGICAL:  Normal speech, gait not observed.


PSYCH: Normal mood, normal affect.


SKIN: Warm, dry, normal turgor, no rashes or lesions noted


 Laboratory Results - last 24 hr











  10/31/19 10/31/19 10/31/19





  20:00 20:00 20:00


 


WBC    4.6


 


RBC    4.90


 


Hgb    14.4


 


Hct    44.4


 


MCV    90.6


 


MCH    29.5


 


MCHC    32.5


 


RDW    12.5


 


Plt Count    162  D


 


MPV    7.6


 


Absolute Neuts (auto)    3.6


 


Neutrophils %    77.1


 


Lymphocytes %    8.8  D


 


Monocytes %    12.9 H


 


Eosinophils %    0.9


 


Basophils %    0.3


 


Sodium   137 


 


Potassium   4.1 


 


Chloride   100 


 


Carbon Dioxide   27 


 


Anion Gap   10 


 


BUN   19.0 H 


 


Creatinine   1.3 


 


Est GFR (CKD-EPI)AfAm   58.89 


 


Est GFR (CKD-EPI)NonAf   50.81 


 


Random Glucose   116 H 


 


Hemoglobin A1c %   


 


Lactic Acid   


 


Calcium   9.0 


 


Phosphorus   


 


Magnesium   


 


Total Bilirubin   1.3 H 


 


AST   24 


 


ALT   16 


 


Alkaline Phosphatase   76 


 


Creatine Kinase   100 


 


Troponin I  0.04  


 


B-Natriuretic Peptide   


 


Total Protein   6.7 


 


Albumin   3.9 


 


Triglycerides   


 


Cholesterol   


 


Total LDL Cholesterol   


 


HDL Cholesterol   


 


Urine Color   


 


Urine Appearance   


 


Urine pH   


 


Urine Protein   


 


Urine Glucose (UA)   


 


Urine Ketones   


 


Urine Blood   


 


Urine Nitrite   


 


Urine Bilirubin   


 


Urine Urobilinogen   


 


Ur Leukocyte Esterase   


 


Urine RBC   


 


Urine WBC   


 


Urine Bacteria   


 


Influenza A (Rapid)   


 


Influenza B (Rapid)   














  10/31/19 10/31/19 11/01/19





  20:00 21:25 06:30


 


WBC   


 


RBC   


 


Hgb   


 


Hct   


 


MCV   


 


MCH   


 


MCHC   


 


RDW   


 


Plt Count   


 


MPV   


 


Absolute Neuts (auto)   


 


Neutrophils %   


 


Lymphocytes %   


 


Monocytes %   


 


Eosinophils %   


 


Basophils %   


 


Sodium    135 L


 


Potassium    3.5


 


Chloride    103


 


Carbon Dioxide    27


 


Anion Gap    5 L


 


BUN    18.0


 


Creatinine    1.3


 


Est GFR (CKD-EPI)AfAm    58.89


 


Est GFR (CKD-EPI)NonAf    50.81


 


Random Glucose    98


 


Hemoglobin A1c %   


 


Lactic Acid  1.3  


 


Calcium    8.5


 


Phosphorus    3.0


 


Magnesium    2.0


 


Total Bilirubin   


 


AST   


 


ALT   


 


Alkaline Phosphatase   


 


Creatine Kinase   


 


Troponin I   


 


B-Natriuretic Peptide   


 


Total Protein   


 


Albumin   


 


Triglycerides   


 


Cholesterol   


 


Total LDL Cholesterol   


 


HDL Cholesterol   


 


Urine Color   Yellow 


 


Urine Appearance   Clear 


 


Urine pH   7.0 


 


Urine Protein   2+ H 


 


Urine Glucose (UA)   Negative 


 


Urine Ketones   Trace 


 


Urine Blood   2+ H 


 


Urine Nitrite   Negative 


 


Urine Bilirubin   Negative 


 


Urine Urobilinogen   1.0 


 


Ur Leukocyte Esterase   Negative 


 


Urine RBC   10-20 


 


Urine WBC   0-2 


 


Urine Bacteria   Few 


 


Influenza A (Rapid)   


 


Influenza B (Rapid)   














  11/01/19 11/01/19 11/01/19





  06:30 06:30 06:30


 


WBC   3.1 L 


 


RBC   4.28 


 


Hgb   12.7 


 


Hct   38.4 


 


MCV   89.7 


 


MCH   29.8 


 


MCHC   33.2 


 


RDW   12.5 


 


Plt Count   130 L 


 


MPV   7.7 


 


Absolute Neuts (auto)   2.3 


 


Neutrophils %   71.6 


 


Lymphocytes %   13.5  D 


 


Monocytes %   13.3 H 


 


Eosinophils %   1.0 


 


Basophils %   0.6 


 


Sodium   


 


Potassium   


 


Chloride   


 


Carbon Dioxide   


 


Anion Gap   


 


BUN   


 


Creatinine   


 


Est GFR (CKD-EPI)AfAm   


 


Est GFR (CKD-EPI)NonAf   


 


Random Glucose   


 


Hemoglobin A1c %  5.5  


 


Lactic Acid   


 


Calcium   


 


Phosphorus   


 


Magnesium   


 


Total Bilirubin   


 


AST   


 


ALT   


 


Alkaline Phosphatase   


 


Creatine Kinase   


 


Troponin I   


 


B-Natriuretic Peptide   


 


Total Protein   


 


Albumin   


 


Triglycerides    47


 


Cholesterol    99


 


Total LDL Cholesterol    55


 


HDL Cholesterol    35 L


 


Urine Color   


 


Urine Appearance   


 


Urine pH   


 


Urine Protein   


 


Urine Glucose (UA)   


 


Urine Ketones   


 


Urine Blood   


 


Urine Nitrite   


 


Urine Bilirubin   


 


Urine Urobilinogen   


 


Ur Leukocyte Esterase   


 


Urine RBC   


 


Urine WBC   


 


Urine Bacteria   


 


Influenza A (Rapid)   


 


Influenza B (Rapid)   














  11/01/19 11/01/19 11/01/19





  06:30 06:30 11:20


 


WBC   


 


RBC   


 


Hgb   


 


Hct   


 


MCV   


 


MCH   


 


MCHC   


 


RDW   


 


Plt Count   


 


MPV   


 


Absolute Neuts (auto)   


 


Neutrophils %   


 


Lymphocytes %   


 


Monocytes %   


 


Eosinophils %   


 


Basophils %   


 


Sodium   


 


Potassium   


 


Chloride   


 


Carbon Dioxide   


 


Anion Gap   


 


BUN   


 


Creatinine   


 


Est GFR (CKD-EPI)AfAm   


 


Est GFR (CKD-EPI)NonAf   


 


Random Glucose   


 


Hemoglobin A1c %   


 


Lactic Acid   


 


Calcium   


 


Phosphorus   


 


Magnesium   


 


Total Bilirubin   


 


AST   


 


ALT   


 


Alkaline Phosphatase   


 


Creatine Kinase   


 


Troponin I  0.05  


 


B-Natriuretic Peptide   9668.7 H 


 


Total Protein   


 


Albumin   


 


Triglycerides   


 


Cholesterol   


 


Total LDL Cholesterol   


 


HDL Cholesterol   


 


Urine Color   


 


Urine Appearance   


 


Urine pH   


 


Urine Protein   


 


Urine Glucose (UA)   


 


Urine Ketones   


 


Urine Blood   


 


Urine Nitrite   


 


Urine Bilirubin   


 


Urine Urobilinogen   


 


Ur Leukocyte Esterase   


 


Urine RBC   


 


Urine WBC   


 


Urine Bacteria   


 


Influenza A (Rapid)    Negative


 


Influenza B (Rapid)    Negative














  11/01/19





  13:30


 


WBC 


 


RBC 


 


Hgb 


 


Hct 


 


MCV 


 


MCH 


 


MCHC 


 


RDW 


 


Plt Count 


 


MPV 


 


Absolute Neuts (auto) 


 


Neutrophils % 


 


Lymphocytes % 


 


Monocytes % 


 


Eosinophils % 


 


Basophils % 


 


Sodium 


 


Potassium 


 


Chloride 


 


Carbon Dioxide 


 


Anion Gap 


 


BUN 


 


Creatinine 


 


Est GFR (CKD-EPI)AfAm 


 


Est GFR (CKD-EPI)NonAf 


 


Random Glucose 


 


Hemoglobin A1c % 


 


Lactic Acid 


 


Calcium 


 


Phosphorus 


 


Magnesium 


 


Total Bilirubin 


 


AST 


 


ALT 


 


Alkaline Phosphatase 


 


Creatine Kinase 


 


Troponin I  0.05


 


B-Natriuretic Peptide 


 


Total Protein 


 


Albumin 


 


Triglycerides 


 


Cholesterol 


 


Total LDL Cholesterol 


 


HDL Cholesterol 


 


Urine Color 


 


Urine Appearance 


 


Urine pH 


 


Urine Protein 


 


Urine Glucose (UA) 


 


Urine Ketones 


 


Urine Blood 


 


Urine Nitrite 


 


Urine Bilirubin 


 


Urine Urobilinogen 


 


Ur Leukocyte Esterase 


 


Urine RBC 


 


Urine WBC 


 


Urine Bacteria 


 


Influenza A (Rapid) 


 


Influenza B (Rapid) 








Active Medications











Generic Name Dose Route Start Last Admin





  Trade Name Freq  PRN Reason Stop Dose Admin


 


Acetaminophen  650 mg  11/01/19 02:18  11/01/19 11:41





  Tylenol -  PO   650 mg





  Q6H PRN   Administration





  FEVER   





     





     





     


 


Apixaban  5 mg  11/01/19 10:00  11/01/19 10:55





  Eliquis -  PO   5 mg





  BID LARISA   Administration





     





     





     





     


 


Cyanocobalamin  1,000 mcg  11/01/19 10:00  11/01/19 10:55





  Vitamin B12 -  PO   1,000 mcg





  DAILY LARISA   Administration





     





     





     





     


 


Furosemide  40 mg  11/01/19 10:00  11/01/19 11:48





  Lasix -  PO   Not Given





  DAILY LARISA   





     





     





     





     


 


Cefepime HCl  1 gm in 50 mls @ 100 mls/hr  11/01/19 14:30  11/01/19 14:42





  Maxipime 1 Gm Premix Ivpb  IVPB   100 mls/hr





  Q8H-IV LARISA   Administration





     





     





  Protocol   





     


 


Vancomycin HCl 1,500 mg/  500 mls @ 250 mls/hr  11/01/19 15:00  





  Dextrose  IVPB  11/01/19 16:59  





  ONCE ONE   





     





     





     





     


 


Metoprolol Succinate  25 mg  11/01/19 10:00  11/01/19 10:55





  Toprol Xl -  PO   25 mg





  DAILY LARISA   Administration





     





     





     





     


 


Rosuvastatin Calcium  5 mg  11/01/19 22:00  





  Crestor -  PO   





  HS LARISA   





     





     





     





     











ASSESSMENT/PLAN:








Problem List





- Problems


(1) Severe sepsis


Assessment/Plan: 


Patient with high grade fevers, chills, malaise, and fatigue/weakness


pancultured


started on antibiotics per ID


Requires cardiac monitoring


blood and urine cultures pending


Fever of unknown origin, started on emperic antibiotics pending cultures


chest ct shows mediastinal adenopaty with moderate increase since prior exam


discussed with ID, will send out for lumbar/thoracic spine CT to rule out 

abscess, abd ct to rule out gi source of fever


Code(s): A41.9 - SEPSIS, UNSPECIFIED ORGANISM; R65.20 - SEVERE SEPSIS WITHOUT 

SEPTIC SHOCK   





(2) DIPTI (obstructive sleep apnea)


Assessment/Plan: 


continue home cpap


Code(s): G47.33 - OBSTRUCTIVE SLEEP APNEA (ADULT) (PEDIATRIC)   





(3) HLD (hyperlipidemia)


Assessment/Plan: 


continue home meds


Code(s): E78.5 - HYPERLIPIDEMIA, UNSPECIFIED   





(4) HTN (hypertension)


Assessment/Plan: 


elevated, on metoprolol.  


given one dose of low dose norvasc


Code(s): I10 - ESSENTIAL (PRIMARY) HYPERTENSION   





(5) S/P aortic valve replacement


Assessment/Plan: 


discussed with patient's cardiologist who is concerned for endocarditis as 

source of fever


echo noted.  may need further cardiac workup if fevers persist. 


Code(s): Z95.2 - PRESENCE OF PROSTHETIC HEART VALVE   





(6) Afib


Assessment/Plan: 


on eliquis 5 bid and metoprol


Code(s): I48.91 - UNSPECIFIED ATRIAL FIBRILLATION   





Visit type





- Emergency Visit


Emergency Visit: Yes


ED Registration Date: 11/01/19


Care time: The patient presented to the Emergency Department on the above date 

and was hospitalized for further evaluation of their emergent condition.





- New Patient


This patient is new to me today: Yes


Date on this admission: 11/05/19





- Critical Care


Critical Care patient: No





- Discharge Referral


Referred to [facility] No

## 2019-11-02 LAB
ALBUMIN SERPL-MCNC: 3.6 G/DL (ref 3.4–5)
ALP SERPL-CCNC: 132 U/L (ref 45–117)
ALT SERPL-CCNC: 37 U/L (ref 13–61)
ANION GAP SERPL CALC-SCNC: 12 MMOL/L (ref 8–16)
AST SERPL-CCNC: 55 U/L (ref 15–37)
BASOPHILS # BLD: 0.6 % (ref 0–2)
BASOPHILS # BLD: 0.9 % (ref 0–2)
BILIRUB SERPL-MCNC: 1.8 MG/DL (ref 0.2–1)
BUN SERPL-MCNC: 19 MG/DL (ref 7–18)
CALCIUM SERPL-MCNC: 8.6 MG/DL (ref 8.5–10.1)
CHLORIDE SERPL-SCNC: 102 MMOL/L (ref 98–107)
CO2 SERPL-SCNC: 24 MMOL/L (ref 21–32)
CREAT SERPL-MCNC: 1.3 MG/DL (ref 0.55–1.3)
DEPRECATED RDW RBC AUTO: 13.5 % (ref 11.9–15.9)
DEPRECATED RDW RBC AUTO: 13.5 % (ref 11.9–15.9)
EOSINOPHIL # BLD: 0.3 % (ref 0–4.5)
EOSINOPHIL # BLD: 0.3 % (ref 0–4.5)
GLUCOSE SERPL-MCNC: 102 MG/DL (ref 74–106)
HCT VFR BLD CALC: 42.7 % (ref 35.4–49)
HCT VFR BLD CALC: 45.2 % (ref 35.4–49)
HGB BLD-MCNC: 14.8 GM/DL (ref 11.7–16.9)
HGB BLD-MCNC: 15.1 GM/DL (ref 11.7–16.9)
LYMPHOCYTES # BLD: 11.7 % (ref 8–40)
LYMPHOCYTES # BLD: 13.2 % (ref 8–40)
MAGNESIUM SERPL-MCNC: 2.2 MG/DL (ref 1.8–2.4)
MCH RBC QN AUTO: 29.7 PG (ref 25.7–33.7)
MCH RBC QN AUTO: 30.8 PG (ref 25.7–33.7)
MCHC RBC AUTO-ENTMCNC: 33.3 G/DL (ref 32–35.9)
MCHC RBC AUTO-ENTMCNC: 34.6 G/DL (ref 32–35.9)
MCV RBC: 89.1 FL (ref 80–96)
MCV RBC: 89.1 FL (ref 80–96)
MONOCYTES # BLD AUTO: 7.4 % (ref 3.8–10.2)
MONOCYTES # BLD AUTO: 8 % (ref 3.8–10.2)
NEUTROPHILS # BLD: 77.6 % (ref 42.8–82.8)
NEUTROPHILS # BLD: 80 % (ref 42.8–82.8)
PHOSPHATE SERPL-MCNC: 3.2 MG/DL (ref 2.5–4.9)
PLATELET # BLD AUTO: 105 K/MM3 (ref 134–434)
PLATELET # BLD AUTO: 96 K/MM3 (ref 134–434)
PMV BLD: 7.5 FL (ref 7.5–11.1)
PMV BLD: 7.9 FL (ref 7.5–11.1)
POTASSIUM SERPLBLD-SCNC: 3.8 MMOL/L (ref 3.5–5.1)
PROT SERPL-MCNC: 7.4 G/DL (ref 6.4–8.2)
RBC # BLD AUTO: 4.79 M/MM3 (ref 4–5.6)
RBC # BLD AUTO: 5.08 M/MM3 (ref 4–5.6)
SODIUM SERPL-SCNC: 138 MMOL/L (ref 136–145)
WBC # BLD AUTO: 2.5 K/MM3 (ref 4–10)
WBC # BLD AUTO: 3.3 K/MM3 (ref 4–10)

## 2019-11-02 RX ADMIN — CEFEPIME HYDROCHLORIDE SCH MLS/HR: 1 INJECTION, POWDER, FOR SOLUTION INTRAMUSCULAR; INTRAVENOUS at 17:33

## 2019-11-02 RX ADMIN — ROSUVASTATIN CALCIUM SCH MG: 5 TABLET, FILM COATED ORAL at 22:33

## 2019-11-02 RX ADMIN — CYANOCOBALAMIN TAB 1000 MCG SCH MCG: 1000 TAB at 12:52

## 2019-11-02 RX ADMIN — FUROSEMIDE SCH MG: 10 INJECTION, SOLUTION INTRAVENOUS at 13:22

## 2019-11-02 RX ADMIN — APIXABAN SCH MG: 5 TABLET, FILM COATED ORAL at 10:15

## 2019-11-02 RX ADMIN — ACETAMINOPHEN PRN MG: 325 TABLET ORAL at 15:40

## 2019-11-02 RX ADMIN — AMLODIPINE BESYLATE SCH MG: 2.5 TABLET ORAL at 10:15

## 2019-11-02 RX ADMIN — CEFEPIME HYDROCHLORIDE SCH MLS/HR: 1 INJECTION, POWDER, FOR SOLUTION INTRAMUSCULAR; INTRAVENOUS at 10:13

## 2019-11-02 RX ADMIN — CEFEPIME HYDROCHLORIDE SCH MLS/HR: 1 INJECTION, POWDER, FOR SOLUTION INTRAMUSCULAR; INTRAVENOUS at 03:05

## 2019-11-02 RX ADMIN — ACETAMINOPHEN PRN MG: 325 TABLET ORAL at 06:52

## 2019-11-02 RX ADMIN — APIXABAN SCH MG: 5 TABLET, FILM COATED ORAL at 21:56

## 2019-11-02 RX ADMIN — DOXYCYCLINE SCH MLS/HR: 100 INJECTION, POWDER, LYOPHILIZED, FOR SOLUTION INTRAVENOUS at 15:38

## 2019-11-02 RX ADMIN — FUROSEMIDE SCH MG: 10 INJECTION, SOLUTION INTRAVENOUS at 06:52

## 2019-11-02 RX ADMIN — ACETAMINOPHEN PRN MG: 325 TABLET ORAL at 22:33

## 2019-11-02 RX ADMIN — DOXYCYCLINE SCH MLS/HR: 100 INJECTION, POWDER, LYOPHILIZED, FOR SOLUTION INTRAVENOUS at 21:56

## 2019-11-02 NOTE — CONSULT
Consult


Consult Specialty:: Hematology





- History of Present Illness


Chief Complaint: Thrombocytopenia and Leukopenia


History of Present Illness: 





83 y/o gentleman with a PMhx of HTN, HLD, s/p Prosthetic Aortic Valve (Bovine). 

Who presented to the ED with his family for ataxia, chills, rigors, and 

weakness. He developed profound fatigue and chills and was admitted to the 

MICU. Hematology is consulted due to thrombocytopenia and leukopenia.  Pt 

denied any bleeding or easy bruising.  He was visiting [facility] 2 weeks ago. 

Does not report travelling to [place]. During my interview complained of 

sustained chills.





- History Source


History Provided By: Patient


Limitations to Obtaining History: No Limitations





- Past Medical History


Cardio/Vascular: Yes: Aortic Insufficiency, HTN, Hyperlipdemia


Renal/: Yes: Cancer (prostate (seed implant))


Infectious Disease: Yes: MRSA (perianal abcess)





- Past Surgical History


Past Surgical History: Yes: Valve Replacement (aortic (on Eliquis))





- Alcohol/Substance Use


Hx Alcohol Use: No


History of Substance Use: reports: None





- Smoking History


Smoking history: Former smoker


Have you smoked in the past 12 months: No


Aproximately how many cigarettes per day: 0


If you are a former smoker, when did you quit?: years ago





- Social History


ADL: Independent


History of Recent Travel: No





Home Medications





- Allergies


Allergies/Adverse Reactions: 


 Allergies











Allergy/AdvReac Type Severity Reaction Status Date / Time


 


No Known Allergies Allergy   Verified 10/31/19 18:44














- Home Medications


Home Medications: 


Ambulatory Orders





Rosuvastatin Calcium [Crestor] 5 mg PO DAILY #0 tablet 09/11/12 


Metoprolol Succinate [Toprol XL -] 25 mg PO DAILY 06/18/14 


Furosemide 40 mg PO DAILY 01/16/18 


Olmesartan Medoxomil [Benicar] 20 mg PO DAILY 01/16/18 


Apixaban [Eliquis] 5 mg PO BID 11/01/19 


Cyanocobalamin [Vitamin B12 -] 1,000 mcg PO DAILY 11/01/19 


Olmesartan Medoxomil 20 mg PO DAILY 11/02/19 











Review of Systems





- Review of Systems


Constitutional: reports: Chills


Eyes: reports: No Symptoms


HENT: reports: No Symptoms


Neck: reports: No Symptoms


Cardiovascular: reports: No Symptoms


Respiratory: reports: No Symptoms


Gastrointestinal: reports: No Symptoms


Genitourinary: reports: No Symptoms


Breasts: reports: No Symptoms Reported


Musculoskeletal: reports: No Symptoms


Integumentary: reports: No Symptoms


Neurological: reports: No Symptoms


Endocrine: reports: No Symptoms


Hematology/Lymphatic: reports: No Symptoms


Psychiatric: reports: No Symptoms





Physical Exam


Vital Signs: 


 Vital Signs











Temperature  100.9 F H  11/02/19 17:54


 


Pulse Rate  62   11/02/19 17:54


 


Respiratory Rate  25 H  11/02/19 17:54


 


Blood Pressure  154/74   11/02/19 18:00


 


O2 Sat by Pulse Oximetry (%)  97   11/02/19 18:20











Constitutional: Yes: Well Nourished


Eyes: Yes: WNL


HENT: Yes: WNL


Neck: Yes: WNL


Cardiovascular: Yes: Regular Rate and Rhythm


Respiratory: Yes: WNL, Regular, CTA Bilaterally


Gastrointestinal: Yes: WNL, Normal Bowel Sounds, Soft


Musculoskeletal: Yes: WNL


Extremities: Yes: WNL


Integumentary: Yes: WNL


Wound/Incision: Yes: Clean/Dry, Well Approximated.  No: Sutures Intact, Staples 

Intact, Steri Strips, Open to air, Dressing Dry and Intact, Dressing Removed, 

Staples Removed, Sutures Removed, Draining, Reddened, Bleeding, Excoriated, 

Unapproximated, Other


Neurological: Yes: WNL


Labs: 


 CBC, BMP





 11/02/19 15:10 





 11/02/19 05:51 











Problem List





- Problems


(1) Leukopenia


Code(s): D72.819 - DECREASED WHITE BLOOD CELL COUNT, UNSPECIFIED   





(2) Severe sepsis


Code(s): A41.9 - SEPSIS, UNSPECIFIED ORGANISM; R65.20 - SEVERE SEPSIS WITHOUT 

SEPTIC SHOCK   





(3) Thrombocytopenia


Code(s): D69.6 - THROMBOCYTOPENIA, UNSPECIFIED   





Assessment/Plan





83 y/o gentleman with a PMHx of HTN, HLD, Prosthetic Aortic Valve ( Bovine) 

admitted to the MICU for sepsis and hematology called due to leukopenia and 

thrombocytopenia.





Recommend:





1) Thrombocytopenia and Leukopenia: Due to sepsis. Pt still febrile with chills 

and symptoms of systemic infection. Hemodynamically stable. Coagulation tests 

may be sometimes abnormal as well due to DIC.


2) If patient ever on heparin products such as heparin drip or heparin flushes 

and 4T Score 4 or above will send HIT antibody (It is unclear if he ever was on 

heparin of any sort but if so HIT will need to be ruled out).


3) Antibiotic therapy per ID. Multiple cultures and tests pending


4) Thank you very much for this consultation.

## 2019-11-02 NOTE — CONSULT
Consult - text type





- Consultation


Consultation Note: 





PULM/CRITICAL CARE MEDICINE CONSULT:





Briefly, 85 y/o M with HTN, AS s/p porcine valve 2009, AF on AC who presented 

to the ED yesterday with high fevers, rigors and altered mental status. Fevers 

and rigors started the evening of 10/31. Labs were significant for leukopenia. 

He was cultured, started on abx (Cefepime/Vanc) and admitted to the floor. He 

was persistently febrile while on the floor. The working diagnosis was r/o 

endocarditis or spinal/epidural abscess. 





He has a h/o MRSA in a wound years ago. His last dental procedure was 6 months 

ago. He takes abx for SBE PPx prior to dental procedures. He has no sick 

contacts or recent travel. He denies cough, SOB, sputum production. No N/V/D. 

No urinary symptoms. 





Blood cultures so far are negative, CT of the thoracic and lumbar spin was done 

along with CT of the chest, abdomen and pelvis. No obvious source was 

identified on CT, however these were non-contrast studies. He was eventually 

admitted to the ICU for persistent fevers. 








 Current Medications





Acetaminophen (Tylenol -)  650 mg PO Q6H PRN


   PRN Reason: FEVER


   Last Admin: 11/02/19 06:52 Dose:  650 mg


Amlodipine Besylate (Norvasc -)  2.5 mg PO DAILY [facility]


   Last Admin: 11/02/19 10:15 Dose:  2.5 mg


Apixaban (Eliquis -)  5 mg PO BID [facility]


   Last Admin: 11/02/19 10:15 Dose:  5 mg


Cyanocobalamin (Vitamin B12 -)  1,000 mcg PO DAILY [facility]


   Last Admin: 11/01/19 10:55 Dose:  1,000 mcg


Furosemide (Lasix Injection -)  40 mg IVPUSH BID@0600,1400 [facility]


   Last Admin: 11/02/19 06:52 Dose:  40 mg


Cefepime HCl 1 gm/ Dextrose  100 mls @ 200 mls/hr IVPB Q8H-IV LARISA; Protocol


   Last Admin: 11/02/19 10:13 Dose:  200 mls/hr


Metoprolol Succinate (Toprol Xl -)  25 mg PO DAILY [facility]


   Last Admin: 11/02/19 10:15 Dose:  25 mg


Rosuvastatin Calcium (Crestor -)  5 mg PO HS [facility]


   Last Admin: 11/01/19 22:52 Dose:  5 mg





 Current Medications





Acetaminophen (Tylenol -)  650 mg PO Q6H PRN


   PRN Reason: FEVER


   Last Admin: 11/02/19 06:52 Dose:  650 mg


Amlodipine Besylate (Norvasc -)  2.5 mg PO DAILY [facility]


   Last Admin: 11/02/19 10:15 Dose:  2.5 mg


Apixaban (Eliquis -)  5 mg PO BID [facility]


   Last Admin: 11/02/19 10:15 Dose:  5 mg


Cyanocobalamin (Vitamin B12 -)  1,000 mcg PO DAILY [facility]


   Last Admin: 11/01/19 10:55 Dose:  1,000 mcg


Furosemide (Lasix Injection -)  40 mg IVPUSH BID@0600,1400 [facility]


   Last Admin: 11/02/19 06:52 Dose:  40 mg


Cefepime HCl 1 gm/ Dextrose  100 mls @ 200 mls/hr IVPB Q8H-IV LARISA; Protocol


   Last Admin: 11/02/19 10:13 Dose:  200 mls/hr


Metoprolol Succinate (Toprol Xl -)  25 mg PO DAILY [facility]


   Last Admin: 11/02/19 10:15 Dose:  25 mg


Rosuvastatin Calcium (Crestor -)  5 mg PO HS [facility]


   Last Admin: 11/01/19 22:52 Dose:  5 mg








EXAM:


neuro: Awake, oriented to person, place but not time (Tuesday for Saturday), YBARRA

, non-focal, no nuchal rigidity, negative Brudzinski's sign


HEENT: PERRL, anicteric sclera


Lungs: clear


Heart: AF


Abd: Soft, non-tender, obese


Ext: no edema, warm


Skin: warm, no signs of abscess or cellulitis  





 CBC, BMP





 11/02/19 05:51 





 11/02/19 05:51 








ASSESSMENT:


Fever of unknown origin with altered mental status


Sepsis


Leukopenia


AF on chronic AC


AS s/p porcine valve 2009


HTN


CHF


DIPTI


COPD


CKD





PLAN:


-Abx per ID


-Would ideally perform LP to r/o meningitis in this patient with high fevers 

and altered mental status, but he was continued on Eliquis. He could be 

empirically treated without LP for bacterial and HSV, but will defer to ID.


-Follow up all cultures and imaging - may need contrast study or MRI


-Continue Lasix


-Nebs PRN


-CPAP at night and when sleeping


-Oxygen to maintain SpO2 >92


-Rate control


-Eliquis would need to be held for at least 24hrs prior to LP - can bridge with 

Heparin drip and d/c 4hrs prior to LP and check PTT


-GI PPx while on AC





Thank you for this interesting consult





Efra Kapoor


Pulm/Critical Care NP

## 2019-11-02 NOTE — PN
Physical Exam: 


SUBJECTIVE: Patient seen and examined at the bedside.  awake and alert, appears 

weak/at times lethargic, but answering questions appropriately.





OBJECTIVE:


labs show leukopenia and thrombocytopenia





Patient is an 84 year old male with a significant past medical history of 

hypertension, hyperlipidemia, prosthetic aortic valve replacement (bovine). 

afib (on eliquis).   He presented to the [facility] ED with ataxia, chills, rigors and 

weakness.  patient had rigors at home and brought into the ED by his family for 

further evaluation.    He transferred to the ICU closer monitoring.  today's 

labs noted to have decrease in wbc and platelets.  will send out for a viral 

panel. 





 Vital Signs











 Period  Temp  Pulse  Resp  BP Sys/Rosales  Pulse Ox


 


 Last 24 Hr  98.7 F-103.6 F  50-77  15-33  130-228/  95-98








GENERAL: The patient is awake, alert, weak appearing, in mild respiratory 

distress on supplemental oxygen


HEAD: Normal with no signs of trauma.


EYES: PERRL, extraocular movements intact, sclera anicteric, conjunctiva clear. 

No ptosis. 


ENT: Ears normal, nares patent, oropharynx clear without exudates, moist mucous 

membranes.


NECK: Trachea midline, full range of motion, supple. 


LUNGS: Breath sounds equal, but diminished bilaterally, no wheeze


HEART: Regular rate and rhythm, sinus refugio


ABDOMEN: Soft, nontender, nondistended, obese abdomen


EXTREMITIES:  no edema. 


NEUROLOGICAL:  Normal speech, gait not observed.


PSYCH: Normal mood, normal affect.


SKIN: Warm, dry, normal turgor, no rashes or lesions noted


 





 Laboratory Results - last 24 hr











  11/01/19 11/02/19 11/02/19





  15:52 05:51 05:51


 


WBC   3.3 L 


 


RBC   5.08 


 


Hgb   15.1 


 


Hct   45.2 


 


MCV   89.1 


 


MCH   29.7 


 


MCHC   33.3 


 


RDW   13.5 


 


Plt Count   105 L D 


 


MPV   7.5 


 


Absolute Neuts (auto)   2.6 


 


Neutrophils %   77.6  D 


 


Lymphocytes %   13.2  D 


 


Monocytes %   8.0 


 


Eosinophils %   0.3  D 


 


Basophils %   0.9 


 


Nucleated RBC %   0 


 


Sodium    138


 


Potassium    3.8


 


Chloride    102


 


Carbon Dioxide    24


 


Anion Gap    12


 


BUN    19.0 H


 


Creatinine    1.3


 


Est GFR (CKD-EPI)AfAm    58.89


 


Est GFR (CKD-EPI)NonAf    50.81


 


POC Glucometer  102  


 


Random Glucose    102


 


Calcium    8.6


 


Phosphorus    3.2


 


Magnesium    2.2


 


Total Bilirubin    1.8 H


 


AST    55 H


 


ALT    37


 


Alkaline Phosphatase    132 H


 


Creatine Kinase    278


 


Creatine Kinase Index    1.0


 


CK-MB (CK-2)    2.87


 


Total Protein    7.4


 


Albumin    3.6


 


Vitamin B12    812


 


TSH    0.80  D








Active Medications











Generic Name Dose Route Start Last Admin





  Trade Name Freq  PRN Reason Stop Dose Admin


 


Acetaminophen  650 mg  11/01/19 02:18  11/02/19 06:52





  Tylenol -  PO   650 mg





  Q6H PRN   Administration





  FEVER   





     





     





     


 


Amlodipine Besylate  2.5 mg  11/02/19 10:00  11/02/19 10:15





  Norvasc -  PO   2.5 mg





  DAILY LARISA   Administration





     





     





     





     


 


Apixaban  5 mg  11/01/19 10:00  11/02/19 10:15





  Eliquis -  PO   5 mg





  BID LARISA   Administration





     





     





     





     


 


Cyanocobalamin  1,000 mcg  11/01/19 10:00  11/02/19 12:52





  Vitamin B12 -  PO   1,000 mcg





  DAILY LARISA   Administration





     





     





     





     


 


Furosemide  40 mg  11/02/19 06:00  11/02/19 13:22





  Lasix Injection -  IVPUSH   40 mg





  BID@0600,1400 LARISA   Administration





     





     





     





     


 


Cefepime HCl 1 gm/ Dextrose  100 mls @ 200 mls/hr  11/01/19 18:40  11/02/19 10:

13





  IVPB   200 mls/hr





  Q8H-IV LARISA   Administration





     





     





  Protocol   





     


 


Metoprolol Succinate  25 mg  11/01/19 10:00  11/02/19 10:15





  Toprol Xl -  PO   25 mg





  DAILY LARISA   Administration





     





     





     





     


 


Rosuvastatin Calcium  5 mg  11/01/19 22:00  11/01/19 22:52





  Crestor -  PO   5 mg





  HS LARISA   Administration





     





     





     





     











ASSESSMENT/PLAN:








Problem List





- Problems


(1) Severe sepsis


Assessment/Plan: 


Patient with high grade fevers, chills, malaise, and fatigue/weakness.  now 

noted with leukopenia and thrombocytopenia.


Fever of unknown origin, started on emperic antibiotics pending cultures, 

doxycycline until tick borne disease can be ruled out with viral panel. 


pancultured, lactic acid wnl


started on antibiotics per ID (cefepime), added doxycline today


blood and urine cultures pending to date, viral panel added.


chest ct shows mediastinal adenopaty with moderate increase since prior exam


CT with gall stones, no acute lb seen, spine CT does not explain source of 

fever


will order abd u/s to better view gallbladder.


discussed with ID


Code(s): A41.9 - SEPSIS, UNSPECIFIED ORGANISM; R65.20 - SEVERE SEPSIS WITHOUT 

SEPTIC SHOCK   





(2) Thrombocytopenia


Assessment/Plan: 


defer using heparin for drop in platelets.  hematology consulted


monitor platelets and repeat cbc this afternoon


discussed with his cardiologist, defer heparin gtt and keep on eliquis


Code(s): D69.6 - THROMBOCYTOPENIA, UNSPECIFIED   





(3) Leukopenia


Assessment/Plan: 


unclear etiology, but concern since patient has severe sepsis and now with low 

WBC count.


viral panel ordered


may be due to infection, however, platelets are decreasing also


repeat cbc and monitor


heme consulted


Code(s): D72.819 - DECREASED WHITE BLOOD CELL COUNT, UNSPECIFIED   





(4) DIPTI (obstructive sleep apnea)


Assessment/Plan: 


continue home cpap


Code(s): G47.33 - OBSTRUCTIVE SLEEP APNEA (ADULT) (PEDIATRIC)   





(5) HLD (hyperlipidemia)


Assessment/Plan: 


continue home meds


Code(s): E78.5 - HYPERLIPIDEMIA, UNSPECIFIED   





(6) HTN (hypertension)


Assessment/Plan: 


elevated, on metoprolol. started on norvasc 2.5mg  


Code(s): I10 - ESSENTIAL (PRIMARY) HYPERTENSION   





(7) S/P aortic valve replacement


Assessment/Plan: 


discussed with patient's cardiologist who is concerned for endocarditis as 

source of fever


echo noted.  may need further cardiac workup if fevers persist. 


Code(s): Z95.2 - PRESENCE OF PROSTHETIC HEART VALVE   





(8) Afib


Assessment/Plan: 


on eliquis 5 bid and metoprol


Code(s): I48.91 - UNSPECIFIED ATRIAL FIBRILLATION   





(9) Prophylactic measure


Assessment/Plan: 


fen


tolerating po


monitor electrolyles


low salt diet as tolerated





on eliquis





full code





Code(s): Z29.9 - ENCOUNTER FOR PROPHYLACTIC MEASURES, UNSPECIFIED   





Visit type





- Emergency Visit


Emergency Visit: Yes


ED Registration Date: 11/01/19


Care time: The patient presented to the Emergency Department on the above date 

and was hospitalized for further evaluation of their emergent condition.





- New Patient


This patient is new to me today: Yes


Date on this admission: 11/02/19





- Critical Care


Critical Care patient: Yes


Total Critical Care Time (in minutes): 60


Critical Care Statement: The care of this patient involved high complexity 

decision making to prevent further life threatening deterioration of the patient

's condition and/or to evaluate & treat vital organ system(s) failure or risk 

of failure.

## 2019-11-02 NOTE — PN
Progress Note, Physician


History of Present Illness: 





patient continues to spike fever


was ams 


now in icu


awake and alert


all results negative so far


currently feels a little better


family in the room





- Current Medication List


Current Medications: 


Active Medications





Acetaminophen (Tylenol -)  650 mg PO Q6H PRN


   PRN Reason: FEVER


   Last Admin: 11/02/19 06:52 Dose:  650 mg


Amlodipine Besylate (Norvasc -)  2.5 mg PO DAILY [facility]


   Last Admin: 11/02/19 10:15 Dose:  2.5 mg


Apixaban (Eliquis -)  5 mg PO BID [facility]


   Last Admin: 11/02/19 10:15 Dose:  5 mg


Cyanocobalamin (Vitamin B12 -)  1,000 mcg PO DAILY [facility]


   Last Admin: 11/02/19 12:52 Dose:  1,000 mcg


Furosemide (Lasix Injection -)  40 mg IVPUSH BID@0600,1400 [facility]


   Last Admin: 11/02/19 13:22 Dose:  40 mg


Cefepime HCl 1 gm/ Dextrose  100 mls @ 200 mls/hr IVPB Q8H-IV LARISA; Protocol


   Last Admin: 11/02/19 10:13 Dose:  200 mls/hr


Doxycycline Hyclate 100 mg/ (Dextrose)  100 mls @ 100 mls/hr IVPB BID [facility]


Metoprolol Succinate (Toprol Xl -)  25 mg PO DAILY [facility]


   Last Admin: 11/02/19 10:15 Dose:  25 mg


Rosuvastatin Calcium (Crestor -)  5 mg PO HS [facility]


   Last Admin: 11/01/19 22:52 Dose:  5 mg











- Objective


Vital Signs: 


 Vital Signs











Temperature  98.2 F   11/02/19 14:00


 


Pulse Rate  55 L  11/02/19 14:00


 


Respiratory Rate  21 H  11/02/19 14:00


 


Blood Pressure  123/71   11/02/19 14:00


 


O2 Sat by Pulse Oximetry (%)  96   11/02/19 13:17











Constitutional: Yes: Calm


Cardiovascular: Yes: Regular Rate and Rhythm


Respiratory: Yes: Regular, CTA Bilaterally


Gastrointestinal: Yes: Normal Bowel Sounds, Soft


Musculoskeletal: Yes: WNL


Extremities: Yes: WNL


Neurological: Yes: Alert, Oriented


Psychiatric: Yes: Alert, Oriented


Labs: 


 CBC, BMP





 11/02/19 05:51 





 11/02/19 05:51 











- ....Imaging


Cat Scan: Report Reviewed, Image Reviewed





Assessment/Plan





patient with multiple medical problems


coming in with fever and weakness and resp issues with high grade fever


on broad spectrum abx


now with drop in his platelets as well as wbc


i am worried that the patient could have viral or parasitic infection


we will work him up for lymes babesia and ehrilichia


i am also going to initiate doxy on the patient


close watch on the patient


monitor fevers and wbc and platelets


await for all the cx reports


rest as per icu and the team





cc 45 min

## 2019-11-02 NOTE — CONS
DATE OF CONSULTATION:  

 

DATE OF DICTATION:  11/02/2019

 

HISTORY:  Patient was admitted with chills, rigors, fever, and a nonproductive cough.

 He also was complaining of back pain.  He was transferred from the telemetry unit to

ICU because of increasing rigors, fever, and disorientation.  Since last evening, he

has become afebrile and is alert and oriented.  He has had no chest pain or

discomfort.  No dyspnea has been reported.  Has intermittent, nonproductive cough. 

There is no history of palpitations, lightheadedness, dizziness, presyncope, or

syncope.

 

MEDICATIONS: 

1.  Eliquis 5 mg p.o. b.i.d. 

2.  Metoprolol succinate 25 mg p.o. daily.

3.  Norvasc 2.5 mg p.o. daily.

4.  Crestor 5 mg p.o. daily.

5.  Lasix 40 mg IV daily.

6.  Cefepime 1 g IV every 8 hours.

7.  Vitamin B12 at 1000 mcg p.o. daily.

 

EXAMINATION:

General:  An 84-year-old gentleman who is in no acute distress.  No pallor, cyanosis,

clubbing, or jaundice.

Vital Signs:  Blood pressure 146/85 mmHg at 1300 hours, pulse 58 beats per minute and

irregularly irregular, temperature 98.6 degrees Fahrenheit (rectal), oxygen

saturation 96 beats per minute and regular.

Neck:  Supple.  No jugular venous distention.  Hepatojugular reflux is negative. 

Carotids are 2+.  Upstrokes are normal.  No bruits are heard.  No thyromegaly is

present.

Heart:  PMI is in the 5th intercostal space.  No heaves or thrills.  S1 is variable. 

S2 is split.  Ejection systolic murmur grade 2/6 is heard at the 2nd right

intercostal space ending in early-to-mid systolic.  No diastolic murmur or gallops

are heard.

Lungs:  Clear on auscultation. 

Abdomen:  Protuberant, soft, and nontender.  No hepatosplenomegaly or palpable masses

are felt.  Bowel sounds are present.  No bruits are heard.

Extremities:  No calf tenderness or dependent edema.  

 

LABORATORY DATA:  CBC November 2, 2019; WBC 3300, hemoglobin 15.1 g/dL, platelet

count 105,000.  Chemistry; sodium 138, potassium 3.8, chloride 102, CO2 is 24 mmol/L,

BUN 19, creatinine 1.3 mg/dL.  Bilirubin 1.8, AST 55, ALT 37, alkaline phosphatase

132.  Blood culture reports are pending.

 

IMPRESSION: 

1.  Fever, rigors, and chills.  Etiology to be determined.

A.  Pneumonia needs exclusions.

B.  Cholecystitis/cholangitis needs exclusion with rising liver function tests.

C.  Bacterial endocarditis needs exclusion.

2.  Hypertension, hypertensive cardiovascular disease.

3.  Persistent atrial fibrillation with controlled ventricular response.

4.  Left ventricular diastolic dysfunction.

5.  Recent acute left ventricular failure.

6.  Hypercholesterolemia.

7.  History of chronic pulmonary disease.

8.  Leukopenia and thrombocytopenia of new onset.

9.  Aortic valvular disease status post bioprosthetic aortic valve replacement.

 

RECOMMENDATIONS:

1.  Follow up ECG.

2.  Gallbladder sonogram.

3.  Evaluation of leukopenia and thrombocytopenia.

4.  Continue current medications.

5.  May need to add hydralazine or increase the dose amlodipine if blood pressure

remains elevated.

6.  Follow up comprehensive metabolic profile and CBC.

 

PROGNOSIS:  Critical.

 

TIME SPENT:  40 minutes.

 

 

SHANNAN [id number]

DD: 11/02/2019 14:02

DT: 11/02/2019 14:24

Job #:  [id number]

## 2019-11-03 LAB
ALBUMIN SERPL-MCNC: 3.2 G/DL (ref 3.4–5)
ALP SERPL-CCNC: 174 U/L (ref 45–117)
ALT SERPL-CCNC: 50 U/L (ref 13–61)
ANION GAP SERPL CALC-SCNC: 7 MMOL/L (ref 8–16)
AST SERPL-CCNC: 82 U/L (ref 15–37)
BASOPHILS # BLD: 0.8 % (ref 0–2)
BILIRUB SERPL-MCNC: 2.2 MG/DL (ref 0.2–1)
BUN SERPL-MCNC: 26.2 MG/DL (ref 7–18)
CALCIUM SERPL-MCNC: 8.6 MG/DL (ref 8.5–10.1)
CHLORIDE SERPL-SCNC: 99 MMOL/L (ref 98–107)
CO2 SERPL-SCNC: 30 MMOL/L (ref 21–32)
CREAT SERPL-MCNC: 1.4 MG/DL (ref 0.55–1.3)
DEPRECATED RDW RBC AUTO: 13.5 % (ref 11.9–15.9)
EOSINOPHIL # BLD: 2 % (ref 0–4.5)
GLUCOSE SERPL-MCNC: 102 MG/DL (ref 74–106)
HCT VFR BLD CALC: 43.6 % (ref 35.4–49)
HGB BLD-MCNC: 15.4 GM/DL (ref 11.7–16.9)
LYMPHOCYTES # BLD: 14 % (ref 8–40)
MAGNESIUM SERPL-MCNC: 2.2 MG/DL (ref 1.8–2.4)
MCH RBC QN AUTO: 31.2 PG (ref 25.7–33.7)
MCHC RBC AUTO-ENTMCNC: 35.3 G/DL (ref 32–35.9)
MCV RBC: 88.3 FL (ref 80–96)
MONOCYTES # BLD AUTO: 8.5 % (ref 3.8–10.2)
NEUTROPHILS # BLD: 74.7 % (ref 42.8–82.8)
PLATELET # BLD AUTO: 77 K/MM3 (ref 134–434)
PMV BLD: 8.3 FL (ref 7.5–11.1)
POTASSIUM SERPLBLD-SCNC: 3.2 MMOL/L (ref 3.5–5.1)
PROT SERPL-MCNC: 6.3 G/DL (ref 6.4–8.2)
RBC # BLD AUTO: 4.93 M/MM3 (ref 4–5.6)
SODIUM SERPL-SCNC: 136 MMOL/L (ref 136–145)
WBC # BLD AUTO: 2.2 K/MM3 (ref 4–10)

## 2019-11-03 RX ADMIN — ROSUVASTATIN CALCIUM SCH MG: 5 TABLET, FILM COATED ORAL at 21:52

## 2019-11-03 RX ADMIN — ACETAMINOPHEN PRN MG: 325 TABLET ORAL at 12:48

## 2019-11-03 RX ADMIN — APIXABAN SCH MG: 5 TABLET, FILM COATED ORAL at 21:52

## 2019-11-03 RX ADMIN — CEFEPIME HYDROCHLORIDE SCH MLS/HR: 1 INJECTION, POWDER, FOR SOLUTION INTRAMUSCULAR; INTRAVENOUS at 03:26

## 2019-11-03 RX ADMIN — APIXABAN SCH MG: 5 TABLET, FILM COATED ORAL at 09:31

## 2019-11-03 RX ADMIN — CEFEPIME HYDROCHLORIDE SCH MLS/HR: 1 INJECTION, POWDER, FOR SOLUTION INTRAMUSCULAR; INTRAVENOUS at 17:29

## 2019-11-03 RX ADMIN — DOXYCYCLINE SCH MLS/HR: 100 INJECTION, POWDER, LYOPHILIZED, FOR SOLUTION INTRAVENOUS at 09:30

## 2019-11-03 RX ADMIN — ACETAMINOPHEN PRN MG: 325 TABLET ORAL at 06:50

## 2019-11-03 RX ADMIN — VALSARTAN SCH MG: 160 TABLET, FILM COATED ORAL at 09:31

## 2019-11-03 RX ADMIN — FUROSEMIDE SCH MG: 10 INJECTION, SOLUTION INTRAVENOUS at 06:45

## 2019-11-03 RX ADMIN — CYANOCOBALAMIN TAB 1000 MCG SCH MCG: 1000 TAB at 10:21

## 2019-11-03 RX ADMIN — CEFEPIME HYDROCHLORIDE SCH MLS/HR: 1 INJECTION, POWDER, FOR SOLUTION INTRAMUSCULAR; INTRAVENOUS at 09:30

## 2019-11-03 RX ADMIN — FUROSEMIDE SCH MG: 10 INJECTION, SOLUTION INTRAVENOUS at 14:01

## 2019-11-03 RX ADMIN — DOXYCYCLINE SCH MLS/HR: 100 INJECTION, POWDER, LYOPHILIZED, FOR SOLUTION INTRAVENOUS at 21:53

## 2019-11-03 RX ADMIN — AMLODIPINE BESYLATE SCH MG: 2.5 TABLET ORAL at 09:31

## 2019-11-03 NOTE — PN
Physical Exam: 


SUBJECTIVE: Patient seen and examined





OBJECTIVE:


labs show leukopenia and thrombocytopenia





Patient is an 84 year old male with a significant past medical history of 

hypertension, hyperlipidemia, prosthetic aortic valve replacement (bovine). 

afib (on eliquis).   He presented to the [facility] ED with ataxia, chills, rigors and 

weakness.  patient had rigors at home and brought into the ED by his family for 

further evaluation.    He transferred to the ICU closer monitoring.  today's 

labs again noted to have decrease in wbc and platelets. viral panel pending.





 Vital Signs











 Period  Temp  Pulse  Resp  BP Sys/Rosales  Pulse Ox


 


 Last 24 Hr  98.6 F-101.9 F  56-71  16-25  124-212/  97-97





 


GENERAL: The patient is awake, alert, weak appearing, tolerating room air.


HEAD: Normal with no signs of trauma.


EYES: PERRL, extraocular movements intact, sclera anicteric, conjunctiva clear. 

No ptosis. 


ENT: Ears normal, nares patent, oropharynx clear without exudates, moist mucous 

membranes.


NECK: Trachea midline, full range of motion, supple. 


LUNGS: Breath sounds equal, but diminished bilaterally, no wheeze


HEART: Regular rate and rhythm, sinus refugio


ABDOMEN: Soft, nontender, nondistended, obese abdomen


EXTREMITIES:  no edema. 


NEUROLOGICAL:  Normal speech, gait not observed.


PSYCH: Normal mood, normal affect.


SKIN: Warm, dry, normal turgor, no rashes or lesions noted


 








 Laboratory Results - last 24 hr











  11/03/19 11/03/19 11/03/19





  05:45 05:45 05:45


 


WBC   2.2 L 


 


RBC   4.93 


 


Hgb   15.4 


 


Hct   43.6 


 


MCV   88.3 


 


MCH   31.2 


 


MCHC   35.3 


 


RDW   13.5 


 


Plt Count   77 L 


 


MPV   8.3 


 


Absolute Neuts (auto)   1.6 


 


Neutrophils %   74.7 


 


Lymphocytes %   14.0 


 


Monocytes %   8.5 


 


Eosinophils %   2.0  D 


 


Basophils %   0.8 


 


Nucleated RBC %   0 


 


Sodium    136


 


Potassium    3.2 L


 


Chloride    99


 


Carbon Dioxide    30


 


Anion Gap    7 L


 


BUN    26.2 H


 


Creatinine    1.4 H


 


Est GFR (CKD-EPI)AfAm    53.85


 


Est GFR (CKD-EPI)NonAf    46.46


 


Random Glucose    102


 


Calcium    8.6


 


Magnesium    2.2


 


Total Bilirubin    2.2 H


 


AST    82 H


 


ALT    50


 


Alkaline Phosphatase    174 H


 


Total Protein    6.3 L


 


Albumin    3.2 L


 


RPR Titer  Nonreactive  








Active Medications











Generic Name Dose Route Start Last Admin





  Trade Name Freq  PRN Reason Stop Dose Admin


 


Acetaminophen  650 mg  11/01/19 02:18  11/03/19 12:48





  Tylenol -  PO   650 mg





  Q6H PRN   Administration





  FEVER   





     





     





     


 


Amlodipine Besylate  2.5 mg  11/02/19 10:00  11/03/19 09:31





  Norvasc -  PO   2.5 mg





  DAILY LARISA   Administration





     





     





     





     


 


Apixaban  5 mg  11/01/19 10:00  11/03/19 09:31





  Eliquis -  PO   5 mg





  BID LARISA   Administration





     





     





     





     


 


Cyanocobalamin  1,000 mcg  11/01/19 10:00  11/03/19 10:21





  Vitamin B12 -  PO   1,000 mcg





  DAILY LARISA   Administration





     





     





     





     


 


Furosemide  40 mg  11/02/19 06:00  11/03/19 14:01





  Lasix Injection -  IVPUSH   40 mg





  BID@0600,1400 LARISA   Administration





     





     





     





     


 


Cefepime HCl 1 gm/ Dextrose  100 mls @ 200 mls/hr  11/01/19 18:40  11/03/19 09:

30





  IVPB   200 mls/hr





  Q8H-IV LARISA   Administration





     





     





  Protocol   





     


 


Doxycycline Hyclate 100 mg/  100 mls @ 100 mls/hr  11/02/19 15:30  11/03/19 09:

30





  Dextrose  IVPB   100 mls/hr





  BID LARISA   Administration





     





     





     





     


 


Metoprolol Succinate  25 mg  11/01/19 10:00  11/03/19 09:31





  Toprol Xl -  PO   25 mg





  DAILY LARISA   Administration





     





     





     





     


 


Rosuvastatin Calcium  5 mg  11/01/19 22:00  11/02/19 22:33





  Crestor -  PO   5 mg





  HS LARISA   Administration





     





     





     





     


 


Valsartan  160 mg  11/03/19 10:00  11/03/19 09:31





  Diovan -  PO   160 mg





  DAILY LARISA   Administration





     





     





     





     











ASSESSMENT/PLAN:








Problem List





- Problems


(1) Severe sepsis


Assessment/Plan: 


Patient with high grade fevers, chills, malaise, and fatigue/weakness.  now 

noted with leukopenia and thrombocytopenia.


Fever of unknown origin, started on emperic antibiotics pending cultures, 

doxycycline until tick borne disease can be ruled out with viral panel. 


pancultured, lactic acid wnl


started on antibiotics per ID (cefepime), doxycline


blood and urine cultures pending to date, viral panel added.


chest ct shows mediastinal adenopaty with moderate increase since prior exam


CT with gall stones, no acute lb seen, spine CT does not explain source of 

fever


will order abd u/s to better view gallbladder.


discussed with ID


Code(s): A41.9 - SEPSIS, UNSPECIFIED ORGANISM; R65.20 - SEVERE SEPSIS WITHOUT 

SEPTIC SHOCK   





(2) Thrombocytopenia


Assessment/Plan: 


defer using heparin for drop in platelets.  hematology following and notes 

reviewed.


patient was not on any heparin prior to hospitalization and was not on any 

heparin here, therefore, will not order HIT panel.


discussed with his cardiologist, defer heparin gtt and keep on eliquis


Code(s): D69.6 - THROMBOCYTOPENIA, UNSPECIFIED   





(3) Leukopenia


Assessment/Plan: 


unclear etiology, but concern since patient has severe sepsis and now with low 

WBC count.


viral panel ordered


may be due to infection, however, platelets are decreasing also


repeat cbc and monitor


heme consulted


Code(s): D72.819 - DECREASED WHITE BLOOD CELL COUNT, UNSPECIFIED   





(4) DIPTI (obstructive sleep apnea)


Assessment/Plan: 


continue home cpap


Code(s): G47.33 - OBSTRUCTIVE SLEEP APNEA (ADULT) (PEDIATRIC)   





(5) HLD (hyperlipidemia)


Assessment/Plan: 


continue home meds


Code(s): E78.5 - HYPERLIPIDEMIA, UNSPECIFIED   





(6) HTN (hypertension)


Assessment/Plan: 


elevated, on metoprolol. started on norvasc 2.5mg 


Code(s): I10 - ESSENTIAL (PRIMARY) HYPERTENSION   





(7) S/P aortic valve replacement


Assessment/Plan: 


discussed with patient's cardiologist who is concerned for endocarditis as 

source of fever


echo noted.  may need further cardiac workup if fevers persist. 


Code(s): Z95.2 - PRESENCE OF PROSTHETIC HEART VALVE   





(8) Afib


Assessment/Plan: 


on eliquis 5 bid and metoprol


Code(s): I48.91 - UNSPECIFIED ATRIAL FIBRILLATION   





(9) Prophylactic measure


Assessment/Plan: 


fen


tolerating po


monitor electrolyles


low salt diet as tolerated





on eliquis





full code





Code(s): Z29.9 - ENCOUNTER FOR PROPHYLACTIC MEASURES, UNSPECIFIED   





Visit type





- Emergency Visit


Emergency Visit: Yes


ED Registration Date: 11/01/19


Care time: The patient presented to the Emergency Department on the above date 

and was hospitalized for further evaluation of their emergent condition.





- New Patient


This patient is new to me today: No





- Critical Care


Critical Care patient: Yes


Total Critical Care Time (in minutes): 45


Critical Care Statement: The care of this patient involved high complexity 

decision making to prevent further life threatening deterioration of the patient

's condition and/or to evaluate & treat vital organ system(s) failure or risk 

of failure.

## 2019-11-03 NOTE — PN
Progress Note (short form)





- Note


Progress Note: 





Pt seen and examined.





S: Feels better.  No chills at the time of interview and mentioned not chills 

today.





O:





 Last Vital Signs











Temp Pulse Resp BP Pulse Ox


 


 101.4 F H  66   16   164/79   97 


 


 11/03/19 14:00  11/03/19 14:00  11/03/19 14:00  11/03/19 14:00  11/03/19 10:00











General: NAD


HEENT: MMM


CVS: S1, S2


Lungs: CTAB/Ant


Abdomen: Soft, NT, ND


Extremities: No edema


Skin: No petechiae or bruising





 Current Medications











Generic Name Dose Route Start Last Admin





  Trade Name Freq  PRN Reason Stop Dose Admin


 


Acetaminophen  650 mg  11/01/19 02:18  11/03/19 12:48





  Tylenol -  PO   650 mg





  Q6H PRN   Administration





  FEVER   





     





     





     


 


Amlodipine Besylate  5 mg  11/03/19 16:40  





  Norvasc -  PO   





  DAILY LARISA   





     





     





     





     


 


Apixaban  5 mg  11/01/19 10:00  11/03/19 09:31





  Eliquis -  PO   5 mg





  BID LARISA   Administration





     





     





     





     


 


Cyanocobalamin  1,000 mcg  11/01/19 10:00  11/03/19 10:21





  Vitamin B12 -  PO   1,000 mcg





  DAILY LARISA   Administration





     





     





     





     


 


Furosemide  40 mg  11/02/19 06:00  11/03/19 14:01





  Lasix Injection -  IVPUSH   40 mg





  BID@0600,1400 LARISA   Administration





     





     





     





     


 


Cefepime HCl 1 gm/ Dextrose  100 mls @ 200 mls/hr  11/01/19 18:40  11/03/19 09:

30





  IVPB   200 mls/hr





  Q8H-IV LARISA   Administration





     





     





  Protocol   





     


 


Doxycycline Hyclate 100 mg/  100 mls @ 100 mls/hr  11/02/19 15:30  11/03/19 09:

30





  Dextrose  IVPB   100 mls/hr





  BID LARIAS   Administration





     





     





     





     


 


Metoprolol Succinate  25 mg  11/01/19 10:00  11/03/19 09:31





  Toprol Xl -  PO   25 mg





  DAILY LARISA   Administration





     





     





     





     


 


Rosuvastatin Calcium  5 mg  11/01/19 22:00  11/02/19 22:33





  Crestor -  PO   5 mg





  HS LARISA   Administration





     





     





     





     


 


Valsartan  160 mg  11/03/19 10:00  11/03/19 09:31





  Diovan -  PO   160 mg





  DAILY LARISA   Administration





     





     





     





     








 





 11/03/19 05:45 





 11/03/19 05:45 

















83 y/o gentleman with a PMHx of HTN, HLD, Prosthetic Aortic Valve ( Bovine) 

admitted to the MICU for sepsis and hematology called due to leukopenia and 

thrombocytopenia.





Recommend:





1) Thrombocytopenia and Leukopenia: Due to sepsis. Pt still febrile with chills 

and symptoms of systemic infection. Hemodynamically stable. ID work-up pending. 

However (although low probability) will need to rule out HLH. Please order: LDH

, Ferritin, PT/INR, PTT, Fibrinogen, Serum triglycerides, Soluble CD25. Will 

closely monitor to determine need for bone marrow examination.


2) No heparin ever ordered per primary team so no HIT work-up indicated.


3) Antibiotic therapy per ID. Multiple cultures and tests pending. R/O 

Endocarditis. Infectious cause for bicytopenia (or drug related) cannot be 

excluded.


4) Thank you very much for this consultation.








Problem List





- Problems


(1) Leukopenia


Code(s): D72.819 - DECREASED WHITE BLOOD CELL COUNT, UNSPECIFIED   





(2) Severe sepsis


Code(s): A41.9 - SEPSIS, UNSPECIFIED ORGANISM; R65.20 - SEVERE SEPSIS WITHOUT 

SEPTIC SHOCK   





(3) Thrombocytopenia


Code(s): D69.6 - THROMBOCYTOPENIA, UNSPECIFIED

## 2019-11-03 NOTE — PN
Progress Note (short form)





- Note


Progress Note: 





PULM/CCM Progress Note:





Pt seen and examined in the ICU. Awake and oriented. No c/o. HD stable. Remains 

with low grade fever. 


 





Active Medications





Acetaminophen (Tylenol -)  650 mg PO Q6H PRN


   PRN Reason: FEVER


   Last Admin: 11/03/19 06:50 Dose:  650 mg


Amlodipine Besylate (Norvasc -)  2.5 mg PO DAILY [facility]


   Last Admin: 11/03/19 09:31 Dose:  2.5 mg


Apixaban (Eliquis -)  5 mg PO BID [facility]


   Last Admin: 11/03/19 09:31 Dose:  5 mg


Cyanocobalamin (Vitamin B12 -)  1,000 mcg PO DAILY [facility]


   Last Admin: 11/03/19 10:21 Dose:  1,000 mcg


Furosemide (Lasix Injection -)  40 mg IVPUSH BID@0600,1400 [facility]


   Last Admin: 11/03/19 06:45 Dose:  40 mg


Cefepime HCl 1 gm/ Dextrose  100 mls @ 200 mls/hr IVPB Q8H-IV [facility]; Protocol


   Last Admin: 11/03/19 09:30 Dose:  200 mls/hr


Doxycycline Hyclate 100 mg/ (Dextrose)  100 mls @ 100 mls/hr IVPB BID [facility]


   Last Admin: 11/03/19 09:30 Dose:  100 mls/hr


Metoprolol Succinate (Toprol Xl -)  25 mg PO DAILY [facility]


   Last Admin: 11/03/19 09:31 Dose:  25 mg


Rosuvastatin Calcium (Crestor -)  5 mg PO HS [facility]


   Last Admin: 11/02/19 22:33 Dose:  5 mg


Valsartan (Diovan -)  160 mg PO DAILY [facility]


   Last Admin: 11/03/19 09:31 Dose:  160 mg





 Vital Signs











Temperature  100 F H  11/03/19 08:00


 


Pulse Rate  71   11/03/19 08:00


 


Respiratory Rate  18   11/03/19 08:00


 


Blood Pressure  136/69   11/03/19 08:00


 


O2 Sat by Pulse Oximetry (%)  97   11/03/19 08:38











  Intake & Output











 10/31/19 11/01/19 11/02/19 11/03/19





 23:59 23:59 23:59 22:59


 


Intake Total 1000 50 1320 100


 


Output Total  1100 475 


 


Balance 1000 -1050 845 100


 


Weight 95.254 kg 97.069 kg 94.665 kg 94.801 kg

















EXAM:


neuro: Awake, oriented x3


HEENT: PERRL, anicteric sclera


Lungs: clear


Heart: AF, rate controlled


Abd: Soft, non-tender, obese


Ext: no edema, warm


Skin: warm, no signs of abscess or cellulitis  





 CBC,CMP











WBC  2.2 K/mm3 (4.0-10.0)  L  11/03/19  05:45    


 


RBC  4.93 M/mm3 (4.00-5.60)   11/03/19  05:45    


 


Hgb  15.4 GM/dL (11.7-16.9)   11/03/19  05:45    


 


Hct  43.6 % (35.4-49)   11/03/19  05:45    


 


MCV  88.3 fl (80-96)   11/03/19  05:45    


 


MCH  31.2 pg (25.7-33.7)   11/03/19  05:45    


 


MCHC  35.3 g/dl (32.0-35.9)   11/03/19  05:45    


 


RDW  13.5 % (11.9-15.9)   11/03/19  05:45    


 


Plt Count  77 K/MM3 (134-434)  L  11/03/19  05:45    


 


MPV  8.3 fl (7.5-11.1)   11/03/19  05:45    


 


Absolute Neuts (auto)  1.6 K/mm3 (1.5-8.0)   11/03/19  05:45    


 


Neutrophils %  74.7 % (42.8-82.8)   11/03/19  05:45    


 


Lymphocytes %  14.0 % (8-40)   11/03/19  05:45    


 


Monocytes %  8.5 % (3.8-10.2)   11/03/19  05:45    


 


Eosinophils %  2.0 % (0-4.5)  D 11/03/19  05:45    


 


Basophils %  0.8 % (0-2.0)   11/03/19  05:45    


 


Nucleated RBC %  0 % (0-0)   11/03/19  05:45    


 


Sodium  136 mmol/L (136-145)   11/03/19  05:45    


 


Potassium  3.2 mmol/L (3.5-5.1)  L  11/03/19  05:45    


 


Chloride  99 mmol/L ()   11/03/19  05:45    


 


Carbon Dioxide  30 mmol/L (21-32)   11/03/19  05:45    


 


Anion Gap  7 MMOL/L (8-16)  L  11/03/19  05:45    


 


BUN  26.2 mg/dL (7-18)  H  11/03/19  05:45    


 


Creatinine  1.4 mg/dL (0.55-1.3)  H  11/03/19  05:45    


 


Est GFR (CKD-EPI)AfAm  53.85   11/03/19  05:45    


 


Est GFR (CKD-EPI)NonAf  46.46   11/03/19  05:45    


 


POC Glucometer  102 UNITS ()   11/01/19  15:52    


 


Random Glucose  102 mg/dL ()   11/03/19  05:45    


 


Hemoglobin A1c %  5.5 % (4.2-6.3)   11/01/19  06:30    


 


Lactic Acid  1.3 mmol/L (0.4-2.0)   10/31/19  20:00    


 


Calcium  8.6 mg/dL (8.5-10.1)   11/03/19  05:45    


 


Phosphorus  3.2 mg/dL (2.5-4.9)   11/02/19  05:51    


 


Magnesium  2.2 mg/dL (1.8-2.4)   11/03/19  05:45    


 


Total Bilirubin  2.2 mg/dL (0.2-1)  H  11/03/19  05:45    


 


AST  82 U/L (15-37)  H  11/03/19  05:45    


 


ALT  50 U/L (13-61)   11/03/19  05:45    


 


Alkaline Phosphatase  174 U/L ()  H  11/03/19  05:45    


 


Creatine Kinase  278 U/L ()   11/02/19  05:51    


 


Creatine Kinase Index  1.0 % (0.0-5.0)   11/02/19  05:51    


 


CK-MB (CK-2)  2.87 ng/mL (0.5-3.6)   11/02/19  05:51    


 


Troponin I  0.05 ng/ml (0.00-0.05)   11/01/19  13:30    


 


B-Natriuretic Peptide  9668.7 pg/ml (5-450)  H  11/01/19  06:30    


 


Total Protein  6.3 g/dl (6.4-8.2)  L  11/03/19  05:45    


 


Albumin  3.2 g/dl (3.4-5.0)  L  11/03/19  05:45    


 


Triglycerides  47 mg/dl (0-150)   11/01/19  06:30    


 


Cholesterol  99 mg/dl ()   11/01/19  06:30    


 


Total LDL Cholesterol  55 mg/dl (5-100)   11/01/19  06:30    


 


HDL Cholesterol  35 mg/dl (40-60)  L  11/01/19  06:30    


 


Vitamin B12  812 pg/ml (193-986)   11/02/19  05:51    


 


TSH  0.80 uIU/ml (0.358-3.74)  D 11/02/19  05:51    

















ASSESSMENT:


Fever of unknown origin with altered mental status


Sepsis


Leukopenia


AF on chronic AC


AS s/p porcine valve 2009


HTN


CHF


DIPTI


COPD


CKD





PLAN:


-Abx per ID


-Follow up all cultures and imaging - may need contrast study or MRI


-Continue Lasix


-Nebs PRN


-CPAP at night and when sleeping


-Oxygen to maintain SpO2 >92


-Rate control


-Eliquis would need to be held for at least 24hrs prior to LP - if required; 

can bridge with Heparin drip and d/c 4hrs prior to LP and check PTT


-GI PPx while on AC








Helga Viramontes, JUANP


Pulm/Critical Care NP











Additional CC's:


Gregorio Becerra

## 2019-11-03 NOTE — PN
Progress Note, Physician


History of Present Illness: 





continues to spike fevers


breathing better


all tests pending





- Current Medication List


Current Medications: 


Active Medications





Acetaminophen (Tylenol -)  650 mg PO Q6H PRN


   PRN Reason: FEVER


   Last Admin: 11/03/19 06:50 Dose:  650 mg


Amlodipine Besylate (Norvasc -)  2.5 mg PO DAILY [facility]


   Last Admin: 11/03/19 09:31 Dose:  2.5 mg


Apixaban (Eliquis -)  5 mg PO BID [facility]


   Last Admin: 11/03/19 09:31 Dose:  5 mg


Cyanocobalamin (Vitamin B12 -)  1,000 mcg PO DAILY [facility]


   Last Admin: 11/03/19 10:21 Dose:  1,000 mcg


Furosemide (Lasix Injection -)  40 mg IVPUSH BID@0600,1400 [facility]


   Last Admin: 11/03/19 06:45 Dose:  40 mg


Cefepime HCl 1 gm/ Dextrose  100 mls @ 200 mls/hr IVPB Q8H-IV [facility]; Protocol


   Last Admin: 11/03/19 09:30 Dose:  200 mls/hr


Doxycycline Hyclate 100 mg/ (Dextrose)  100 mls @ 100 mls/hr IVPB BID [facility]


   Last Admin: 11/03/19 09:30 Dose:  100 mls/hr


Metoprolol Succinate (Toprol Xl -)  25 mg PO DAILY [facility]


   Last Admin: 11/03/19 09:31 Dose:  25 mg


Rosuvastatin Calcium (Crestor -)  5 mg PO HS [facility]


   Last Admin: 11/02/19 22:33 Dose:  5 mg


Valsartan (Diovan -)  160 mg PO DAILY [facility]


   Last Admin: 11/03/19 09:31 Dose:  160 mg











- Objective


Vital Signs: 


 Vital Signs











Temperature  100 F H  11/03/19 08:00


 


Pulse Rate  61   11/03/19 10:00


 


Respiratory Rate  16   11/03/19 10:00


 


Blood Pressure  130/60   11/03/19 10:00


 


O2 Sat by Pulse Oximetry (%)  97   11/03/19 10:00











Constitutional: Yes: No Distress, Calm


Cardiovascular: Yes: S1, S2


Respiratory: Yes: Regular, CTA Bilaterally


Gastrointestinal: Yes: Normal Bowel Sounds, Soft


Musculoskeletal: Yes: WNL


Extremities: Yes: WNL


Neurological: Yes: Alert, Oriented


Psychiatric: Yes: Alert, Oriented


Labs: 


 CBC, BMP





 11/03/19 05:45 





 11/03/19 05:45 











Assessment/Plan








Problem List





- Problems


(1) Severe sepsis


Code(s): A41.9 - SEPSIS, UNSPECIFIED ORGANISM; R65.20 - SEVERE SEPSIS WITHOUT 

SEPTIC SHOCK   





(2) DIPTI (obstructive sleep apnea)


Code(s): G47.33 - OBSTRUCTIVE SLEEP APNEA (ADULT) (PEDIATRIC)   





(3) HLD (hyperlipidemia)


Code(s): E78.5 - HYPERLIPIDEMIA, UNSPECIFIED   





(4) HTN (hypertension)


Code(s): I10 - ESSENTIAL (PRIMARY) HYPERTENSION   





(5) S/P aortic valve replacement 


Code(s): Z95.2 - PRESENCE OF PROSTHETIC HEART VALVE   





(6) Afib


Code(s): I48.91 - UNSPECIFIED ATRIAL FIBRILLATION   





plan


continue current mgmt


await for results


rest as per the team


patient still spiking fevers


will order a stat blood parasite exam








cc 45 min

## 2019-11-04 LAB
ALBUMIN SERPL-MCNC: 2.8 G/DL (ref 3.4–5)
ALP SERPL-CCNC: 404 U/L (ref 45–117)
ALT SERPL-CCNC: 96 U/L (ref 13–61)
ANION GAP SERPL CALC-SCNC: 6 MMOL/L (ref 8–16)
ANISOCYTOSIS BLD QL: 0
APTT BLD: 40.5 SECONDS (ref 25.2–36.5)
AST SERPL-CCNC: 183 U/L (ref 15–37)
B MICROTI IGG SER QL: (no result)
B MICROTI IGM SER-ACNC: (no result)
BASOPHILS # BLD: 0.5 % (ref 0–2)
BASOPHILS # BLD: 0.8 % (ref 0–2)
BILIRUB DIRECT SERPL-MCNC: 398 U/L (ref 87–246)
BILIRUB SERPL-MCNC: 2.7 MG/DL (ref 0.2–1)
BUN SERPL-MCNC: 30.4 MG/DL (ref 7–18)
CALCIUM SERPL-MCNC: 8.8 MG/DL (ref 8.5–10.1)
CHLORIDE SERPL-SCNC: 100 MMOL/L (ref 98–107)
CO2 SERPL-SCNC: 30 MMOL/L (ref 21–32)
CREAT SERPL-MCNC: 1.4 MG/DL (ref 0.55–1.3)
DEPRECATED RDW RBC AUTO: 13.6 % (ref 11.9–15.9)
DEPRECATED RDW RBC AUTO: 13.9 % (ref 11.9–15.9)
EOSINOPHIL # BLD: 1.3 % (ref 0–4.5)
EOSINOPHIL # BLD: 1.5 % (ref 0–4.5)
GLUCOSE SERPL-MCNC: 113 MG/DL (ref 74–106)
HCT VFR BLD CALC: 41.7 % (ref 35.4–49)
HCT VFR BLD CALC: 44.7 % (ref 35.4–49)
HGB BLD-MCNC: 14.1 GM/DL (ref 11.7–16.9)
HGB BLD-MCNC: 14.7 GM/DL (ref 11.7–16.9)
INR BLD: 1.75 (ref 0.83–1.09)
IRON SERPL-MCNC: 32 UG/DL (ref 50–175)
IRON SERPL-MCNC: 34 UG/DL (ref 50–175)
LYMPHOCYTES # BLD: 26.1 % (ref 8–40)
LYMPHOCYTES # BLD: 28 % (ref 8–40)
MACROCYTES BLD QL: 0
MAGNESIUM SERPL-MCNC: 2.2 MG/DL (ref 1.8–2.4)
MCH RBC QN AUTO: 29.3 PG (ref 25.7–33.7)
MCH RBC QN AUTO: 29.6 PG (ref 25.7–33.7)
MCHC RBC AUTO-ENTMCNC: 33 G/DL (ref 32–35.9)
MCHC RBC AUTO-ENTMCNC: 33.7 G/DL (ref 32–35.9)
MCV RBC: 87.6 FL (ref 80–96)
MCV RBC: 88.7 FL (ref 80–96)
MONOCYTES # BLD AUTO: 7.1 % (ref 3.8–10.2)
MONOCYTES # BLD AUTO: 7.6 % (ref 3.8–10.2)
NEUTROPHILS # BLD: 62.6 % (ref 42.8–82.8)
NEUTROPHILS # BLD: 64.5 % (ref 42.8–82.8)
PLATELET # BLD AUTO: 61 K/MM3 (ref 134–434)
PLATELET # BLD AUTO: 61 K/MM3 (ref 134–434)
PLATELET BLD QL SMEAR: (no result)
PLATELET BLD QL SMEAR: (no result)
PMV BLD: 9.1 FL (ref 7.5–11.1)
PMV BLD: 9.6 FL (ref 7.5–11.1)
POTASSIUM SERPLBLD-SCNC: 3.2 MMOL/L (ref 3.5–5.1)
PROT SERPL-MCNC: 5.9 G/DL (ref 6.4–8.2)
PT PNL PPP: 20.8 SEC (ref 9.7–13)
RBC # BLD AUTO: 4.76 M/MM3 (ref 4–5.6)
RBC # BLD AUTO: 5.04 M/MM3 (ref 4–5.6)
SODIUM SERPL-SCNC: 136 MMOL/L (ref 136–145)
TIBC SERPL-MCNC: 232 UG/DL (ref 250–450)
TRIGL SERPL-MCNC: 135 MG/DL (ref 0–150)
WBC # BLD AUTO: 1.8 K/MM3 (ref 4–10)
WBC # BLD AUTO: 2.4 K/MM3 (ref 4–10)

## 2019-11-04 RX ADMIN — ACETAMINOPHEN PRN MG: 325 TABLET ORAL at 01:33

## 2019-11-04 RX ADMIN — DOXYCYCLINE SCH MLS/HR: 100 INJECTION, POWDER, LYOPHILIZED, FOR SOLUTION INTRAVENOUS at 09:45

## 2019-11-04 RX ADMIN — CEFEPIME HYDROCHLORIDE SCH MLS/HR: 1 INJECTION, POWDER, FOR SOLUTION INTRAMUSCULAR; INTRAVENOUS at 09:50

## 2019-11-04 RX ADMIN — CEFEPIME HYDROCHLORIDE SCH MLS/HR: 1 INJECTION, POWDER, FOR SOLUTION INTRAMUSCULAR; INTRAVENOUS at 01:33

## 2019-11-04 RX ADMIN — CEFEPIME HYDROCHLORIDE SCH MLS/HR: 1 INJECTION, POWDER, FOR SOLUTION INTRAMUSCULAR; INTRAVENOUS at 17:43

## 2019-11-04 RX ADMIN — DOXYCYCLINE SCH MLS/HR: 100 INJECTION, POWDER, LYOPHILIZED, FOR SOLUTION INTRAVENOUS at 22:52

## 2019-11-04 RX ADMIN — FUROSEMIDE SCH MG: 10 INJECTION, SOLUTION INTRAVENOUS at 13:47

## 2019-11-04 RX ADMIN — ACETAMINOPHEN PRN MG: 325 TABLET ORAL at 13:47

## 2019-11-04 RX ADMIN — CYANOCOBALAMIN TAB 1000 MCG SCH MCG: 1000 TAB at 13:48

## 2019-11-04 RX ADMIN — ROSUVASTATIN CALCIUM SCH MG: 5 TABLET, FILM COATED ORAL at 22:51

## 2019-11-04 RX ADMIN — VALSARTAN SCH MG: 160 TABLET, FILM COATED ORAL at 09:38

## 2019-11-04 RX ADMIN — FUROSEMIDE SCH MG: 10 INJECTION, SOLUTION INTRAVENOUS at 06:51

## 2019-11-04 NOTE — CONSULT
Admitting History and Physical





- Past Medical History


Cardiovascular: Yes: Aortic Insufficiency, HTN, Hyperlipdemia


Renal/: Yes: Cancer (prostate (seed implant))


Infectious Disease: Yes: MRSA (perianal abcess)





- Past Surgical History


Past Surgical History: Yes: Valve Replacement (aortic (on Eliquis))





- Smoking History


Smoking history: Former smoker


Have you smoked in the past 12 months: No


Aproximately how many cigarettes per day: 0


If you are a former smoker, when did you quit?: years ago





- Alcohol/Substance Use


Hx Alcohol Use: No


History of Substance Use: reports: None





- Social History


ADL: Independent


History of Recent Travel: No





History





- Admission


Reason For Visit: SHAKED/FEVER/WEAKNESS





- Hearing


Hearing: Normal


Hearing Aide: No





Speech Evaluation





- Communication


Primary Language: ENGLISH


Communication: Yes: Within Normal Limits


Oral Expression Ability: Yes: No Impairment





- Speech Production


Apraxia: No


Able to Make Needs Known: Yes: WNL


Intelligibility: Yes: WNL





- Speech Characteristics


Voice Loudness: Normal


Voice Pitch: Yes: Normal


Voice Phonatory-based Quality: Yes: Normal


Speech Pattern: Normal


Nasal Resonance: Normal


Rate of Speech: Intact


Voice Comment: Vocal quality is functional for the environment with speech 

parameters WNL.





- Language/Auditory Comprehension


Follows: Yes: 1 Stage Simple Commands (WFL), Complex Commands (WFL)


Observation: Able to respond to yes/no queries: Yes, Yes/No Confusion: No, 

Comprehends Conversational Speech: Yes, Benefits from Slow Speech: No, Benefits 

from Repetiton: No, Benefits from Increased Volume of Speech: No





- Language/Verbal Expression


Able to Respond to Simple Queries: Yes: WNL


Able to Communicate Wants and Needs: Yes: WNL


Functional Communication Status: Yes: WNL


Aware of Errors: Yes


Attempts to Correct Errors: Yes


Use of Gestures: No


Written Expression: Not examined


Oral Expression: WFL


Reading Comprehension: Not examined


Calculations: Not examined


Attention: Yes: Intact





- Memory/Perception


Long term Memory: Yes: WNL


Short Term Memory: Yes: WNL





- Swallow Evaluation/Bedside Assessment


Current Nutritional Intake: Regular, Thin Liquids


Oral Secretions: Yes: WFL


Tracheostomy Present: No


Patient on Ventilator: No


Dentition: Yes: Adequate


Facial Symmetry at Rest: Facial Droop Right


Facial Symmetry on Retraction: Facial Droop Right


Facial Movement: Controlled


Sensation: Normal


Facial Comment: Oral and facial features are within functional limits for 

speech and swallo


Jaw Position: Closed at Rest


Against Resistance Opening: Normal


Against Resistance Closing: Normal


Pucker Lips: Normal


Smile: Normal


Lips, Comment: Oral and facial features are within functional limits for speech 

and swallo


Lingual Movement: Normal


Lingual Speed of Movement: Normal


Lingual Movement Strgth Against Opposition: Normal


Lingual Movement Characteristics: Normal


Lingual Comment: Oral and facial features are within functional limits for 

speech and swallo


Soft Palate Description: Normal Color


Hard Palate Description: Normal Color


Gag Reflex: Strong


Laryngeal Elevation: WFL


Laryngeal Movement: Able to Palpate


Needs Assistance: No


Rate of Intake: WFL


Bolus Size: WFL


Chewing: WFL


Oral Prep Time: WFL


A-P Transit: WFL


Timing of Swallow: WFL


Coughing/Throat Clear: No


Change in Voice: No


Other Findings/Remarks: 


83 yo male seen at bedside for swallow eval to r/o dysphagia.  Pt is verbal, A&

Ox3, cooperative.  Admitted for chills and weakness.  Current diet:  regular 

solids with thin liquids.





Pt given PO trials of pureed, regular cut to bite sized pieces without 

assistance revealed, adequate bolus formation and A  P transport with a timely 

pharyngeal swallow (1-2 second average).  No change in voicing or respiration 

after the swallow. 





Thin liquids trials were Unremarkable for dysphagia and /or aspiration at this 

time.  





Recommendations





- Speech Evaluation, Impression/Plan


Impression: 83 yo male presents with no overt s/s of dysphagia and /or 

aspiration for purees, regular solids and thin liquids at this time.  Speech 

and Language and WNL


Long Term Goals: Tolerate the least restrictive solid and liquid consistencies 

without s/s of penetration / aspiration.


Short Term Goals: Pt is able to consume regular solids with thin liquids at 

bedside without s/s of penetration and /or aspiration at this time.





- Dysphagia Impressions/Plan


Swallowing Skills: WFL


Dysphagia Impressions: No Impairment


Dysphagia Treatment Plan: OOB for meals, OOB for 1 h. after meals, Other (

Monitor intake and pulmonary status)


Dysphagia Evaluation Summary: The oral and pharyngeal swallow is adequate for 

po intake of purees, and regular solids with thin liquids. Labial containment, 

mastication, bolus transport, and initiation of swallow were WNL.  No coughing 

or changes in voicing to suggest penetration / aspiration at bedside at this 

time.





- Recommendations


Diet Consistency: Regular


Liquids: Thin Liquids

## 2019-11-04 NOTE — PN
Progress Note (short form)





- Note


Progress Note: 





Mr. Campos was admitted with chills or rigors, cough and dyspnea  Known case 

of hypertension, hypertensive cardiovascular disease, severe left ventricular 

diastolic dysfunction, paroxysmal atrial fibrillation, chronic pulmonary disease

, status post  bioprosthetic aortic valve replacement.





He was afebrile, no dyspnea was reported still has some residual cough.no chest 

pain or discomfort.  No history of palpitations, lightheadedness, dizziness.


Active Medications











Generic Name Dose Route Start Last Admin





  Trade Name Freq  PRN Reason Stop Dose Admin


 


Acetaminophen  650 mg  11/01/19 02:18  11/04/19 13:47





  Tylenol -  PO   650 mg





  Q6H PRN   Administration





  FEVER   





     





     





     


 


Amlodipine Besylate  5 mg  11/03/19 16:40  11/04/19 09:49





  Norvasc -  PO   5 mg





  DAILY [facility]   Administration





     





     





     





     


 


Apixaban  5 mg  11/01/19 10:00  11/03/19 21:52





  Eliquis -  PO   5 mg





  BID [facility]   Administration





     





     





     





     


 


Cyanocobalamin  1,000 mcg  11/01/19 10:00  11/04/19 13:48





  Vitamin B12 -  PO   1,000 mcg





  DAILY [facility]   Administration





     





     





     





     


 


Furosemide  40 mg  11/02/19 06:00  11/04/19 13:47





  Lasix Injection -  IVPUSH   40 mg





  BID@0600,1400 [facility]   Administration





     





     





     





     


 


Cefepime HCl 1 gm/ Dextrose  100 mls @ 200 mls/hr  11/01/19 18:40  11/04/19 17:

43





  IVPB   200 mls/hr





  Q8H-IV [facility]   Administration





     





     





  Protocol   





     


 


Doxycycline Hyclate 100 mg/  100 mls @ 100 mls/hr  11/02/19 15:30  11/04/19 09:

45





  Dextrose  IVPB   100 mls/hr





  BID [facility]   Administration





     





     





     





     


 


Metoprolol Succinate  25 mg  11/01/19 10:00  11/04/19 09:50





  Toprol Xl -  PO   25 mg





  DAILY [facility]   Administration





     





     





     





     


 


Rosuvastatin Calcium  5 mg  11/01/19 22:00  11/03/19 21:52





  Crestor -  PO   5 mg





  HS [facility]   Administration





     





     





     





     


 


Valsartan  160 mg  11/03/19 10:00  11/04/19 09:38





  Diovan -  PO   160 mg





  DAILY [facility]   Administration





     





     





     





     








84-year-old gentleman was in no acute distress, no pallor, cyanosis, clubbing 

or jaundice.


 Last Vital Signs











Temp Pulse Resp BP Pulse Ox


 


 97.8 F   59 L  20   143/92   96 


 


 11/04/19 16:48  11/04/19 18:00  11/04/19 18:00  11/04/19 18:00  11/04/19 09:00








NECK: Supple, no jugular venous distention, hepatojugular reflux was negative, 

carotids were 2+, unable to appreciate any bruits or thyromegaly.


HEART: PMI was not localized, no heaves or thrills, heart sounds were distant, 

ejection systolic murmur grade 2/6 was heard at the second right intercostal 

space ending in early to mid systole.  No diastolic murmur or gallops were 

heard.


LUNGS: decreased breath sounds at both bases.  No extraneous sounds were heard.


ABDOMEN: Soft, protuberant, nontender.  No pedal splenomegaly or palpable 

masses were felt.


EXTREMITIES: No calf tenderness or dependent edema, pulses were equal except 

posterior tibial pulses were not palpable.





 Laboratory Results - last 24 hr











  11/02/19 11/02/19 11/04/19





  15:10 15:10 05:50


 


WBC    1.8 L*


 


RBC    4.76


 


Hgb    14.1


 


Hct    41.7


 


MCV    87.6


 


MCH    29.6


 


MCHC    33.7


 


RDW    13.6


 


Plt Count    61 L D


 


MPV    9.1


 


Absolute Neuts (auto)    1.1 L


 


Neutrophils %    62.6


 


Neutrophils % (Manual)    58.1


 


Band Neutrophils %    3.8


 


Lymphocytes %    28.0  D


 


Lymphocytes % (Manual)    8.6


 


Monocytes %    7.1


 


Monocytes % (Manual)    6


 


Eosinophils %    1.5


 


Eosinophils % (Manual)    1.9


 


Basophils %    0.8


 


Basophils % (Manual)    1.9


 


Myelocytes % (Man)    0


 


Promyelocytes % (Man)    0


 


Blast Cells % (Manual)    0


 


Nucleated RBC %    0


 


Metamyelocytes    0


 


Hypochromia    0


 


Platelet Estimate    Decreased


 


Polychromasia    1+


 


Poikilocytosis    0


 


Anisocytosis    0


 


Microcytosis    1+


 


Macrocytosis    0


 


PT with INR   


 


INR   


 


PTT (Actin FS)   


 


Sodium   


 


Potassium   


 


Chloride   


 


Carbon Dioxide   


 


Anion Gap   


 


BUN   


 


Creatinine   


 


Est GFR (CKD-EPI)AfAm   


 


Est GFR (CKD-EPI)NonAf   


 


Random Glucose   


 


Calcium   


 


Magnesium   


 


Iron   


 


TIBC   


 


Iron Saturation   


 


Unsaturated IBC   


 


Ferritin   


 


Total Bilirubin   


 


AST   


 


ALT   


 


Alkaline Phosphatase   


 


LD Total   


 


Total Protein   


 


Albumin   


 


Triglycerides   


 


Babesia microti IgG Ab  <1:10  


 


Babesia microti IgM Ab  <1:10  


 


Lyme Screen IgG & IgM  <0.91  


 


West Nile Virus IgG Ab   Negative 


 


West Nile Virus IgM Ab   Negative 














  11/04/19 11/04/19 11/04/19





  05:50 05:50 05:50


 


WBC   


 


RBC   


 


Hgb   


 


Hct   


 


MCV   


 


MCH   


 


MCHC   


 


RDW   


 


Plt Count   


 


MPV   


 


Absolute Neuts (auto)   


 


Neutrophils %   


 


Neutrophils % (Manual)   


 


Band Neutrophils %   


 


Lymphocytes %   


 


Lymphocytes % (Manual)   


 


Monocytes %   


 


Monocytes % (Manual)   


 


Eosinophils %   


 


Eosinophils % (Manual)   


 


Basophils %   


 


Basophils % (Manual)   


 


Myelocytes % (Man)   


 


Promyelocytes % (Man)   


 


Blast Cells % (Manual)   


 


Nucleated RBC %   


 


Metamyelocytes   


 


Hypochromia   


 


Platelet Estimate   


 


Polychromasia   


 


Poikilocytosis   


 


Anisocytosis   


 


Microcytosis   


 


Macrocytosis   


 


PT with INR   20.80 H 


 


INR   1.75 H 


 


PTT (Actin FS)   40.5 H 


 


Sodium  136  


 


Potassium  3.2 L  


 


Chloride  100  


 


Carbon Dioxide  30  


 


Anion Gap  6 L  


 


BUN  30.4 H  


 


Creatinine  1.4 H  


 


Est GFR (CKD-EPI)AfAm  53.85  


 


Est GFR (CKD-EPI)NonAf  46.46  


 


Random Glucose  113 H  


 


Calcium  8.8  


 


Magnesium  2.2  


 


Iron  32 L   34 L


 


TIBC    232 L


 


Iron Saturation    14 L


 


Unsaturated IBC    198 L


 


Ferritin    1871.8 H


 


Total Bilirubin  2.7 H  


 


AST  183 H  


 


ALT  96 H  


 


Alkaline Phosphatase  404 H  


 


LD Total  398 H  


 


Total Protein  5.9 L  


 


Albumin  2.8 L  


 


Triglycerides  135  


 


Babesia microti IgG Ab   


 


Babesia microti IgM Ab   


 


Lyme Screen IgG & IgM   


 


West Nile Virus IgG Ab   


 


West Nile Virus IgM Ab   














  11/04/19





  18:00


 


WBC  2.4 L


 


RBC  5.04


 


Hgb  14.7


 


Hct  44.7


 


MCV  88.7


 


MCH  29.3


 


MCHC  33.0


 


RDW  13.9


 


Plt Count  61 L


 


MPV  9.6


 


Absolute Neuts (auto)  1.5


 


Neutrophils %  64.5


 


Neutrophils % (Manual) 


 


Band Neutrophils % 


 


Lymphocytes %  26.1


 


Lymphocytes % (Manual) 


 


Monocytes %  7.6


 


Monocytes % (Manual) 


 


Eosinophils %  1.3


 


Eosinophils % (Manual) 


 


Basophils %  0.5


 


Basophils % (Manual) 


 


Myelocytes % (Man) 


 


Promyelocytes % (Man) 


 


Blast Cells % (Manual) 


 


Nucleated RBC %  0


 


Metamyelocytes 


 


Hypochromia 


 


Platelet Estimate  Decreased


 


Polychromasia 


 


Poikilocytosis 


 


Anisocytosis 


 


Microcytosis 


 


Macrocytosis 


 


PT with INR 


 


INR 


 


PTT (Actin FS) 


 


Sodium 


 


Potassium 


 


Chloride 


 


Carbon Dioxide 


 


Anion Gap 


 


BUN 


 


Creatinine 


 


Est GFR (CKD-EPI)AfAm 


 


Est GFR (CKD-EPI)NonAf 


 


Random Glucose 


 


Calcium 


 


Magnesium 


 


Iron 


 


TIBC 


 


Iron Saturation 


 


Unsaturated IBC 


 


Ferritin 


 


Total Bilirubin 


 


AST 


 


ALT 


 


Alkaline Phosphatase 


 


LD Total 


 


Total Protein 


 


Albumin 


 


Triglycerides 


 


Babesia microti IgG Ab 


 


Babesia microti IgM Ab 


 


Lyme Screen IgG & IgM 


 


West Nile Virus IgG Ab 


 


West Nile Virus IgM Ab 








 Impression:





1.  Recent chills, rigors coughrelated to prolonged exposure to working in the 

cold and wet environment, etiology is being determined:


a). Possibly related to pneumonia.


b). Viral infection is being  excluded.


c). Prosthetic valve endocarditis needs exclusion.


2. Hypertension, hypertensive cardiovascular disease.


3.  Severe left ventricular diastolic dysfunction.


4. Chronic obstructive pulmonary disease.


5.  Status postbioprosthetic aortic valve replacement.





Recommendations:


1.  Case discussed with CCU resident and concur with obtaining a HARRIETT.


2.  Continue medications as outlined.


3.  Follow-up CT scan of the chest.


4.  Further suggestions as necessary.





Prognosis: Guarded.

## 2019-11-04 NOTE — PN
Physical Exam: 


SUBJECTIVE: Patient seen and examined at bedside. Patient without any 

complaints. He had a 102F fever overnight which resolved after taking Tylenol. 

Denies any cough, chest pain, shortness of breath, abd pain. 








OBJECTIVE:





 Vital Signs











 Period  Temp  Pulse  Resp  BP Sys/Rosales  Pulse Ox


 


 Last 24 Hr  98.7 F-102 F  59-74  15-24  114-164/  96-97











GENERAL: The patient is awake, alert, and fully oriented, in no acute distress.


HEAD: Normal with no signs of trauma.


EYES: EOMI


ENT: dry mucous membranes 


NECK: Trachea midline, full range of motion, supple. 


LUNGS: Breath sounds equal, clear to auscultation bilaterally 


HEART: irregularly irregular 


ABDOMEN: Soft, nontender, nondistended, normoactive bowel sounds


EXTREMITIES: 2+ pulses, warm, well-perfused, no edema. 


NEUROLOGICAL: Normal speech, gait not observed. 5/5 strength upper and lower 

extremities 


PSYCH: Normal mood, normal affect.


SKIN: Warm, dry, normal turgor, no rashes or lesions noted














 Laboratory Results - last 24 hr











  11/04/19 11/04/19 11/04/19





  05:50 05:50 05:50


 


WBC  1.8 L*  


 


RBC  4.76  


 


Hgb  14.1  


 


Hct  41.7  


 


MCV  87.6  


 


MCH  29.6  


 


MCHC  33.7  


 


RDW  13.6  


 


Plt Count  61 L D  


 


MPV  9.1  


 


Absolute Neuts (auto)  1.1 L  


 


Neutrophils %  62.6  


 


Neutrophils % (Manual)  58.1  


 


Band Neutrophils %  3.8  


 


Lymphocytes %  28.0  D  


 


Lymphocytes % (Manual)  8.6  


 


Monocytes %  7.1  


 


Monocytes % (Manual)  6  


 


Eosinophils %  1.5  


 


Eosinophils % (Manual)  1.9  


 


Basophils %  0.8  


 


Basophils % (Manual)  1.9  


 


Myelocytes % (Man)  0  


 


Promyelocytes % (Man)  0  


 


Blast Cells % (Manual)  0  


 


Nucleated RBC %  0  


 


Metamyelocytes  0  


 


Hypochromia  0  


 


Platelet Estimate  Decreased  


 


Polychromasia  1+  


 


Poikilocytosis  0  


 


Anisocytosis  0  


 


Microcytosis  1+  


 


Macrocytosis  0  


 


PT with INR    20.80 H


 


INR    1.75 H


 


PTT (Actin FS)    40.5 H


 


Sodium   136 


 


Potassium   3.2 L 


 


Chloride   100 


 


Carbon Dioxide   30 


 


Anion Gap   6 L 


 


BUN   30.4 H 


 


Creatinine   1.4 H 


 


Est GFR (CKD-EPI)AfAm   53.85 


 


Est GFR (CKD-EPI)NonAf   46.46 


 


Random Glucose   113 H 


 


Calcium   8.8 


 


Magnesium   2.2 


 


Ferritin   


 


Total Bilirubin   2.7 H 


 


AST   183 H 


 


ALT   96 H 


 


Alkaline Phosphatase   404 H 


 


LD Total   398 H 


 


Total Protein   5.9 L 


 


Albumin   2.8 L 


 


Triglycerides   135 














  11/04/19





  05:50


 


WBC 


 


RBC 


 


Hgb 


 


Hct 


 


MCV 


 


MCH 


 


MCHC 


 


RDW 


 


Plt Count 


 


MPV 


 


Absolute Neuts (auto) 


 


Neutrophils % 


 


Neutrophils % (Manual) 


 


Band Neutrophils % 


 


Lymphocytes % 


 


Lymphocytes % (Manual) 


 


Monocytes % 


 


Monocytes % (Manual) 


 


Eosinophils % 


 


Eosinophils % (Manual) 


 


Basophils % 


 


Basophils % (Manual) 


 


Myelocytes % (Man) 


 


Promyelocytes % (Man) 


 


Blast Cells % (Manual) 


 


Nucleated RBC % 


 


Metamyelocytes 


 


Hypochromia 


 


Platelet Estimate 


 


Polychromasia 


 


Poikilocytosis 


 


Anisocytosis 


 


Microcytosis 


 


Macrocytosis 


 


PT with INR 


 


INR 


 


PTT (Actin FS) 


 


Sodium 


 


Potassium 


 


Chloride 


 


Carbon Dioxide 


 


Anion Gap 


 


BUN 


 


Creatinine 


 


Est GFR (CKD-EPI)AfAm 


 


Est GFR (CKD-EPI)NonAf 


 


Random Glucose 


 


Calcium 


 


Magnesium 


 


Ferritin  1871.8 H


 


Total Bilirubin 


 


AST 


 


ALT 


 


Alkaline Phosphatase 


 


LD Total 


 


Total Protein 


 


Albumin 


 


Triglycerides 








Active Medications











Generic Name Dose Route Start Last Admin





  Trade Name Freq  PRN Reason Stop Dose Admin


 


Acetaminophen  650 mg  11/01/19 02:18  11/04/19 01:33





  Tylenol -  PO   650 mg





  Q6H PRN   Administration





  FEVER   





     





     





     


 


Amlodipine Besylate  5 mg  11/03/19 16:40  11/04/19 09:49





  Norvasc -  PO   5 mg





  DAILY LARISA   Administration





     





     





     





     


 


Apixaban  5 mg  11/01/19 10:00  11/03/19 21:52





  Eliquis -  PO   5 mg





  BID LARISA   Administration





     





     





     





     


 


Cyanocobalamin  1,000 mcg  11/01/19 10:00  11/03/19 10:21





  Vitamin B12 -  PO   1,000 mcg





  DAILY LARISA   Administration





     





     





     





     


 


Furosemide  40 mg  11/02/19 06:00  11/04/19 06:51





  Lasix Injection -  IVPUSH   40 mg





  BID@0600,1400 LARISA   Administration





     





     





     





     


 


Cefepime HCl 1 gm/ Dextrose  100 mls @ 200 mls/hr  11/01/19 18:40  11/04/19 09:

50





  IVPB   200 mls/hr





  Q8H-IV LARISA   Administration





     





     





  Protocol   





     


 


Doxycycline Hyclate 100 mg/  100 mls @ 100 mls/hr  11/02/19 15:30  11/04/19 09:

45





  Dextrose  IVPB   100 mls/hr





  BID LARISA   Administration





     





     





     





     


 


Metoprolol Succinate  25 mg  11/01/19 10:00  11/04/19 09:50





  Toprol Xl -  PO   25 mg





  DAILY LARISA   Administration





     





     





     





     


 


Rosuvastatin Calcium  5 mg  11/01/19 22:00  11/03/19 21:52





  Crestor -  PO   5 mg





  HS LARISA   Administration





     





     





     





     


 


Valsartan  160 mg  11/03/19 10:00  11/04/19 09:38





  Diovan -  PO   160 mg





  DAILY LARISA   Administration





     





     





     





     











ASSESSMENT/PLAN:


85 y/o/m with a significant past medical history of hypertension, hyperlipidemia

, prosthetic aortic valve replacement (bovine), afib on eliquis. Presented to 

the [facility] ED with ataxia, chills, rigors and weakness. Patient was admitted to 

floors but then transferred to the ICU for persistent fevers on the floors.





#Neuro


- Patient AAOx3


- CTH 10/31 did not show acute intracranial pathology


- no evidence of CVA per neuro


- Neuro on board 





#Pulm 


- saturating well on room air, maintain O2% >90


- CXR and Chest CT negative for evidence of infiltrate or consolidation 


- continue home CPAP





#Cards - 


- hx of HTN, HLD, a fib


- concern for culture negative endocarditis - spoke with rebeca Newsome. 

Consulted Dr. Staley for HARRIETT


- Carotid duplex 11/1 showed no significant stenosis


- chest CT 11/1 showed mediastinal adenopathy, increased compared to previous 

study 


- echo 11/1 showed EF 50-55%, LA severely dilated, mod-severe mitral annular 

calcification, elevated RVSP 30-40mmHg


- EKG 10/31 showed rate controlled a fib w RBBB


- continue home crestor, metoprolol, lasix, norvasc


- given 5 norvasc x1 for HTN


- Cardio on board 





#GI


- CT A/P 11/1 did not show any acute abdominal pathology


- continue vitamin B12





#


- no evidence of UTI


- I/Os





#Endo


- normoglycemic, no active issues





#ID 


- patient continues to spike fevers. WBC downtrending, now at 1.8


- Cefepime 1g Q8hr


- Doxycycline 100mg BID


- tylenol PRN for fever


- cultures and Legionella antigen negative 


- Lumbar/thoracic CT negative for infectious etiology 


- influenza neg


- ID on board 





#FEN


- hypokalemic, repleted with Kdur, monitor and replete as needed  


- sodium controlled diet 





#PPX


- eliquis on hold for HARRIETT


- no GI ppx





#Dispo 


- Transfer patient to tele 








Visit type





- Emergency Visit


Emergency Visit: Yes


ED Registration Date: 11/01/19


Care time: The patient presented to the Emergency Department on the above date 

and was hospitalized for further evaluation of their emergent condition.





- New Patient


This patient is new to me today: Yes


Date on this admission: 11/05/19





- Critical Care


Critical Care patient: Yes


Total Critical Care Time (in minutes): 36


Critical Care Statement: The care of this patient involved high complexity 

decision making to prevent further life threatening deterioration of the patient

's condition and/or to evaluate & treat vital organ system(s) failure or risk 

of failure.





ATTENDING PHYSICIAN STATEMENT





I saw and evaluated the patient.


I reviewed the resident's note and discussed the case with the resident.


I agree with the resident's findings and plan as documented.








SUBJECTIVE:








OBJECTIVE:








ASSESSMENT AND PLAN:

## 2019-11-04 NOTE — PN
Progress Note, Physician


History of Present Illness: 





patient continues to spike fever


wbc decreasing


patient weak


all results returning negative so far





- Current Medication List


Current Medications: 


Active Medications





Acetaminophen (Tylenol -)  650 mg PO Q6H PRN


   PRN Reason: FEVER


   Last Admin: 11/04/19 01:33 Dose:  650 mg


Amlodipine Besylate (Norvasc -)  5 mg PO DAILY [facility]


   Last Admin: 11/04/19 09:49 Dose:  5 mg


Apixaban (Eliquis -)  5 mg PO BID [facility]


   Last Admin: 11/03/19 21:52 Dose:  5 mg


Cyanocobalamin (Vitamin B12 -)  1,000 mcg PO DAILY [facility]


   Last Admin: 11/03/19 10:21 Dose:  1,000 mcg


Furosemide (Lasix Injection -)  40 mg IVPUSH BID@0600,1400 [facility]


   Last Admin: 11/04/19 06:51 Dose:  40 mg


Cefepime HCl 1 gm/ Dextrose  100 mls @ 200 mls/hr IVPB Q8H-IV [facility]; Protocol


   Last Admin: 11/04/19 09:50 Dose:  200 mls/hr


Doxycycline Hyclate 100 mg/ (Dextrose)  100 mls @ 100 mls/hr IVPB BID [facility]


   Last Admin: 11/04/19 09:45 Dose:  100 mls/hr


Metoprolol Succinate (Toprol Xl -)  25 mg PO DAILY [facility]


   Last Admin: 11/04/19 09:50 Dose:  25 mg


Rosuvastatin Calcium (Crestor -)  5 mg PO HS [facility]


   Last Admin: 11/03/19 21:52 Dose:  5 mg


Valsartan (Diovan -)  160 mg PO DAILY [facility]


   Last Admin: 11/04/19 09:38 Dose:  160 mg











- Objective


Vital Signs: 


 Vital Signs











Temperature  98.9 F   11/04/19 10:00


 


Pulse Rate  62   11/04/19 10:00


 


Respiratory Rate  18   11/04/19 10:00


 


Blood Pressure  158/65   11/04/19 10:00


 


O2 Sat by Pulse Oximetry (%)  96   11/04/19 09:00











Constitutional: Yes: Calm, Mild Distress


Neck: Yes: Supple, Trachea Midline


Cardiovascular: Yes: Pulse Irregular, Murmur


Respiratory: Yes: Regular, CTA Bilaterally


Gastrointestinal: Yes: Normal Bowel Sounds, Soft


Musculoskeletal: Yes: WNL


Extremities: Yes: WNL


Neurological: Yes: Alert, Oriented


Psychiatric: Yes: Alert, Oriented


Labs: 


 CBC, BMP





 11/04/19 05:50 





 11/04/19 05:50 





 INR, PTT











INR  1.75  (0.83-1.09)  H  11/04/19  05:50    














Assessment/Plan








Problem List





- Problems


(1) Severe sepsis


Code(s): A41.9 - SEPSIS, UNSPECIFIED ORGANISM; R65.20 - SEVERE SEPSIS WITHOUT 

SEPTIC SHOCK   





(2) DPITI (obstructive sleep apnea)


Code(s): G47.33 - OBSTRUCTIVE SLEEP APNEA (ADULT) (PEDIATRIC)   





(3) HLD (hyperlipidemia)


Code(s): E78.5 - HYPERLIPIDEMIA, UNSPECIFIED   





(4) HTN (hypertension)


Code(s): I10 - ESSENTIAL (PRIMARY) HYPERTENSION   





(5) S/P aortic valve replacement 


Code(s): Z95.2 - PRESENCE OF PROSTHETIC HEART VALVE   





(6) Afib


Code(s): I48.91 - UNSPECIFIED ATRIAL FIBRILLATION   





plan


continue current mgmt


blood smear negative


rest as per the team


patient still spiking fevers


await for all results


continue supportive care





cc 45 min

## 2019-11-04 NOTE — PN
Physical Exam: 





Heme/Onc Consult


SUBJECTIVE: Patient seen and examined at bedside. 








OBJECTIVE:





 Vital Signs











 Period  Temp  Pulse  Resp  BP Sys/Rosales  Pulse Ox


 


 Last 24 Hr  98.7 F-102 F  57-88  15-24  114-181/  96-97











GENERAL: The patient is awake, alert, and fully oriented, in no acute distress.


HEAD: Normal with no signs of trauma.


EYES: PERRL, extraocular movements intact, sclera anicteric, conjunctiva clear. 

No ptosis. 


NECK: Trachea midline, full range of motion, supple. 


LUNGS: Breath sounds equal, clear to auscultation bilaterally, no wheezes, no 

crackles, no 


accessory muscle use. 


HEART: Regular rate and irregular rhythm, S1, S2 without murmur, rub or gallop.


ABDOMEN: Soft, nontender, nondistended, normoactive bowel sounds, no guarding, 

no 


rebound, no hepatosplenomegaly, no masses.


EXTREMITIES: 2+ pulses, warm, well-perfused, no edema. 


NEUROLOGICAL: Cranial nerves II through XII grossly intact. Normal speech, gait 

not 


observed.


PSYCH: Normal mood, normal affect.


SKIN: Warm, dry, normal turgor, no rashes or lesions noted














 Laboratory Results - last 24 hr











  11/02/19 11/04/19 11/04/19





  15:10 05:50 05:50


 


WBC   1.8 L* 


 


RBC   4.76 


 


Hgb   14.1 


 


Hct   41.7 


 


MCV   87.6 


 


MCH   29.6 


 


MCHC   33.7 


 


RDW   13.6 


 


Plt Count   61 L D 


 


MPV   9.1 


 


Absolute Neuts (auto)   1.1 L 


 


Neutrophils %   62.6 


 


Neutrophils % (Manual)   58.1 


 


Band Neutrophils %   3.8 


 


Lymphocytes %   28.0  D 


 


Lymphocytes % (Manual)   8.6 


 


Monocytes %   7.1 


 


Monocytes % (Manual)   6 


 


Eosinophils %   1.5 


 


Eosinophils % (Manual)   1.9 


 


Basophils %   0.8 


 


Basophils % (Manual)   1.9 


 


Myelocytes % (Man)   0 


 


Promyelocytes % (Man)   0 


 


Blast Cells % (Manual)   0 


 


Nucleated RBC %   0 


 


Metamyelocytes   0 


 


Hypochromia   0 


 


Platelet Estimate   Decreased 


 


Polychromasia   1+ 


 


Poikilocytosis   0 


 


Anisocytosis   0 


 


Microcytosis   1+ 


 


Macrocytosis   0 


 


PT with INR   


 


INR   


 


PTT (Actin FS)   


 


Sodium    136


 


Potassium    3.2 L


 


Chloride    100


 


Carbon Dioxide    30


 


Anion Gap    6 L


 


BUN    30.4 H


 


Creatinine    1.4 H


 


Est GFR (CKD-EPI)AfAm    53.85


 


Est GFR (CKD-EPI)NonAf    46.46


 


Random Glucose    113 H


 


Calcium    8.8


 


Magnesium    2.2


 


Ferritin   


 


Total Bilirubin    2.7 H


 


AST    183 H


 


ALT    96 H


 


Alkaline Phosphatase    404 H


 


LD Total    398 H


 


Total Protein    5.9 L


 


Albumin    2.8 L


 


Triglycerides    135


 


West Nile Virus IgG Ab  Negative  


 


West Nile Virus IgM Ab  Negative  














  11/04/19 11/04/19





  05:50 05:50


 


WBC  


 


RBC  


 


Hgb  


 


Hct  


 


MCV  


 


MCH  


 


MCHC  


 


RDW  


 


Plt Count  


 


MPV  


 


Absolute Neuts (auto)  


 


Neutrophils %  


 


Neutrophils % (Manual)  


 


Band Neutrophils %  


 


Lymphocytes %  


 


Lymphocytes % (Manual)  


 


Monocytes %  


 


Monocytes % (Manual)  


 


Eosinophils %  


 


Eosinophils % (Manual)  


 


Basophils %  


 


Basophils % (Manual)  


 


Myelocytes % (Man)  


 


Promyelocytes % (Man)  


 


Blast Cells % (Manual)  


 


Nucleated RBC %  


 


Metamyelocytes  


 


Hypochromia  


 


Platelet Estimate  


 


Polychromasia  


 


Poikilocytosis  


 


Anisocytosis  


 


Microcytosis  


 


Macrocytosis  


 


PT with INR  20.80 H 


 


INR  1.75 H 


 


PTT (Actin FS)  40.5 H 


 


Sodium  


 


Potassium  


 


Chloride  


 


Carbon Dioxide  


 


Anion Gap  


 


BUN  


 


Creatinine  


 


Est GFR (CKD-EPI)AfAm  


 


Est GFR (CKD-EPI)NonAf  


 


Random Glucose  


 


Calcium  


 


Magnesium  


 


Ferritin   1871.8 H


 


Total Bilirubin  


 


AST  


 


ALT  


 


Alkaline Phosphatase  


 


LD Total  


 


Total Protein  


 


Albumin  


 


Triglycerides  


 


West Nile Virus IgG Ab  


 


West Nile Virus IgM Ab  








Active Medications











Generic Name Dose Route Start Last Admin





  Trade Name Freq  PRN Reason Stop Dose Admin


 


Acetaminophen  650 mg  11/01/19 02:18  11/04/19 13:47





  Tylenol -  PO   650 mg





  Q6H PRN   Administration





  FEVER   





     





     





     


 


Amlodipine Besylate  5 mg  11/03/19 16:40  11/04/19 09:49





  Norvasc -  PO   5 mg





  DAILY LARISA   Administration





     





     





     





     


 


Apixaban  5 mg  11/01/19 10:00  11/03/19 21:52





  Eliquis -  PO   5 mg





  BID LARISA   Administration





     





     





     





     


 


Cyanocobalamin  1,000 mcg  11/01/19 10:00  11/04/19 13:48





  Vitamin B12 -  PO   1,000 mcg





  DAILY LARISA   Administration





     





     





     





     


 


Furosemide  40 mg  11/02/19 06:00  11/04/19 13:47





  Lasix Injection -  IVPUSH   40 mg





  BID@0600,1400 LARISA   Administration





     





     





     





     


 


Cefepime HCl 1 gm/ Dextrose  100 mls @ 200 mls/hr  11/01/19 18:40  11/04/19 09:

50





  IVPB   200 mls/hr





  Q8H-IV LARISA   Administration





     





     





  Protocol   





     


 


Doxycycline Hyclate 100 mg/  100 mls @ 100 mls/hr  11/02/19 15:30  11/04/19 09:

45





  Dextrose  IVPB   100 mls/hr





  BID LARISA   Administration





     





     





     





     


 


Metoprolol Succinate  25 mg  11/01/19 10:00  11/04/19 09:50





  Toprol Xl -  PO   25 mg





  DAILY LARISA   Administration





     





     





     





     


 


Rosuvastatin Calcium  5 mg  11/01/19 22:00  11/03/19 21:52





  Crestor -  PO   5 mg





  HS LARISA   Administration





     





     





     





     


 


Valsartan  160 mg  11/03/19 10:00  11/04/19 09:38





  Diovan -  PO   160 mg





  DAILY LARISA   Administration





     





     





     





     











ASSESSMENT/PLAN:





Pt is an 83 y/o M with PMH HTN, HLD, Prosthetic Aortic Valve (Bovine), afib (on 

eliquis) who was admitted to ICU for close monitoring of Sepsis with leukopenia.





Thrombocytopenia/Leukopenia


-Likely 2/2 sepsis. ? HLH


-on abx


-ID on board


-LDH elevated at 398, elevated ferritin 1871, Fe 34, TIBC 232, Fe sat 14, INR 

1.75, , CD 25  pending


-May need BM Bx to r/o HLH


-Consider autoimmune cause. Will get ESR, CRP, RF, SANTOS





Possible Infectious causes being worked up by ID/Primary team


-RPR, West Nile neg


-Babesia, Erlichia, Lyme, E. chafensis pending


-? HARRIETT to r/o Cx. neg endocarditis





?Hepatitis vs MODS 2/2 sepsis


-Liver enzymes increased dramatically from yest


-liver function decreased from prior with dropping alb and increasing INR


-Bili elevation


-notably, renal function not significantly worsened, which speaks against MODS 

as well as normal BP.











Visit type





- Emergency Visit


Emergency Visit: No





- New Patient


This patient is new to me today: Yes


Date on this admission: 11/05/19





- Critical Care


Critical Care patient: No





ATTENDING PHYSICIAN STATEMENT





I saw and evaluated the patient.


I reviewed the resident's note and discussed the case with the resident.


I agree with the resident's findings and plan as documented.








SUBJECTIVE:








OBJECTIVE:








ASSESSMENT AND PLAN:

## 2019-11-04 NOTE — PN
Physical Exam: 


SUBJECTIVE: Patient seen and examined at the bedside.  not feeling well today, 

in bed having rigors.  denies pain.  wife at bedside. 





OBJECTIVE:


neutropenic, wbc 1.8 with fever 102F overnight.


blood smear negative, rpr negative. viral panel pending


thrombocytopenia @ 61


worsening liver function, elevated ast/alt.  alk phos elevated, bili 2.7.





Patient is an 84 year old male with a significant past medical history of 

hypertension, hyperlipidemia, prosthetic aortic valve replacement (bovine). 

afib (on eliquis).   He presented to the [facility] ED with ataxia, chills, rigors and 

weakness.  patient had rigors at home and brought into the ED by his family for 

further evaluation.    He transferred to the ICU closer monitoring.  today's 

labs again noted to have decrease in wbc and platelets and is now neutropenic. 

  viral panel pending.


 Vital Signs











 Period  Temp  Pulse  Resp  BP Sys/Rosales  Pulse Ox


 


 Last 24 Hr  98.7 F-102.9 F  57-88  15-24  134-181/  96-97





 


GENERAL: The patient is awake, alert, weak appearing, tolerating room air. 

having rigors.  fatigued.


HEAD: Normal with no signs of trauma.


EYES: PERRL, extraocular movements intact, sclera anicteric, conjunctiva clear. 

No ptosis. 


ENT: Ears normal, nares patent, oropharynx clear without exudates, moist mucous 

membranes.


NECK: Trachea midline, full range of motion, supple. 


LUNGS: Breath sounds equal, but diminished bilaterally, no wheeze


HEART: Regular rate and rhythm


ABDOMEN: Soft, nontender, nondistended, obese abdomen


EXTREMITIES:  no edema. 


NEUROLOGICAL:  Normal speech, gait not observed.


PSYCH: Normal mood, normal affect.


SKIN: Warm, dry, normal turgor, no rashes or lesions noted


 Laboratory Results - last 24 hr











  11/02/19 11/02/19 11/04/19





  15:10 15:10 05:50


 


WBC    1.8 L*


 


RBC    4.76


 


Hgb    14.1


 


Hct    41.7


 


MCV    87.6


 


MCH    29.6


 


MCHC    33.7


 


RDW    13.6


 


Plt Count    61 L D


 


MPV    9.1


 


Absolute Neuts (auto)    1.1 L


 


Neutrophils %    62.6


 


Neutrophils % (Manual)    58.1


 


Band Neutrophils %    3.8


 


Lymphocytes %    28.0  D


 


Lymphocytes % (Manual)    8.6


 


Monocytes %    7.1


 


Monocytes % (Manual)    6


 


Eosinophils %    1.5


 


Eosinophils % (Manual)    1.9


 


Basophils %    0.8


 


Basophils % (Manual)    1.9


 


Myelocytes % (Man)    0


 


Promyelocytes % (Man)    0


 


Blast Cells % (Manual)    0


 


Nucleated RBC %    0


 


Metamyelocytes    0


 


Hypochromia    0


 


Platelet Estimate    Decreased


 


Polychromasia    1+


 


Poikilocytosis    0


 


Anisocytosis    0


 


Microcytosis    1+


 


Macrocytosis    0


 


PT with INR   


 


INR   


 


PTT (Actin FS)   


 


Sodium   


 


Potassium   


 


Chloride   


 


Carbon Dioxide   


 


Anion Gap   


 


BUN   


 


Creatinine   


 


Est GFR (CKD-EPI)AfAm   


 


Est GFR (CKD-EPI)NonAf   


 


Random Glucose   


 


Calcium   


 


Magnesium   


 


Iron   


 


TIBC   


 


Iron Saturation   


 


Unsaturated IBC   


 


Ferritin   


 


Total Bilirubin   


 


AST   


 


ALT   


 


Alkaline Phosphatase   


 


LD Total   


 


Total Protein   


 


Albumin   


 


Triglycerides   


 


Lyme Screen IgG & IgM  <0.91  


 


West Nile Virus IgG Ab   Negative 


 


West Nile Virus IgM Ab   Negative 














  11/04/19 11/04/19 11/04/19





  05:50 05:50 05:50


 


WBC   


 


RBC   


 


Hgb   


 


Hct   


 


MCV   


 


MCH   


 


MCHC   


 


RDW   


 


Plt Count   


 


MPV   


 


Absolute Neuts (auto)   


 


Neutrophils %   


 


Neutrophils % (Manual)   


 


Band Neutrophils %   


 


Lymphocytes %   


 


Lymphocytes % (Manual)   


 


Monocytes %   


 


Monocytes % (Manual)   


 


Eosinophils %   


 


Eosinophils % (Manual)   


 


Basophils %   


 


Basophils % (Manual)   


 


Myelocytes % (Man)   


 


Promyelocytes % (Man)   


 


Blast Cells % (Manual)   


 


Nucleated RBC %   


 


Metamyelocytes   


 


Hypochromia   


 


Platelet Estimate   


 


Polychromasia   


 


Poikilocytosis   


 


Anisocytosis   


 


Microcytosis   


 


Macrocytosis   


 


PT with INR   20.80 H 


 


INR   1.75 H 


 


PTT (Actin FS)   40.5 H 


 


Sodium  136  


 


Potassium  3.2 L  


 


Chloride  100  


 


Carbon Dioxide  30  


 


Anion Gap  6 L  


 


BUN  30.4 H  


 


Creatinine  1.4 H  


 


Est GFR (CKD-EPI)AfAm  53.85  


 


Est GFR (CKD-EPI)NonAf  46.46  


 


Random Glucose  113 H  


 


Calcium  8.8  


 


Magnesium  2.2  


 


Iron  32 L   34 L


 


TIBC    232 L


 


Iron Saturation    14 L


 


Unsaturated IBC    198 L


 


Ferritin    1871.8 H


 


Total Bilirubin  2.7 H  


 


AST  183 H  


 


ALT  96 H  


 


Alkaline Phosphatase  404 H  


 


LD Total  398 H  


 


Total Protein  5.9 L  


 


Albumin  2.8 L  


 


Triglycerides  135  


 


Lyme Screen IgG & IgM   


 


West Nile Virus IgG Ab   


 


West Nile Virus IgM Ab   








Active Medications











Generic Name Dose Route Start Last Admin





  Trade Name Freq  PRN Reason Stop Dose Admin


 


Acetaminophen  650 mg  11/01/19 02:18  11/04/19 13:47





  Tylenol -  PO   650 mg





  Q6H PRN   Administration





  FEVER   





     





     





     


 


Amlodipine Besylate  5 mg  11/03/19 16:40  11/04/19 09:49





  Norvasc -  PO   5 mg





  DAILY LARISA   Administration





     





     





     





     


 


Apixaban  5 mg  11/01/19 10:00  11/03/19 21:52





  Eliquis -  PO   5 mg





  BID LARISA   Administration





     





     





     





     


 


Cyanocobalamin  1,000 mcg  11/01/19 10:00  11/04/19 13:48





  Vitamin B12 -  PO   1,000 mcg





  DAILY LARISA   Administration





     





     





     





     


 


Furosemide  40 mg  11/02/19 06:00  11/04/19 13:47





  Lasix Injection -  IVPUSH   40 mg





  BID@0600,1400 LARISA   Administration





     





     





     





     


 


Cefepime HCl 1 gm/ Dextrose  100 mls @ 200 mls/hr  11/01/19 18:40  11/04/19 09:

50





  IVPB   200 mls/hr





  Q8H-IV LARISA   Administration





     





     





  Protocol   





     


 


Doxycycline Hyclate 100 mg/  100 mls @ 100 mls/hr  11/02/19 15:30  11/04/19 09:

45





  Dextrose  IVPB   100 mls/hr





  BID LARISA   Administration





     





     





     





     


 


Metoprolol Succinate  25 mg  11/01/19 10:00  11/04/19 09:50





  Toprol Xl -  PO   25 mg





  DAILY LARISA   Administration





     





     





     





     


 


Rosuvastatin Calcium  5 mg  11/01/19 22:00  11/03/19 21:52





  Crestor -  PO   5 mg





  HS LARISA   Administration





     





     





     





     


 


Valsartan  160 mg  11/03/19 10:00  11/04/19 09:38





  Diovan -  PO   160 mg





  DAILY LARISA   Administration





     





     





     





     











ASSESSMENT/PLAN:








Problem List





- Problems


(1) Severe sepsis


Assessment/Plan: 


Severe sepsis with neutropenia.  Patient with high grade fevers, chills, malaise

, and fatigue/weakness.  now noted with leukopenia and thrombocytopenia that 

worsen with daily labs.  


Fever of unknown origin, started on emperic antibiotics pending cultures (

cefepime and doxycycline) until tick borne disease can be ruled out with viral 

panel. 


pancultured, lactic acid wnl.  blood and urine cultures negative.  blood 

cultures repeated. 


chest ct shows mediastinal adenopaty with moderate increase since prior exam


CT with gall stones, no acute lb seen, spine CT does not explain source of 

fever


Code(s): A41.9 - SEPSIS, UNSPECIFIED ORGANISM; R65.20 - SEVERE SEPSIS WITHOUT 

SEPTIC SHOCK   





(2) Thrombocytopenia


Assessment/Plan: 


defer using heparin for drop in platelets.  hematology following and notes 

reviewed.


patient was not on any heparin prior to hospitalization and was not on any 

heparin here, therefore, will not order HIT panel.


discussed with his cardiologist, defer heparin gtt and keep on eliquis


bleeding precaution.  no clinical signs of bleeding. 


Code(s): D69.6 - THROMBOCYTOPENIA, UNSPECIFIED   





(3) Leukopenia


Assessment/Plan: 


neutropenia of unclear etiology, but concern since patient has severe sepsis 

and now with low WBC count @ 1.8


viral panel ordered, rpr negative, blood smear negative


leukopenia may be due to acute infection, however, platelets are decreasing also


repeat cbc and monitor daily. 


heme consulted


patient may need granix if leukopenia worsens/persists.


Code(s): D72.819 - DECREASED WHITE BLOOD CELL COUNT, UNSPECIFIED   





(4) DIPTI (obstructive sleep apnea)


Assessment/Plan: 


continue home cpap


Code(s): G47.33 - OBSTRUCTIVE SLEEP APNEA (ADULT) (PEDIATRIC)   





(5) HLD (hyperlipidemia)


Assessment/Plan: 


continue home meds


Code(s): E78.5 - HYPERLIPIDEMIA, UNSPECIFIED   





(6) HTN (hypertension)


Assessment/Plan: 


elevated, on metoprolol. started on norvasc 2.5mg 


Code(s): I10 - ESSENTIAL (PRIMARY) HYPERTENSION   





(7) S/P aortic valve replacement


Assessment/Plan: 


discussed with patient's cardiologist who is concerned for endocarditis as 

source of fever


echo noted.  may need further cardiac workup if fevers persist. 


cardiologist following.


Code(s): Z95.2 - PRESENCE OF PROSTHETIC HEART VALVE   





(8) Afib


Assessment/Plan: 


on eliquis 5 bid and metoprol


Code(s): I48.91 - UNSPECIFIED ATRIAL FIBRILLATION   





(9) Abnormal liver enzymes


Assessment/Plan: 


liver enzymes and alk phos increased on todays labs, worse from prior. 

bilirubin elevated at 2.7


ultrasound of liver shows hepatomegaly with fatty inf vs hepatocellar disease


gallstones with no acute lb.


will consult Gi for further recommendations.


Code(s): R74.8 - ABNORMAL LEVELS OF OTHER SERUM ENZYMES   





(10) Prophylactic measure


Assessment/Plan: 


fen


tolerating po


monitor electrolyles


low salt diet as tolerated





on eliquis 5 bid





full code





Code(s): Z29.9 - ENCOUNTER FOR PROPHYLACTIC MEASURES, UNSPECIFIED   





Visit type





- Emergency Visit


Emergency Visit: Yes


ED Registration Date: 11/01/19


Care time: The patient presented to the Emergency Department on the above date 

and was hospitalized for further evaluation of their emergent condition.





- New Patient


This patient is new to me today: No





- Critical Care


Critical Care patient: Yes


Total Critical Care Time (in minutes): 45


Critical Care Statement: The care of this patient involved high complexity 

decision making to prevent further life threatening deterioration of the patient

's condition and/or to evaluate & treat vital organ system(s) failure or risk 

of failure.

## 2019-11-04 NOTE — PN
Teaching Attending Note


Name of Resident: Shantal Penaloza





ATTENDING PHYSICIAN STATEMENT





I saw and evaluated the patient.


I reviewed the resident's note and discussed the case with the resident.


I agree with the resident's findings and plan as documented.








SUBJECTIVE:


Patient seen and examined in the ICU.  Awake and oriented. 


Reports feeling overall better. 


No CP or SOB. 


Fever overnight, but afebrile this AM. 











 Intake & Output











 11/02/19 11/03/19 11/03/19 11/04/19





 00:59 00:59 23:59 23:59


 


Intake Total    1100


 


Output Total    300


 


Balance    800


 


Weight    210 lb 4.8 oz











 Last Vital Signs











Temp Pulse Resp BP Pulse Ox


 


 98.9 F   62   18   158/65   96 


 


 11/04/19 10:00  11/04/19 10:00  11/04/19 10:00  11/04/19 10:00  11/04/19 09:00











Active Medications





Acetaminophen (Tylenol -)  650 mg PO Q6H PRN


   PRN Reason: FEVER


   Last Admin: 11/04/19 01:33 Dose:  650 mg


Amlodipine Besylate (Norvasc -)  5 mg PO DAILY [facility]


   Last Admin: 11/04/19 09:49 Dose:  5 mg


Apixaban (Eliquis -)  5 mg PO BID [facility]


   Last Admin: 11/03/19 21:52 Dose:  5 mg


Cyanocobalamin (Vitamin B12 -)  1,000 mcg PO DAILY [facility]


   Last Admin: 11/03/19 10:21 Dose:  1,000 mcg


Furosemide (Lasix Injection -)  40 mg IVPUSH BID@0600,1400 [facility]


   Last Admin: 11/04/19 06:51 Dose:  40 mg


Cefepime HCl 1 gm/ Dextrose  100 mls @ 200 mls/hr IVPB Q8H-IV LARISA; Protocol


   Last Admin: 11/04/19 09:50 Dose:  200 mls/hr


Doxycycline Hyclate 100 mg/ (Dextrose)  100 mls @ 100 mls/hr IVPB BID [facility]


   Last Admin: 11/04/19 09:45 Dose:  100 mls/hr


Metoprolol Succinate (Toprol Xl -)  25 mg PO DAILY [facility]


   Last Admin: 11/04/19 09:50 Dose:  25 mg


Rosuvastatin Calcium (Crestor -)  5 mg PO HS [facility]


   Last Admin: 11/03/19 21:52 Dose:  5 mg


Valsartan (Diovan -)  160 mg PO DAILY [facility]


   Last Admin: 11/04/19 09:38 Dose:  160 mg





neuro: Awake, oriented x3


HEENT: PERRL, anicteric sclera


Lungs: clear


Heart: AF, rate controlled


Abd: Soft, non-tender, obese


Ext: no edema, warm


Skin: warm, no signs of abscess or cellulitis  





  Laboratory Results - last 24 hr











  11/03/19 11/04/19 11/04/19





  05:45 05:50 05:50


 


WBC   1.8 L* 


 


RBC   4.76 


 


Hgb   14.1 


 


Hct   41.7 


 


MCV   87.6 


 


MCH   29.6 


 


MCHC   33.7 


 


RDW   13.6 


 


Plt Count   61 L D 


 


MPV   9.1 


 


Absolute Neuts (auto)   1.1 L 


 


Neutrophils %   62.6 


 


Neutrophils % (Manual)   58.1 


 


Band Neutrophils %   3.8 


 


Lymphocytes %   28.0  D 


 


Lymphocytes % (Manual)   8.6 


 


Monocytes %   7.1 


 


Monocytes % (Manual)   6 


 


Eosinophils %   1.5 


 


Eosinophils % (Manual)   1.9 


 


Basophils %   0.8 


 


Basophils % (Manual)   1.9 


 


Myelocytes % (Man)   0 


 


Promyelocytes % (Man)   0 


 


Blast Cells % (Manual)   0 


 


Nucleated RBC %   0 


 


Metamyelocytes   0 


 


Hypochromia   0 


 


Platelet Estimate   Decreased 


 


Polychromasia   1+ 


 


Poikilocytosis   0 


 


Anisocytosis   0 


 


Microcytosis   1+ 


 


Macrocytosis   0 


 


PT with INR   


 


INR   


 


PTT (Actin FS)   


 


Sodium    136


 


Potassium    3.2 L


 


Chloride    100


 


Carbon Dioxide    30


 


Anion Gap    6 L


 


BUN    30.4 H


 


Creatinine    1.4 H


 


Est GFR (CKD-EPI)AfAm    53.85


 


Est GFR (CKD-EPI)NonAf    46.46


 


Random Glucose    113 H


 


Calcium    8.8


 


Magnesium    2.2


 


Ferritin   


 


Total Bilirubin    2.7 H


 


AST    183 H


 


ALT    96 H


 


Alkaline Phosphatase    404 H


 


LD Total    398 H


 


Total Protein    5.9 L


 


Albumin    2.8 L


 


Triglycerides    135


 


RPR Titer  Nonreactive  














  11/04/19 11/04/19





  05:50 05:50


 


WBC  


 


RBC  


 


Hgb  


 


Hct  


 


MCV  


 


MCH  


 


MCHC  


 


RDW  


 


Plt Count  


 


MPV  


 


Absolute Neuts (auto)  


 


Neutrophils %  


 


Neutrophils % (Manual)  


 


Band Neutrophils %  


 


Lymphocytes %  


 


Lymphocytes % (Manual)  


 


Monocytes %  


 


Monocytes % (Manual)  


 


Eosinophils %  


 


Eosinophils % (Manual)  


 


Basophils %  


 


Basophils % (Manual)  


 


Myelocytes % (Man)  


 


Promyelocytes % (Man)  


 


Blast Cells % (Manual)  


 


Nucleated RBC %  


 


Metamyelocytes  


 


Hypochromia  


 


Platelet Estimate  


 


Polychromasia  


 


Poikilocytosis  


 


Anisocytosis  


 


Microcytosis  


 


Macrocytosis  


 


PT with INR  20.80 H 


 


INR  1.75 H 


 


PTT (Actin FS)  40.5 H 


 


Sodium  


 


Potassium  


 


Chloride  


 


Carbon Dioxide  


 


Anion Gap  


 


BUN  


 


Creatinine  


 


Est GFR (CKD-EPI)AfAm  


 


Est GFR (CKD-EPI)NonAf  


 


Random Glucose  


 


Calcium  


 


Magnesium  


 


Ferritin   1871.8 H


 


Total Bilirubin  


 


AST  


 


ALT  


 


Alkaline Phosphatase  


 


LD Total  


 


Total Protein  


 


Albumin  


 


Triglycerides  


 


RPR Titer  




















ASSESSMENT:


Fever of unknown origin with altered mental status, resolving 


Toxic Metabolic Encephalopathy 


Low clinical suspicion of Meningitis 


Sepsis


Leukopenia


AF on chronic AC


AS s/p porcine valve 2009


HTN


CHF


DIPTI


COPD


CKD








PLAN:


-Abx per ID


-Follow up all pending cultures 


-Lasix


-BD TX PRN 


-CPAP at night and when sleeping


-Oxygen to maintain saturation 


-Rate control


-Will hold LP for now 


-DW cardiology for the possibility of a HARRIETT (? Culture negative Endocarditis) 


-Cardiac Telemetry monitoring 








Dr Becerra

## 2019-11-05 LAB
ALBUMIN SERPL-MCNC: 2.9 G/DL (ref 3.4–5)
ALP SERPL-CCNC: 444 U/L (ref 45–117)
ALT SERPL-CCNC: 108 U/L (ref 13–61)
ANION GAP SERPL CALC-SCNC: 6 MMOL/L (ref 8–16)
AST SERPL-CCNC: 169 U/L (ref 15–37)
BASOPHILS # BLD: 0.7 % (ref 0–2)
BILIRUB SERPL-MCNC: 2.6 MG/DL (ref 0.2–1)
BUN SERPL-MCNC: 29.7 MG/DL (ref 7–18)
CALCIUM SERPL-MCNC: 9.2 MG/DL (ref 8.5–10.1)
CHLORIDE SERPL-SCNC: 99 MMOL/L (ref 98–107)
CO2 SERPL-SCNC: 30 MMOL/L (ref 21–32)
CREAT SERPL-MCNC: 1.2 MG/DL (ref 0.55–1.3)
DEPRECATED RDW RBC AUTO: 13.9 % (ref 11.9–15.9)
EOSINOPHIL # BLD: 2.3 % (ref 0–4.5)
GLUCOSE SERPL-MCNC: 119 MG/DL (ref 74–106)
HCT VFR BLD CALC: 42.2 % (ref 35.4–49)
HGB BLD-MCNC: 14.4 GM/DL (ref 11.7–16.9)
LYMPHOCYTES # BLD: 27.8 % (ref 8–40)
MAGNESIUM SERPL-MCNC: 2.1 MG/DL (ref 1.8–2.4)
MCH RBC QN AUTO: 29.6 PG (ref 25.7–33.7)
MCHC RBC AUTO-ENTMCNC: 34.1 G/DL (ref 32–35.9)
MCV RBC: 86.9 FL (ref 80–96)
MONOCYTES # BLD AUTO: 9 % (ref 3.8–10.2)
NEUTROPHILS # BLD: 60.2 % (ref 42.8–82.8)
PHOSPHATE SERPL-MCNC: 2.3 MG/DL (ref 2.5–4.9)
PLATELET # BLD AUTO: 59 K/MM3 (ref 134–434)
PMV BLD: 9.5 FL (ref 7.5–11.1)
POTASSIUM SERPLBLD-SCNC: 3.5 MMOL/L (ref 3.5–5.1)
PROT SERPL-MCNC: 6 G/DL (ref 6.4–8.2)
RBC # BLD AUTO: 4.86 M/MM3 (ref 4–5.6)
SODIUM SERPL-SCNC: 135 MMOL/L (ref 136–145)
WBC # BLD AUTO: 2.3 K/MM3 (ref 4–10)

## 2019-11-05 RX ADMIN — ROSUVASTATIN CALCIUM SCH MG: 5 TABLET, FILM COATED ORAL at 21:11

## 2019-11-05 RX ADMIN — PANTOPRAZOLE SODIUM SCH MG: 40 TABLET, DELAYED RELEASE ORAL at 21:11

## 2019-11-05 RX ADMIN — CEFEPIME HYDROCHLORIDE SCH MLS/HR: 1 INJECTION, POWDER, FOR SOLUTION INTRAMUSCULAR; INTRAVENOUS at 10:07

## 2019-11-05 RX ADMIN — FUROSEMIDE SCH MG: 10 INJECTION, SOLUTION INTRAVENOUS at 06:06

## 2019-11-05 RX ADMIN — ACETAMINOPHEN PRN MG: 325 TABLET ORAL at 15:54

## 2019-11-05 RX ADMIN — DOXYCYCLINE SCH MLS/HR: 100 INJECTION, POWDER, LYOPHILIZED, FOR SOLUTION INTRAVENOUS at 11:09

## 2019-11-05 RX ADMIN — FUROSEMIDE SCH MG: 10 INJECTION, SOLUTION INTRAVENOUS at 13:44

## 2019-11-05 RX ADMIN — CEFEPIME HYDROCHLORIDE SCH MLS/HR: 1 INJECTION, POWDER, FOR SOLUTION INTRAMUSCULAR; INTRAVENOUS at 01:22

## 2019-11-05 RX ADMIN — DOXYCYCLINE HYCLATE SCH MG: 100 CAPSULE ORAL at 17:37

## 2019-11-05 RX ADMIN — AMLODIPINE BESYLATE SCH MG: 5 TABLET ORAL at 10:06

## 2019-11-05 RX ADMIN — CYANOCOBALAMIN TAB 1000 MCG SCH MCG: 1000 TAB at 10:06

## 2019-11-05 RX ADMIN — VALSARTAN SCH MG: 160 TABLET, FILM COATED ORAL at 10:07

## 2019-11-05 NOTE — PN
Progress Note, Physician


Chief Complaint: 





Sitting in chair at bedside. States he has poor appetite. Transfered from ICU 

to telemetry. Afebrile overnight. 


History of Present Illness: 





Patient is an 84 year old male with a significant past medical history of 

hypertension, hyperlipidemia, prosthetic aortic valve replacement (bovine). 

afib (on eliquis).   He presented to the [facility] ED with ataxia, chills, rigors and 

weakness.  patient had rigors at home and brought into the ED by his family for 

further evaluation. 








- Current Medication List


Current Medications: 


Active Medications





Acetaminophen (Tylenol -)  650 mg PO Q6H PRN


   PRN Reason: FEVER


Amlodipine Besylate (Norvasc -)  5 mg PO DAILY [facility]


Apixaban (Eliquis -)  5 mg PO BID [facility]


Cyanocobalamin (Vitamin B12 -)  1,000 mcg PO DAILY [facility]


Furosemide (Lasix Injection -)  40 mg IVPUSH BID@0600,1400 [facility]


   Last Admin: 11/05/19 06:06 Dose:  40 mg


Cefepime HCl 1 gm/ Dextrose  100 mls @ 200 mls/hr IVPB Q8H-IV LARISA; Protocol


   Last Admin: 11/05/19 01:22 Dose:  200 mls/hr


Doxycycline Hyclate 100 mg/ (Dextrose)  100 mls @ 100 mls/hr IVPB BID [facility]


   Last Admin: 11/04/19 22:52 Dose:  100 mls/hr


Metoprolol Succinate (Toprol Xl -)  25 mg PO DAILY [facility]


Rosuvastatin Calcium (Crestor -)  5 mg PO HS [facility]


   Last Admin: 11/04/19 22:51 Dose:  5 mg


Valsartan (Diovan -)  160 mg PO DAILY [facility]











- Objective


Vital Signs: 


 Vital Signs











Temperature  97.6 F   11/05/19 06:00


 


Pulse Rate  70   11/05/19 06:00


 


Respiratory Rate  20   11/05/19 06:00


 


Blood Pressure  150/82   11/05/19 06:00


 


O2 Sat by Pulse Oximetry (%)  95   11/04/19 22:00











Constitutional: Yes: Well Nourished, No Distress, Calm


Eyes: Yes: WNL, Conjunctiva Clear


HENT: Yes: WNL, Atraumatic, Normocephalic


Neck: Yes: WNL, Supple, Trachea Midline


Cardiovascular: Yes: WNL, Regular Rate and Rhythm


Respiratory: Yes: Diminished (at bases, scattered rales)


Gastrointestinal: Yes: WNL, Normal Bowel Sounds, Soft, Abdomen, Obese


...Rectal Exam: Yes: Deferred


Genitourinary: Yes: WNL


Breast(s): Yes: WNL


Musculoskeletal: Yes: WNL


Extremities: Yes: WNL


Edema: No


Peripheral Pulses WNL: Yes


Peripheral Pulses: Left Radial: 2+, Right Radial: 2+, Left Doralis Pedis: 2+, 

Right Dorsalis Pedis: 2+, Left Femoral: 2+, Right Femoral: 2+


Neurological: Yes: WNL, Alert, Oriented


...Motor Strength: WNL (generalized weakness)


Psychiatric: Yes: WNL


Labs: 


 CBC, BMP





 11/05/19 05:50 





 11/05/19 05:50 





 INR, PTT











INR  1.75  (0.83-1.09)  H  11/04/19  05:50    














Problem List





- Problems


(1) Abnormal liver enzymes


Assessment/Plan: 


LFTs trending up


US with hepatomegaly-fatty liver vs Hepatocellular dz


GI consulted


avoid hepatotoxic agents


Code(s): R74.8 - ABNORMAL LEVELS OF OTHER SERUM ENZYMES   





(2) Afib


Assessment/Plan: 


Rate controlled


c/w toprol


c/w eliquis


tele monitoring


Code(s): I48.91 - UNSPECIFIED ATRIAL FIBRILLATION   





(3) Altered mental status


Assessment/Plan: 


improved


fall precautions


Code(s): R41.82 - ALTERED MENTAL STATUS, UNSPECIFIED   





(4) Leukopenia


Assessment/Plan: 


WBC 2.3


neutropenia of unclear etiology,


viral panel ordered, rpr negative, blood smear negative


leukopenia may be due to acute infection, however, platelets are decreasing also


daily cbc 


heme consultation appreciated


patient may need granix if leukopenia worsens/persists.


Code(s): D72.819 - DECREASED WHITE BLOOD CELL COUNT, UNSPECIFIED   





(5) DIPTI (obstructive sleep apnea)


Assessment/Plan: 


c/w CPAP prn and at night


Code(s): G47.33 - OBSTRUCTIVE SLEEP APNEA (ADULT) (PEDIATRIC)   





(6) Prophylactic measure


Assessment/Plan: 


FEN


cardiac diet


no additional IVF needed


monitor electrolytes


NPO past MN for HARRIETT





DVT


c/w eliquis





Dispo


maintain as in patient


full code


discharge planning


Code(s): Z29.9 - ENCOUNTER FOR PROPHYLACTIC MEASURES, UNSPECIFIED   





(7) Severe sepsis


Assessment/Plan: 


presented with severe sepsis with neutropeni, fevers, malaise, and fatigue/

weakness.  


now noted with leukopenia and thrombocytopenia 


Fever of unknown origin, started on empiric antibiotics- cefepime and 

doxycycline-all cx NGTD


tick borne disease panel pending


chest ct shows mediastinal adenopaty with moderate increase since prior exam


CT with gall stones, no acute lb seen, spine CT does not explain source of 

fever


TTE with no vegetations, HARRIETT ordered to definitively r/o endocardsitis


Code(s): A41.9 - SEPSIS, UNSPECIFIED ORGANISM; R65.20 - SEVERE SEPSIS WITHOUT 

SEPTIC SHOCK   





(8) Thrombocytopenia


Assessment/Plan: 


plt ct 59


no signs of bleeding, will monitor


ESR/CRP/SANTOS/RF pending


appreciate hematology consultation





Code(s): D69.6 - THROMBOCYTOPENIA, UNSPECIFIED   





(9) Weakness


Assessment/Plan: 


c/w PT


fall precautions


Code(s): R53.1 - WEAKNESS   





Visit type





- Emergency Visit


Emergency Visit: Yes


ED Registration Date: 11/01/19


Care time: The patient presented to the Emergency Department on the above date 

and was hospitalized for further evaluation of their emergent condition.





- New Patient


This patient is new to me today: Yes


Date on this admission: 11/05/19





- Critical Care


Critical Care patient: No





- Discharge Referral


Referred to [facility] No
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
## 2019-11-05 NOTE — PN
Progress Note, Physician


History of Present Illness: 





stable


still confusion








- Current Medication List


Current Medications: 


Active Medications





Acetaminophen (Tylenol -)  650 mg PO Q6H PRN


   PRN Reason: FEVER


Amlodipine Besylate (Norvasc -)  5 mg PO DAILY [facility]


   Last Admin: 11/05/19 10:06 Dose:  5 mg


Apixaban (Eliquis -)  5 mg PO BID [facility]


Cyanocobalamin (Vitamin B12 -)  1,000 mcg PO DAILY [facility]


   Last Admin: 11/05/19 10:06 Dose:  1,000 mcg


Doxycycline Hyclate (Vibramycin -)  100 mg PO BID@1000,1800 [facility]


Furosemide (Lasix Injection -)  40 mg IVPUSH BID@0600,1400 [facility]


   Last Admin: 11/05/19 06:06 Dose:  40 mg


Metoprolol Succinate (Toprol Xl -)  25 mg PO DAILY [facility]


   Last Admin: 11/05/19 10:06 Dose:  25 mg


Rosuvastatin Calcium (Crestor -)  5 mg PO HS [facility]


   Last Admin: 11/04/19 22:51 Dose:  5 mg


Valsartan (Diovan -)  160 mg PO DAILY [facility]


   Last Admin: 11/05/19 10:07 Dose:  160 mg











- Objective


Vital Signs: 


 Vital Signs











Temperature  98.9 F   11/05/19 08:32


 


Pulse Rate  69   11/05/19 08:32


 


Respiratory Rate  18   11/05/19 08:32


 


Blood Pressure  154/82   11/05/19 08:32


 


O2 Sat by Pulse Oximetry (%)  95   11/05/19 09:00











Constitutional: Yes: No Distress, Calm


Cardiovascular: Yes: S1, S2


Respiratory: Yes: Regular, Poor Air Entry


Gastrointestinal: Yes: Normal Bowel Sounds, Soft


Musculoskeletal: Yes: WNL


Extremities: Yes: WNL


Neurological: Yes: Alert, Confusion


Psychiatric: Yes: Alert, Other


Labs: 


 CBC, BMP





 11/05/19 05:50 





 11/05/19 05:50 





 INR, PTT











INR  1.75  (0.83-1.09)  H  11/04/19  05:50    














Assessment/Plan








Problem List





- Problems


(1) Severe sepsis


Code(s): A41.9 - SEPSIS, UNSPECIFIED ORGANISM; R65.20 - SEVERE SEPSIS WITHOUT 

SEPTIC SHOCK   





(2) DIPTI (obstructive sleep apnea)


Code(s): G47.33 - OBSTRUCTIVE SLEEP APNEA (ADULT) (PEDIATRIC)   





(3) HLD (hyperlipidemia)


Code(s): E78.5 - HYPERLIPIDEMIA, UNSPECIFIED   





(4) HTN (hypertension)


Code(s): I10 - ESSENTIAL (PRIMARY) HYPERTENSION   





(5) S/P aortic valve replacement 


Code(s): Z95.2 - PRESENCE OF PROSTHETIC HEART VALVE   





(6) Afib


Code(s): I48.91 - UNSPECIFIED ATRIAL FIBRILLATION   





plan


patient deuce bandar tomorrow


will see what it shows


continue current mgmt


monitor wbc


rest as per the team

## 2019-11-05 NOTE — PN
Physical Exam: 


SUBJECTIVE: Patient seen and examined at bedside. 








OBJECTIVE:





 Vital Signs











 Period  Temp  Pulse  Resp  BP Sys/Rosales  Pulse Ox


 


 Last 24 Hr  97.6 F-102.9 F  59-88  18-20  134-154/51-92  95-95











GENERAL: The patient is awake, alert, and fully oriented, in no acute distress.


HEAD: Normal with no signs of trauma.


EYES: PERRL, extraocular movements intact, sclera anicteric, conjunctiva clear. 

No ptosis. 


NECK: Trachea midline, full range of motion, supple. 


LUNGS: Breath sounds equal, clear to auscultation bilaterally, no wheezes, no 

crackles, no 


accessory muscle use. 


HEART: Regular rate and irregular rhythm, S1, S2 without murmur, rub or gallop.


ABDOMEN: Soft, nontender, nondistended, normoactive bowel sounds, no guarding, 

no 


rebound, no hepatosplenomegaly, no masses.


EXTREMITIES: 2+ pulses, warm, well-perfused, no edema. 


SKIN: Warm, dry, normal turgor, no rashes or lesions noted














 Laboratory Results - last 24 hr











  11/02/19 11/02/19 11/04/19





  15:10 15:10 05:50


 


WBC   


 


RBC   


 


Hgb   


 


Hct   


 


MCV   


 


MCH   


 


MCHC   


 


RDW   


 


Plt Count   


 


MPV   


 


Absolute Neuts (auto)   


 


Neutrophils %   


 


Lymphocytes %   


 


Monocytes %   


 


Eosinophils %   


 


Basophils %   


 


Nucleated RBC %   


 


Platelet Estimate   


 


Sodium   


 


Potassium   


 


Chloride   


 


Carbon Dioxide   


 


Anion Gap   


 


BUN   


 


Creatinine   


 


Est GFR (CKD-EPI)AfAm   


 


Est GFR (CKD-EPI)NonAf   


 


Random Glucose   


 


Calcium   


 


Phosphorus   


 


Magnesium   


 


Iron    32 L


 


TIBC   


 


Iron Saturation   


 


Unsaturated IBC   


 


Total Bilirubin   


 


AST   


 


ALT   


 


Alkaline Phosphatase   


 


Total Protein   


 


Albumin   


 


Babesia microti IgG Ab  <1:10  


 


Babesia microti IgM Ab  <1:10  


 


Lyme Screen IgG & IgM  <0.91  


 


West Nile Virus IgG Ab   Negative 


 


West Nile Virus IgM Ab   Negative 














  11/04/19 11/04/19 11/05/19





  05:50 18:00 05:50


 


WBC   2.4 L  2.3 L


 


RBC   5.04  4.86


 


Hgb   14.7  14.4


 


Hct   44.7  42.2


 


MCV   88.7  86.9


 


MCH   29.3  29.6


 


MCHC   33.0  34.1


 


RDW   13.9  13.9


 


Plt Count   61 L  59 L


 


MPV   9.6  9.5


 


Absolute Neuts (auto)   1.5  1.4 L


 


Neutrophils %   64.5  60.2


 


Lymphocytes %   26.1  27.8


 


Monocytes %   7.6  9.0


 


Eosinophils %   1.3  2.3


 


Basophils %   0.5  0.7


 


Nucleated RBC %   0  0


 


Platelet Estimate   Decreased 


 


Sodium   


 


Potassium   


 


Chloride   


 


Carbon Dioxide   


 


Anion Gap   


 


BUN   


 


Creatinine   


 


Est GFR (CKD-EPI)AfAm   


 


Est GFR (CKD-EPI)NonAf   


 


Random Glucose   


 


Calcium   


 


Phosphorus   


 


Magnesium   


 


Iron  34 L  


 


TIBC  232 L  


 


Iron Saturation  14 L  


 


Unsaturated IBC  198 L  


 


Total Bilirubin   


 


AST   


 


ALT   


 


Alkaline Phosphatase   


 


Total Protein   


 


Albumin   


 


Babesia microti IgG Ab   


 


Babesia microti IgM Ab   


 


Lyme Screen IgG & IgM   


 


West Nile Virus IgG Ab   


 


West Nile Virus IgM Ab   














  11/05/19





  05:50


 


WBC 


 


RBC 


 


Hgb 


 


Hct 


 


MCV 


 


MCH 


 


MCHC 


 


RDW 


 


Plt Count 


 


MPV 


 


Absolute Neuts (auto) 


 


Neutrophils % 


 


Lymphocytes % 


 


Monocytes % 


 


Eosinophils % 


 


Basophils % 


 


Nucleated RBC % 


 


Platelet Estimate 


 


Sodium  135 L


 


Potassium  3.5


 


Chloride  99


 


Carbon Dioxide  30


 


Anion Gap  6 L


 


BUN  29.7 H


 


Creatinine  1.2


 


Est GFR (CKD-EPI)AfAm  64.88


 


Est GFR (CKD-EPI)NonAf  55.98


 


Random Glucose  119 H


 


Calcium  9.2


 


Phosphorus  2.3 L


 


Magnesium  2.1


 


Iron 


 


TIBC 


 


Iron Saturation 


 


Unsaturated IBC 


 


Total Bilirubin  2.6 H


 


AST  169 H


 


ALT  108 H


 


Alkaline Phosphatase  444 H


 


Total Protein  6.0 L


 


Albumin  2.9 L


 


Babesia microti IgG Ab 


 


Babesia microti IgM Ab 


 


Lyme Screen IgG & IgM 


 


West Nile Virus IgG Ab 


 


West Nile Virus IgM Ab 








Active Medications











Generic Name Dose Route Start Last Admin





  Trade Name Freq  PRN Reason Stop Dose Admin


 


Acetaminophen  650 mg  11/04/19 21:35  





  Tylenol -  PO   





  Q6H PRN   





  FEVER   





     





     





     


 


Amlodipine Besylate  5 mg  11/05/19 10:00  11/05/19 10:06





  Norvasc -  PO   5 mg





  DAILY [facility]   Administration





     





     





     





     


 


Apixaban  5 mg  11/04/19 22:00  





  Eliquis -  PO   





  BID LARISA   





     





     





     





     


 


Cyanocobalamin  1,000 mcg  11/05/19 10:00  11/05/19 10:06





  Vitamin B12 -  PO   1,000 mcg





  DAILY [facility]   Administration





     





     





     





     


 


Doxycycline Hyclate  100 mg  11/05/19 18:00  





  Vibramycin -  PO   





  BID@1000,1800 L[facility]   





     





     





     





     


 


Furosemide  40 mg  11/05/19 06:00  11/05/19 06:06





  Lasix Injection -  IVPUSH   40 mg





  BID@0600,1400 [facility]   Administration





     





     





     





     


 


Metoprolol Succinate  25 mg  11/05/19 10:00  11/05/19 10:06





  Toprol Xl -  PO   25 mg





  DAILY [facility]   Administration





     





     





     





     


 


Rosuvastatin Calcium  5 mg  11/04/19 22:00  11/04/19 22:51





  Crestor -  PO   5 mg





  HS [facility]   Administration





     





     





     





     


 


Valsartan  160 mg  11/05/19 10:00  11/05/19 10:07





  Diovan -  PO   160 mg





  DAILY [facility]   Administration





     





     





     





     











ASSESSMENT/PLAN:


Pt is an 83 y/o M with PMH HTN, HLD, Prosthetic Aortic Valve (Bovine), afib (on 

eliquis) who was admitted to ICU for close monitoring of Sepsis with leukopenia.





Thrombocytopenia/Leukopenia


-Likely 2/2 sepsis. ? HLH


-on abx


-ID on board


-LDH elevated at 398, elevated ferritin 1871, Fe 34, TIBC 232, Fe sat 14, INR 

1.75, , CD 25  pending


-May need BM Bx to r/o HLH


-Consider autoimmune cause. ESR, CRP, RF, SANTOS pending





Possible Infectious causes being worked up by ID/Primary team


-RPR, West Nile neg


-Babesia, Erlichia, Lyme, E. chafensis pending


-? HARRIETT to r/o Cx. neg endocarditis





?Hepatitis vs MODS 2/2 sepsis


-Liver enzymes increased dramatically from yest


-liver function decreased from prior with dropping alb and increasing INR


-Bili elevation


-notably, renal function not significantly worsened, which speaks against MODS 

as well as normal BP.














Visit type





- Emergency Visit


Emergency Visit: No





- New Patient


This patient is new to me today: No





- Critical Care


Critical Care patient: No





ATTENDING PHYSICIAN STATEMENT





I saw and evaluated the patient.


I reviewed the resident's note and discussed the case with the resident.


I agree with the resident's findings and plan as documented.








SUBJECTIVE:








OBJECTIVE:








ASSESSMENT AND PLAN:

## 2019-11-05 NOTE — PN
Progress Note (short form)





- Note


Progress Note: 





Mr. Campos was admitted with chills or rigors, cough and dyspnea  Known case 

of hypertension, hypertensive cardiovascular disease, severe left ventricular 

diastolic dysfunction, paroxysmal atrial fibrillation, chronic pulmonary disease

, status post  bioprosthetic aortic valve replacement.





Patient has been febrile no dyspnea was reported still has some residual 

cough.no chest pain or discomfort. To under go HARRIETT. 





84-year-old gentleman was in no acute distress, no pallor, cyanosis, clubbing 

or jaundice.


 


 Last Vital Signs











Temp Pulse Resp BP Pulse Ox


 


 101.6 F   62  20   152/64  97 


 


 11/05/19 11/04/19 18:00  11/04/19 18:00  11/04/19 18:00  11/04/19 09:00








NECK: Supple, no jugular venous distention, hepatojugular reflux was negative, 

carotids were 2+, unable to appreciate any bruits or thyromegaly.


HEART: PMI was not localized, no heaves or thrills, heart sounds were distant, 

ejection systolic murmur grade 2/6 was heard at the second right intercostal 

space ending in early to mid systole.  No diastolic murmur or gallops were 

heard.


LUNGS: decreased breath sounds at both bases.  No extraneous sounds were heard.


ABDOMEN: Soft, protuberant, nontender.  No pedal splenomegaly or palpable 

masses were felt.


EXTREMITIES: No calf tenderness or dependent edema, pulses were equal except 

posterior tibial pulses were not palpable.





 


 Impression:





1.  Recent chills, rigors cough and recurring fever, etiology is obscure.


a). Possibly related to acute bronchitis


b). Prosthetic valve endocarditis needs exclusion.


2. Hypertension, hypertensive cardiovascular disease.


3. Severe left ventricular diastolic dysfunction.


4. Chronic obstructive pulmonary disease.


5. Status post bioprosthetic aortic valve replacement.


6. Leukopenia and thrombocytopenia, probably related to sepsis.


7. abnorma;l LFTs.





Recommendations:


1.  Scheduled for HARRIETT.


2.  Continue medications as outlined.


3.  Being evaluated for cholangitis.


4.  Eliquis is being d/c'd because of severe thrombcytopenia.


4.  Further suggestions as necessary.





Prognosis: Guarded.

## 2019-11-05 NOTE — PN
Progress Note (short form)





- Note


Progress Note: 





Patient seen and examined





No specific omplaints


Fevers, chills





 


AFVSS


Cor: RSR, No murmurs, No gallops


Lungs: Clear to P&A


Abd: Soft, Normal bowel sounds, No organomegaly


Ext:No significant edema








Labs/Meds reviewed








A/P





83 y/o M with PMH HTN, HLD, Prosthetic Aortic Valve (Bovine), afib (on eliquis) 

who was admitted for close monitoring of fevers with leukopenia.





Thrombocytopenia/Leukopenia/abnormal LFTs


-Likely 2/2 occult infection ? culture negative endocarditis  ? viral


-r/o autoimmune/lymphoproliferative causes. HAS mediastinal adenopathy uptpo 

3.4cm on CT chest


-LDH elevated at 398, elevated ferritin 1871, Fe 34, TIBC 232, Fe sat 14, INR 

1.75, , CD 25  pending


-check flowcytometry/ FISH/cytogenetics


-check sCD25 level


-check ESR/CRP/SANTOS/RF


- B12/TSH --normal





will follow

## 2019-11-05 NOTE — PN
Progress Note, Physician


History of Present Illness: 





PULMONARY





AWAKE,ALERT,COMFORTABLE AT REST ,+HERNANDEZ,-CP,-COUGH





- Current Medication List


Current Medications: 


Active Medications





Acetaminophen (Tylenol -)  650 mg PO Q6H PRN


   PRN Reason: FEVER


Amlodipine Besylate (Norvasc -)  5 mg PO DAILY [facility]


   Last Admin: 11/05/19 10:06 Dose:  5 mg


Apixaban (Eliquis -)  5 mg PO BID [facility]


Cyanocobalamin (Vitamin B12 -)  1,000 mcg PO DAILY [facility]


   Last Admin: 11/05/19 10:06 Dose:  1,000 mcg


Furosemide (Lasix Injection -)  40 mg IVPUSH BID@0600,1400 [facility]


   Last Admin: 11/05/19 06:06 Dose:  40 mg


Cefepime HCl 1 gm/ Dextrose  100 mls @ 200 mls/hr IVPB Q8H-IV [facility]; Protocol


   Last Admin: 11/05/19 10:07 Dose:  200 mls/hr


Doxycycline Hyclate 100 mg/ (Dextrose)  100 mls @ 100 mls/hr IVPB BID [facility]


   Last Admin: 11/05/19 11:09 Dose:  100 mls/hr


Metoprolol Succinate (Toprol Xl -)  25 mg PO DAILY [facility]


   Last Admin: 11/05/19 10:06 Dose:  25 mg


Rosuvastatin Calcium (Crestor -)  5 mg PO HS [facility]


   Last Admin: 11/04/19 22:51 Dose:  5 mg


Valsartan (Diovan -)  160 mg PO DAILY [facility]


   Last Admin: 11/05/19 10:07 Dose:  160 mg











- Objective


Vital Signs: 


 Vital Signs











Temperature  98.9 F   11/05/19 08:32


 


Pulse Rate  69   11/05/19 08:32


 


Respiratory Rate  18   11/05/19 08:32


 


Blood Pressure  154/82   11/05/19 08:32


 


O2 Sat by Pulse Oximetry (%)  95   11/04/19 22:00











Constitutional: Yes: Well Nourished, Calm


Eyes: Yes: WNL


HENT: Yes: WNL


Neck: Yes: WNL


Cardiovascular: Yes: Pulse Irregular, S1, S2


Respiratory: Yes: Diminished


Gastrointestinal: Yes: Normal Bowel Sounds, Soft


Extremities: Yes: WNL


Edema: No


Labs: 


 CBC, BMP





 11/05/19 05:50 





 11/05/19 05:50 





 INR, PTT











INR  1.75  (0.83-1.09)  H  11/04/19  05:50    














Problem List





- Problems


(1) Altered mental status


Code(s): R41.82 - ALTERED MENTAL STATUS, UNSPECIFIED   





(2) Abnormal liver enzymes


Code(s): R74.8 - ABNORMAL LEVELS OF OTHER SERUM ENZYMES   





(3) Afib


Code(s): I48.91 - UNSPECIFIED ATRIAL FIBRILLATION   





(4) Leukopenia


Code(s): D72.819 - DECREASED WHITE BLOOD CELL COUNT, UNSPECIFIED   





(5) DIPTI (obstructive sleep apnea)


Code(s): G47.33 - OBSTRUCTIVE SLEEP APNEA (ADULT) (PEDIATRIC)   





(6) Thrombocytopenia


Code(s): D69.6 - THROMBOCYTOPENIA, UNSPECIFIED   





(7) Weakness


Code(s): R53.1 - WEAKNESS   





(8) HLD (hyperlipidemia)


Code(s): E78.5 - HYPERLIPIDEMIA, UNSPECIFIED   





(9) HTN (hypertension)


Code(s): I10 - ESSENTIAL (PRIMARY) HYPERTENSION   





(10) S/P aortic valve replacement


Code(s): Z95.2 - PRESENCE OF PROSTHETIC HEART VALVE   





Assessment/Plan








ASSESSMENT:


Fever of unknown origin with altered mental status


Toxic Metabolic Encephalopathy 


Low clinical suspicion of Meningitis 


Sepsis


Leukopenia


AF on chronic AC


AS s/p porcine valve 2009


HTN


CHF


DIPTI


COPD


CKD


ELEVATED LFTS


MEDIASTINAL ADENOPATHY





PLAN:


-Abx per ID


-Lasix


-BD TX PRN 


-CPAP at night and when sleeping


-Oxygen 


-Rate control


-Will hold LP for now 


-HARRIETT (? Culture negative Endocarditis) 


-Monitor LFTS,cbc


- F/U CHEST CT outpatient 








DR HAGEN

## 2019-11-05 NOTE — PN
Progress Note, SLP





- Note


Progress Note: 





 Selected Entries











  11/04/19 11/04/19 11/04/19





  02:05 06:00 10:00


 


Breakfast   50%


 


Supper   


 


Temperature 102 F H 98.7 F 98.9 F














  11/04/19 11/04/19 11/04/19





  13:30 15:00 16:48


 


Breakfast   


 


Supper   


 


Temperature 100.6 F H 102.9 F H 97.8 F














  11/04/19 11/04/19 11/05/19





  22:00 23:02 06:00


 


Breakfast   


 


Supper  50% 


 


Temperature 98.2 F  97.6 F














  11/05/19





  08:32


 


Breakfast 


 


Supper 


 


Temperature 98.9 F








 Laboratory Tests











  11/05/19





  05:50


 


WBC  2.3 L








On Reg/thin liquid. Tolerating diet but limited appetite.

## 2019-11-06 LAB
ALBUMIN SERPL-MCNC: 2.8 G/DL (ref 3.4–5)
ALP SERPL-CCNC: 473 U/L (ref 45–117)
ALT SERPL-CCNC: 182 U/L (ref 13–61)
AMYLASE SERPL-CCNC: 75 U/L (ref 25–115)
ANION GAP SERPL CALC-SCNC: 7 MMOL/L (ref 8–16)
APPEARANCE UR: CLEAR
AST SERPL-CCNC: 281 U/L (ref 15–37)
BASOPHILS # BLD: 0.6 % (ref 0–2)
BILIRUB CONJ SERPL-MCNC: 1.9 MG/DL (ref 0–0.2)
BILIRUB DIRECT SERPL-MCNC: 382 U/L (ref 87–246)
BILIRUB SERPL-MCNC: 2.6 MG/DL (ref 0.2–1)
BILIRUB UR STRIP.AUTO-MCNC: NEGATIVE MG/DL
BUN SERPL-MCNC: 38.4 MG/DL (ref 7–18)
CALCIUM SERPL-MCNC: 9.4 MG/DL (ref 8.5–10.1)
CHLORIDE SERPL-SCNC: 99 MMOL/L (ref 98–107)
CO2 SERPL-SCNC: 31 MMOL/L (ref 21–32)
COLOR UR: (no result)
CREAT SERPL-MCNC: 1.4 MG/DL (ref 0.55–1.3)
DEPRECATED RDW RBC AUTO: 13.5 % (ref 11.9–15.9)
EOSINOPHIL # BLD: 5.6 % (ref 0–4.5)
GGT SERPL-CCNC: 289 U/L (ref 5–85)
GLUCOSE SERPL-MCNC: 124 MG/DL (ref 74–106)
HCT VFR BLD CALC: 43.6 % (ref 35.4–49)
HGB BLD-MCNC: 14.7 GM/DL (ref 11.7–16.9)
INR BLD: 1.13 (ref 0.83–1.09)
KETONES UR QL STRIP: NEGATIVE
LEUKOCYTE ESTERASE UR QL STRIP.AUTO: NEGATIVE
LIPASE SERPL-CCNC: 252 U/L (ref 73–393)
LYMPHOCYTES # BLD: 16.2 % (ref 8–40)
MAGNESIUM SERPL-MCNC: 2.2 MG/DL (ref 1.8–2.4)
MCH RBC QN AUTO: 29.5 PG (ref 25.7–33.7)
MCHC RBC AUTO-ENTMCNC: 33.8 G/DL (ref 32–35.9)
MCV RBC: 87.3 FL (ref 80–96)
MONOCYTES # BLD AUTO: 9 % (ref 3.8–10.2)
NEUTROPHILS # BLD: 68.6 % (ref 42.8–82.8)
NITRITE UR QL STRIP: NEGATIVE
PH UR: 5 [PH] (ref 5–8)
PLATELET # BLD AUTO: 57 K/MM3 (ref 134–434)
PMV BLD: 10 FL (ref 7.5–11.1)
POTASSIUM SERPLBLD-SCNC: 3.1 MMOL/L (ref 3.5–5.1)
PROT SERPL-MCNC: 6 G/DL (ref 6.4–8.2)
PROT UR QL STRIP: (no result)
PROT UR QL STRIP: NEGATIVE
PT PNL PPP: 13.3 SEC (ref 9.7–13)
RBC # BLD AUTO: 4.99 M/MM3 (ref 4–5.6)
SODIUM SERPL-SCNC: 137 MMOL/L (ref 136–145)
SP GR UR: 1.01 (ref 1.01–1.03)
UROBILINOGEN UR STRIP-MCNC: 1 MG/DL (ref 0.2–1)
WBC # BLD AUTO: 2.7 K/MM3 (ref 4–10)

## 2019-11-06 PROCEDURE — B246ZZ4 ULTRASONOGRAPHY OF RIGHT AND LEFT HEART, TRANSESOPHAGEAL: ICD-10-PCS | Performed by: INTERNAL MEDICINE

## 2019-11-06 RX ADMIN — PANTOPRAZOLE SODIUM SCH MG: 40 TABLET, DELAYED RELEASE ORAL at 21:25

## 2019-11-06 RX ADMIN — FUROSEMIDE SCH MG: 10 INJECTION, SOLUTION INTRAVENOUS at 13:49

## 2019-11-06 RX ADMIN — PANTOPRAZOLE SODIUM SCH MG: 40 TABLET, DELAYED RELEASE ORAL at 09:15

## 2019-11-06 RX ADMIN — CYANOCOBALAMIN TAB 1000 MCG SCH MCG: 1000 TAB at 09:15

## 2019-11-06 RX ADMIN — VALSARTAN SCH MG: 160 TABLET, FILM COATED ORAL at 09:14

## 2019-11-06 RX ADMIN — ACETAMINOPHEN PRN MG: 325 TABLET ORAL at 21:25

## 2019-11-06 RX ADMIN — DOXYCYCLINE HYCLATE SCH MG: 100 CAPSULE ORAL at 09:15

## 2019-11-06 RX ADMIN — ROSUVASTATIN CALCIUM SCH MG: 5 TABLET, FILM COATED ORAL at 21:25

## 2019-11-06 RX ADMIN — FUROSEMIDE SCH MG: 10 INJECTION, SOLUTION INTRAVENOUS at 06:29

## 2019-11-06 RX ADMIN — AMLODIPINE BESYLATE SCH MG: 5 TABLET ORAL at 09:15

## 2019-11-06 NOTE — PN
Teaching Attending Note


Name of Resident: Simon Ross





ATTENDING PHYSICIAN STATEMENT





I saw and evaluated the patient.


I reviewed the resident's note and discussed the case with the resident.


I agree with the resident's findings and plan as documented.








SUBJECTIVE:


Patient seen and examined 


Course reviewed 


Temps down 


Remains neutropenic and thrombocytopenic 


HARRIETT reportedly non revealing 


 Last Vital Signs











Temp Pulse Resp BP Pulse Ox


 


 99.7 F H  69   18   137/84   100 


 


 11/06/19 18:00  11/06/19 18:00  11/06/19 18:00  11/06/19 18:00  11/06/19 16:15











HEENT: FLORENTINO, EOM Intact


Oropharynx: No thrush, No mucositis


Neck: Supple


Nodes: Without adenopathy


Cor: irregular rhythm 


Lungs: Clear to P&A


Abd: Soft, Normal bowel sounds, No organomegaly, abdominal mass left upper 

quadrant


testes descende, circumcised  


Ext:No significant edema


Skin: No rashes, Integument intact


 CBC, BMP





 11/06/19 00:50 





 11/06/19 00:50 





 Current Medications











Generic Name Dose Route Start Last Admin





  Trade Name Freq  PRN Reason Stop Dose Admin


 


Acetaminophen  650 mg  11/04/19 21:35  11/05/19 15:54





  Tylenol -  PO   650 mg





  Q6H PRN   Administration





  FEVER   





     





     





     


 


Amlodipine Besylate  5 mg  11/05/19 10:00  11/06/19 09:15





  Norvasc -  PO   5 mg





  DAILY [facility]   Administration





     





     





     





     


 


Cyanocobalamin  1,000 mcg  11/05/19 10:00  11/06/19 09:15





  Vitamin B12 -  PO   1,000 mcg





  DAILY [facility]   Administration





     





     





     





     


 


Furosemide  40 mg  11/05/19 06:00  11/06/19 13:49





  Lasix Injection -  IVPUSH   40 mg





  BID@0600,1400 [facility]   Administration





     





     





     





     


 


Metoprolol Succinate  25 mg  11/05/19 10:00  11/06/19 09:15





  Toprol Xl -  PO   25 mg





  DAILY [facility]   Administration





     





     





     





     


 


Pantoprazole Sodium  40 mg  11/05/19 22:00  11/06/19 09:15





  Protonix -  PO   40 mg





  BID LARISA   Administration





     





     





     





     


 


Rosuvastatin Calcium  5 mg  11/04/19 22:00  11/05/19 21:11





  Crestor -  PO   5 mg





  HS LARISA   Administration





     





     





     





     


 


Valsartan  160 mg  11/05/19 10:00  11/06/19 09:14





  Diovan -  PO   160 mg





  DAILY [facility]   Administration





     





     





     





     











Impression:





Picture suggestive of sepsis  with rigors /chills occuring one day post tunnel 

exposure 


Neutropenia/ thombocytopenia may be secondary to same 


Elevated ferritin  raises ? of hemophagocytos lymphohistiocytosis syndrome - 

autoimmune studies pending 


Has TATIANA abdominal  mass on exam - suggest repeat abdominal sono








OBJECTIVE:








ASSESSMENT AND PLAN:

## 2019-11-06 NOTE — PN
Progress Note, Physician


Chief Complaint: 





Resting in bed. Appears confused today. Repeating same questions over. 


History of Present Illness: 





Patient is an 84 year old male with a significant past medical history of 

hypertension, hyperlipidemia, prosthetic aortic valve replacement (bovine). 

afib (on eliquis).   He presented to the [facility] ED with ataxia, chills, rigors and 

weakness.  patient had rigors at home and brought into the ED by his family for 

further evaluation. 








- Current Medication List


Current Medications: 


Active Medications





Acetaminophen (Tylenol -)  650 mg PO Q6H PRN


   PRN Reason: FEVER


   Last Admin: 11/05/19 15:54 Dose:  650 mg


Amlodipine Besylate (Norvasc -)  5 mg PO DAILY [facility]


   Last Admin: 11/05/19 10:06 Dose:  5 mg


Cyanocobalamin (Vitamin B12 -)  1,000 mcg PO DAILY [facility]


   Last Admin: 11/05/19 10:06 Dose:  1,000 mcg


Doxycycline Hyclate (Vibramycin -)  100 mg PO BID@1000,1800 [facility]


   Last Admin: 11/05/19 17:37 Dose:  100 mg


Furosemide (Lasix Injection -)  40 mg IVPUSH BID@0600,1400 [facility]


   Last Admin: 11/06/19 06:29 Dose:  40 mg


Metoprolol Succinate (Toprol Xl -)  25 mg PO DAILY [facility]


   Last Admin: 11/05/19 10:06 Dose:  25 mg


Pantoprazole Sodium (Protonix -)  40 mg PO BID [facility]


   Last Admin: 11/05/19 21:11 Dose:  40 mg


Potassium Chloride (K-Dur -)  40 meq PO ONCE ONE


   Stop: 11/06/19 07:23


Potassium Chloride (K-Dur -)  40 meq PO ONCE ONE


   Stop: 11/06/19 11:01


Rosuvastatin Calcium (Crestor -)  5 mg PO HS [facility]


   Last Admin: 11/05/19 21:11 Dose:  5 mg


Valsartan (Diovan -)  160 mg PO DAILY [facility]


   Last Admin: 11/05/19 10:07 Dose:  160 mg











- Objective


Vital Signs: 


 Vital Signs











Temperature  99.1 F   11/06/19 06:00


 


Pulse Rate  64   11/06/19 06:00


 


Respiratory Rate  18   11/06/19 06:00


 


Blood Pressure  130/71   11/06/19 06:00


 


O2 Sat by Pulse Oximetry (%)  97   11/05/19 21:00











Additional Findings/Remarks: 





Constitutional: Yes: Well Nourished, No Distress, Calm


Eyes: Yes: WNL, Conjunctiva Clear


HENT: Yes: WNL, Atraumatic, Normocephalic


Neck: Yes: WNL, Supple, Trachea Midline


Cardiovascular: Yes: WNL, Regular Rate and Rhythm


Respiratory: Yes: Diminished (at bases, scattered rales)


Gastrointestinal: Yes: WNL, Normal Bowel Sounds, Soft, Abdomen, Obese


...Rectal Exam: Yes: Deferred


Genitourinary: Yes: WNL


Breast(s): Yes: WNL


Musculoskeletal: Yes: WNL


Extremities: Yes: WNL


Edema: No


Peripheral Pulses WNL: Yes


Peripheral Pulses: Left Radial: 2+, Right Radial: 2+, Left Doralis Pedis: 2+, 

Right Dorsalis Pedis: 2+, Left Femoral: 2+, Right Femoral: 2+


Neurological: Yes: Confused asking same questions over. 


...Motor Strength: WNL (generalized weakness)


Psychiatric: Yes: WNL


Labs: 


Labs: 


 CBC, BMP





 11/06/19 00:50 





 11/06/19 00:50 





 INR, PTT











INR  1.13  (0.83-1.09)  H  11/06/19  00:50    














- ....Imaging


Other: Report Reviewed (TTE. HARRIETT pending)





Problem List





- Problems


(1) Abnormal liver enzymes


Assessment/Plan: 


LFTs continue to be trending up


US with hepatomegaly-fatty liver vs Hepatocellular 


avoid hepatotoxic agents


MRCP done last night revealing no CBD stones, GB stone but no cholecystitis. No 

need for ERCP


Code(s): R74.8 - ABNORMAL LEVELS OF OTHER SERUM ENZYMES   





(2) Afib


Assessment/Plan: 


Rate controlled


c/w toprol


Eliquis stopped gievn throbocytopenia


tele monitoring


Code(s): I48.91 - UNSPECIFIED ATRIAL FIBRILLATION   





(3) Altered mental status


Assessment/Plan: 


confused this morning


able to redirect


attempt to maintain sleep wake cycle


fall precautions


Code(s): R41.82 - ALTERED MENTAL STATUS, UNSPECIFIED   





(4) Leukopenia


Assessment/Plan: 


WBC 2.t


neutropenia of unclear etiology,


viral panel ordered, rpr negative, blood smear negative


leukopenia may be due to acute infection, however, platelets are decreasing 

also. All Cx are NGTD but still spiking daily fevers. T max 101


daily cbc 


heme consultation appreciated


patient may need granix if leukopenia worsens/persists.


Code(s): D72.819 - DECREASED WHITE BLOOD CELL COUNT, UNSPECIFIED   





(5) DIPTI (obstructive sleep apnea)


Assessment/Plan: 


c/w CPAP prn and at night


Code(s): G47.33 - OBSTRUCTIVE SLEEP APNEA (ADULT) (PEDIATRIC)   





(6) Prophylactic measure


Assessment/Plan: 


FEN


cardiac diet


no additional IVF needed


monitor electrolytes


NPO past MN for HARRIETT





DVT


eliquis stopped





Dispo


maintain as in patient


full code


discharge planning


Code(s): Z29.9 - ENCOUNTER FOR PROPHYLACTIC MEASURES, UNSPECIFIED   





(7) Severe sepsis


Assessment/Plan: 


presented with severe sepsis with neutropenia, fevers, malaise, and fatigue/

weakness.  


now noted with leukopenia and thrombocytopenia 


Fever of unknown origin, cefepime and doxycycline stopped as per ID-all cx NGTD


tick borne disease panel pending possible viral etiology. 


chest ct shows mediastinal adenopaty with moderate increase since prior exam


CT with gall stones, no acute lb seen, spine CT does not explain source of 

fever


TTE with no vegetations, HARRIETT pending to definitively r/o endocardsitis


Code(s): A41.9 - SEPSIS, UNSPECIFIED ORGANISM; R65.20 - SEVERE SEPSIS WITHOUT 

SEPTIC SHOCK   





(8) Thrombocytopenia


Assessment/Plan: 


plt ct 57


no signs of bleeding, will monitor


ESR/CRP/SANTOS/RF pending


eliquis stopped


appreciate hematology consultation





Code(s): D69.6 - THROMBOCYTOPENIA, UNSPECIFIED   





(9) Weakness


Assessment/Plan: 


c/w PT


fall precautions


Code(s): R53.1 - WEAKNESS   





Visit type





- Emergency Visit


Emergency Visit: Yes


ED Registration Date: 11/01/19


Care time: The patient presented to the Emergency Department on the above date 

and was hospitalized for further evaluation of their emergent condition.





- New Patient


This patient is new to me today: No





- Critical Care


Critical Care patient: No





- Discharge Referral


Referred to [facility] No

## 2019-11-06 NOTE — PN
Progress Note, Physician


History of Present Illness: 





patient stable


no new issues


still spiking fevers


all results negative so far


wbc has started to trend towards normal


plan to r/o endocarditis


patient for bandar today





- Current Medication List


Current Medications: 


Active Medications





Acetaminophen (Tylenol -)  650 mg PO Q6H PRN


   PRN Reason: FEVER


   Last Admin: 11/05/19 15:54 Dose:  650 mg


Amlodipine Besylate (Norvasc -)  5 mg PO DAILY [facility]


   Last Admin: 11/06/19 09:15 Dose:  5 mg


Cyanocobalamin (Vitamin B12 -)  1,000 mcg PO DAILY [facility]


   Last Admin: 11/06/19 09:15 Dose:  1,000 mcg


Furosemide (Lasix Injection -)  40 mg IVPUSH BID@0600,1400 [facility]


   Last Admin: 11/06/19 06:29 Dose:  40 mg


Metoprolol Succinate (Toprol Xl -)  25 mg PO DAILY [facility]


   Last Admin: 11/06/19 09:15 Dose:  25 mg


Pantoprazole Sodium (Protonix -)  40 mg PO BID [facility]


   Last Admin: 11/06/19 09:15 Dose:  40 mg


Rosuvastatin Calcium (Crestor -)  5 mg PO HS [facility]


   Last Admin: 11/05/19 21:11 Dose:  5 mg


Valsartan (Diovan -)  160 mg PO DAILY [facility]


   Last Admin: 11/06/19 09:14 Dose:  160 mg











- Objective


Vital Signs: 


 Vital Signs











Temperature  98.7 F   11/06/19 09:31


 


Pulse Rate  72   11/06/19 08:59


 


Respiratory Rate  18   11/06/19 08:59


 


Blood Pressure  130/79   11/06/19 08:59


 


O2 Sat by Pulse Oximetry (%)  95   11/06/19 09:02











Constitutional: Yes: No Distress, Calm


Cardiovascular: Yes: Murmur, S1, S2


Respiratory: Yes: Regular, CTA Bilaterally


Musculoskeletal: Yes: WNL


Extremities: Yes: WNL


Neurological: Yes: Alert, Oriented


Psychiatric: Yes: Alert, Oriented


Labs: 


 CBC, BMP





 11/06/19 00:50 





 11/06/19 00:50 





 INR, PTT











INR  1.13  (0.83-1.09)  H  11/06/19  00:50    


 


Fibrinogen  281.0 mg/dL (238-498)   11/06/19  00:50    














Assessment/Plan








Problem List





- Problems


(1) Severe sepsis


Code(s): A41.9 - SEPSIS, UNSPECIFIED ORGANISM; R65.20 - SEVERE SEPSIS WITHOUT 

SEPTIC SHOCK   





(2) DIPTI (obstructive sleep apnea)


Code(s): G47.33 - OBSTRUCTIVE SLEEP APNEA (ADULT) (PEDIATRIC)   





(3) HLD (hyperlipidemia)


Code(s): E78.5 - HYPERLIPIDEMIA, UNSPECIFIED   





(4) HTN (hypertension)


Code(s): I10 - ESSENTIAL (PRIMARY) HYPERTENSION   





(5) S/P aortic valve replacement 


Code(s): Z95.2 - PRESENCE OF PROSTHETIC HEART VALVE   





(6) Afib


Code(s): I48.91 - UNSPECIFIED ATRIAL FIBRILLATION   





plan


i think i am leaning towards viral infection


will see what bandar shows


will stop doxy


rest as per the team

## 2019-11-06 NOTE — PN
Physical Exam: 


SUBJECTIVE: Patient seen and examined at bedside








OBJECTIVE:





 Vital Signs











 Period  Temp  Pulse  Resp  BP Sys/Rosales  Pulse Ox


 


 Last 24 Hr  98.2 F-101.6 F  62-76  16-18  130-156/64-91  95-97











GENERAL: The patient is awake, alert, and fully oriented, in no acute distress.


HEAD: Normal with no signs of trauma.


EYES: extraocular movements intact, sclera anicteric, conjunctiva clear. No 

ptosis. 


NECK: Trachea midline, full range of motion, supple. 


LUNGS: Breath sounds equal, clear to auscultation bilaterally, no wheezes, no 

crackles, no 


accessory muscle use. 


HEART: Regular rate and irregular rhythm, S1, S2 without murmur, rub or gallop.


ABDOMEN: Soft, nontender, nondistended, normoactive bowel sounds, no guarding, 

no 


rebound, no hepatosplenomegaly, no masses.


EXTREMITIES: 2+ pulses, warm, well-perfused, no edema. 

















 Laboratory Results - last 24 hr











  11/03/19 11/06/19 11/06/19





  05:45 00:50 00:50


 


WBC   2.7 L 


 


RBC   4.99 


 


Hgb   14.7 


 


Hct   43.6 


 


MCV   87.3 


 


MCH   29.5 


 


MCHC   33.8 


 


RDW   13.5 


 


Plt Count   57 L 


 


MPV   10.0 


 


Absolute Neuts (auto)   1.9 


 


Neutrophils %   68.6 


 


Lymphocytes %   16.2  D 


 


Monocytes %   9.0 


 


Eosinophils %   5.6 H D 


 


Basophils %   0.6 


 


Nucleated RBC %   0 


 


ESR   


 


PT with INR   


 


INR   


 


Fibrinogen   


 


Sodium    137


 


Potassium    3.1 L


 


Chloride    99


 


Carbon Dioxide    31


 


Anion Gap    7 L


 


BUN    38.4 H


 


Creatinine    1.4 H


 


Est GFR (CKD-EPI)AfAm    53.85


 


Est GFR (CKD-EPI)NonAf    46.46


 


Random Glucose    124 H


 


Calcium    9.4


 


Magnesium    2.2


 


Total Bilirubin    2.6 H


 


Direct Bilirubin   


 


GGT   


 


AST    281 H


 


ALT    182 H


 


Alkaline Phosphatase    473 H


 


Ammonia   


 


LD Total   


 


C-Reactive Protein   


 


Total Protein    6.0 L


 


Albumin    2.8 L


 


Total Amylase   


 


Lipase   


 


Ehrlichia IgG Antibody  Negative  


 


Ehrlichia IgM Antibody  Negative  


 


E. chaffeensis IgG Ab  Negative  


 


E. chaffeensis IgM Ab  Negative  














  11/06/19 11/06/19 11/06/19





  00:50 00:50 00:50


 


WBC   


 


RBC   


 


Hgb   


 


Hct   


 


MCV   


 


MCH   


 


MCHC   


 


RDW   


 


Plt Count   


 


MPV   


 


Absolute Neuts (auto)   


 


Neutrophils %   


 


Lymphocytes %   


 


Monocytes %   


 


Eosinophils %   


 


Basophils %   


 


Nucleated RBC %   


 


ESR   6 


 


PT with INR    13.30 H


 


INR    1.13 H


 


Fibrinogen   


 


Sodium   


 


Potassium   


 


Chloride   


 


Carbon Dioxide   


 


Anion Gap   


 


BUN   


 


Creatinine   


 


Est GFR (CKD-EPI)AfAm   


 


Est GFR (CKD-EPI)NonAf   


 


Random Glucose   


 


Calcium   


 


Magnesium   


 


Total Bilirubin   


 


Direct Bilirubin   


 


GGT   


 


AST   


 


ALT   


 


Alkaline Phosphatase   


 


Ammonia   


 


LD Total  382 H  


 


C-Reactive Protein  2.1 H  


 


Total Protein   


 


Albumin   


 


Total Amylase   


 


Lipase   


 


Ehrlichia IgG Antibody   


 


Ehrlichia IgM Antibody   


 


E. chaffeensis IgG Ab   


 


E. chaffeensis IgM Ab   














  11/06/19 11/06/19 11/06/19





  00:50 00:50 05:50


 


WBC   


 


RBC   


 


Hgb   


 


Hct   


 


MCV   


 


MCH   


 


MCHC   


 


RDW   


 


Plt Count   


 


MPV   


 


Absolute Neuts (auto)   


 


Neutrophils %   


 


Lymphocytes %   


 


Monocytes %   


 


Eosinophils %   


 


Basophils %   


 


Nucleated RBC %   


 


ESR   


 


PT with INR   


 


INR   


 


Fibrinogen  281.0  


 


Sodium   


 


Potassium   


 


Chloride   


 


Carbon Dioxide   


 


Anion Gap   


 


BUN   


 


Creatinine   


 


Est GFR (CKD-EPI)AfAm   


 


Est GFR (CKD-EPI)NonAf   


 


Random Glucose   


 


Calcium   


 


Magnesium   


 


Total Bilirubin   


 


Direct Bilirubin   1.9 H 


 


GGT   289 H 


 


AST   


 


ALT   


 


Alkaline Phosphatase   


 


Ammonia    < 10.00 L


 


LD Total   


 


C-Reactive Protein   


 


Total Protein   


 


Albumin   


 


Total Amylase   75 


 


Lipase   252 


 


Ehrlichia IgG Antibody   


 


Ehrlichia IgM Antibody   


 


E. chaffeensis IgG Ab   


 


E. chaffeensis IgM Ab   








Active Medications











Generic Name Dose Route Start Last Admin





  Trade Name Freq  PRN Reason Stop Dose Admin


 


Acetaminophen  650 mg  11/04/19 21:35  11/05/19 15:54





  Tylenol -  PO   650 mg





  Q6H PRN   Administration





  FEVER   





     





     





     


 


Amlodipine Besylate  5 mg  11/05/19 10:00  11/06/19 09:15





  Norvasc -  PO   5 mg





  DAILY LARISA   Administration





     





     





     





     


 


Cyanocobalamin  1,000 mcg  11/05/19 10:00  11/06/19 09:15





  Vitamin B12 -  PO   1,000 mcg





  DAILY LARISA   Administration





     





     





     





     


 


Furosemide  40 mg  11/05/19 06:00  11/06/19 13:49





  Lasix Injection -  IVPUSH   40 mg





  BID@0600,1400 LARISA   Administration





     





     





     





     


 


Metoprolol Succinate  25 mg  11/05/19 10:00  11/06/19 09:15





  Toprol Xl -  PO   25 mg





  DAILY LARISA   Administration





     





     





     





     


 


Pantoprazole Sodium  40 mg  11/05/19 22:00  11/06/19 09:15





  Protonix -  PO   40 mg





  BID LARISA   Administration





     





     





     





     


 


Rosuvastatin Calcium  5 mg  11/04/19 22:00  11/05/19 21:11





  Crestor -  PO   5 mg





  HS LARISA   Administration





     





     





     





     


 


Valsartan  160 mg  11/05/19 10:00  11/06/19 09:14





  Diovan -  PO   160 mg





  DAILY LARISA   Administration





     





     





     





     











ASSESSMENT/PLAN:


Pt is an 83 y/o M with PMH HTN, HLD, Prosthetic Aortic Valve (Bovine), afib (on 

eliquis) who was admitted to ICU for close monitoring of Sepsis with 

leukopenia. Now transferred to the floor. Heme/Onc consulted for 

thrombocytopenia/leukopenia.





Thrombocytopenia/Leukopenia


-Likely 2/2 sepsis. ? HLH


-on abx


-ID on board


-LDH elevated at 398, elevated ferritin 1871, Fe 34, TIBC 232, Fe sat 14, INR 

1.75, , CD 25  pending


-May need BM Bx to r/o HLH


-Consider autoimmune cause. ESR, CRP, RF, SANTOS pending


-holding Plavix





Possible Infectious causes being worked up by ID/Primary team


-RPR, West Nile neg


-Babesia, Erlichia, Lyme, E. chafensis neg


-likely viral


-HARRIETT neg for signs of endocarditis





Transaminitis


-liver function decreased from prior with dropping alb and increasing INR


-Bili elevation


-w/u by GI


-MRCP neg for obstruction














Visit type





- Emergency Visit


Emergency Visit: No





- New Patient


This patient is new to me today: No





- Critical Care


Critical Care patient: No





ATTENDING PHYSICIAN STATEMENT





I saw and evaluated the patient.


I reviewed the resident's note and discussed the case with the resident.


I agree with the resident's findings and plan as documented.








SUBJECTIVE:








OBJECTIVE:








ASSESSMENT AND PLAN:

## 2019-11-06 NOTE — PN
Progress Note (short form)





- Note


Progress Note: 





GI NOte: MRCP revgeals no CBD stones. There are GB stones but no signs of 

cholecystitis. A periportal lymph node is noted. No need for ERCP. I informed 

his son, Juan Francisco. LFTs have not declined. Await HARRIETT. INR is fortunately 

normalizing and platelets have plateaued arguing against DIC. Eliquis being 

held after discussion with Dr Samuel. I also informed Juan Francisco about this. 





Problem List





- Problems


(1) Abnormal liver enzymes


Code(s): R74.8 - ABNORMAL LEVELS OF OTHER SERUM ENZYMES   





(2) Gallstone


Code(s): K80.20 - CALCULUS OF GALLBLADDER W/O CHOLECYSTITIS W/O OBSTRUCTION   





(3) S/P aortic valve replacement


Code(s): Z95.2 - PRESENCE OF PROSTHETIC HEART VALVE   





(4) Colon adenoma


Code(s): D12.6 - BENIGN NEOPLASM OF COLON, UNSPECIFIED   





(5) Diverticulosis


Code(s): K57.90 - DVRTCLOS OF INTEST, PART UNSP, W/O PERF OR ABSCESS W/O BLEED 

  





(6) History of prostate cancer


Code(s): Z85.46 - PERSONAL HISTORY OF MALIGNANT NEOPLASM OF PROSTATE   





(7) Afib


Code(s): I48.91 - UNSPECIFIED ATRIAL FIBRILLATION   





(8) Altered mental status


Code(s): R41.82 - ALTERED MENTAL STATUS, UNSPECIFIED   





(9) Leukopenia


Code(s): D72.819 - DECREASED WHITE BLOOD CELL COUNT, UNSPECIFIED   





(10) Severe sepsis


Code(s): A41.9 - SEPSIS, UNSPECIFIED ORGANISM; R65.20 - SEVERE SEPSIS WITHOUT 

SEPTIC SHOCK   





(11) Thrombocytopenia


Code(s): D69.6 - THROMBOCYTOPENIA, UNSPECIFIED   





(12) HLD (hyperlipidemia)


Code(s): E78.5 - HYPERLIPIDEMIA, UNSPECIFIED   





(13) HTN (hypertension)


Code(s): I10 - ESSENTIAL (PRIMARY) HYPERTENSION   





(14) Family history of gastric cancer


Code(s): Z80.0 - FAMILY HISTORY OF MALIGNANT NEOPLASM OF DIGESTIVE ORGANS

## 2019-11-06 NOTE — PN
Progress Note (short form)





- Note


Progress Note: 





Mr. Campos was admitted with chills or rigors, cough and dyspnea  Known case 

of hypertension, hypertensive cardiovascular disease, severe left ventricular 

diastolic dysfunction, paroxysmal atrial fibrillation, chronic pulmonary disease

, status post  bioprosthetic aortic valve replacement.





Patient has low grade temp, no dyspnea was reported still has some residual 

cough. Underwent HARRIETT and no evidence of endocarditis(see report). 





84-year-old gentleman was in no acute distress, no pallor, cyanosis, clubbing 

or jaundice.


 


 Last Vital Signs











Temp Pulse Resp BP Pulse Ox


 


 99.7 F H  69   18   137/84   100 


 


 11/06/19 18:00  11/06/19 18:00  11/06/19 18:00  11/06/19 18:00  11/06/19 16:15











  


NECK: Supple, no jugular venous distention, hepatojugular reflux was negative, 

carotids were 2+, unable to appreciate any bruits or thyromegaly.


HEART: PMI was not localized, no heaves or thrills, heart sounds were distant, 

ejection systolic murmur grade 2/6 was heard at the second right intercostal 

space ending in early to mid systole.  No diastolic murmur or gallops were 

heard.


LUNGS: decreased breath sounds at both bases.  No extraneous sounds were heard.


ABDOMEN: Soft, protuberant, nontender.  No pedal splenomegaly or palpable 

masses were felt.


EXTREMITIES: No calf tenderness or dependent edema, pulses were equal except 

posterior tibial pulses were not palpable.





 


 Impression:





1.  Persisrent febrile state, of unknown etiology.


2. Hypertension, hypertensive cardiovascular disease.


3. Severe left ventricular diastolic dysfunction.


4. Chronic obstructive pulmonary disease.


5. Status post bioprosthetic aortic valve replacement.


6. Leukopenia and thrombocytopenia, probably related to sepsis.


7. abnorma;l LFTs.





Recommendations:


1.  Continue medications as outlined.


2.  May need to d/c antibiotics to exclude low grade fever related to 

antibiotic.


3.  GI f/u in progress.


4.  Eliquis is being d/c'd because of severe thrombcytopenia.


4.  Heme f/u in progress.





Prognosis: Guarded.

## 2019-11-06 NOTE — PN
Progress Note, Physician


History of Present Illness: 





PULMONARY





ALERT,COMFORTABLE,DYSPNEA SLOWLY IMPROVING,-CP





- Current Medication List


Current Medications: 


Active Medications





Acetaminophen (Tylenol -)  650 mg PO Q6H PRN


   PRN Reason: FEVER


   Last Admin: 11/05/19 15:54 Dose:  650 mg


Amlodipine Besylate (Norvasc -)  5 mg PO DAILY [facility]


   Last Admin: 11/06/19 09:15 Dose:  5 mg


Cyanocobalamin (Vitamin B12 -)  1,000 mcg PO DAILY [facility]


   Last Admin: 11/06/19 09:15 Dose:  1,000 mcg


Furosemide (Lasix Injection -)  40 mg IVPUSH BID@0600,1400 [facility]


   Last Admin: 11/06/19 06:29 Dose:  40 mg


Metoprolol Succinate (Toprol Xl -)  25 mg PO DAILY [facility]


   Last Admin: 11/06/19 09:15 Dose:  25 mg


Pantoprazole Sodium (Protonix -)  40 mg PO BID [facility]


   Last Admin: 11/06/19 09:15 Dose:  40 mg


Rosuvastatin Calcium (Crestor -)  5 mg PO HS [facility]


   Last Admin: 11/05/19 21:11 Dose:  5 mg


Valsartan (Diovan -)  160 mg PO DAILY [facility]


   Last Admin: 11/06/19 09:14 Dose:  160 mg











- Objective


Vital Signs: 


 Vital Signs











Temperature  98.7 F   11/06/19 09:31


 


Pulse Rate  72   11/06/19 08:59


 


Respiratory Rate  18   11/06/19 08:59


 


Blood Pressure  130/79   11/06/19 08:59


 


O2 Sat by Pulse Oximetry (%)  95   11/06/19 09:02











Constitutional: Yes: Well Nourished, Calm


Eyes: Yes: WNL


HENT: Yes: WNL


Neck: Yes: WNL


Cardiovascular: Yes: Pulse Irregular, S1, S2


Respiratory: Yes: Diminished


Gastrointestinal: Yes: Normal Bowel Sounds, Soft


Extremities: Yes: WNL


Edema: No


Labs: 


 CBC, BMP





 11/06/19 00:50 





 11/06/19 00:50 





 INR, PTT











INR  1.13  (0.83-1.09)  H  11/06/19  00:50    


 


Fibrinogen  281.0 mg/dL (238-498)   11/06/19  00:50    














Problem List





- Problems


(1) Altered mental status


Code(s): R41.82 - ALTERED MENTAL STATUS, UNSPECIFIED   





(2) Abnormal liver enzymes


Code(s): R74.8 - ABNORMAL LEVELS OF OTHER SERUM ENZYMES   





(3) Afib


Code(s): I48.91 - UNSPECIFIED ATRIAL FIBRILLATION   





(4) Leukopenia


Code(s): D72.819 - DECREASED WHITE BLOOD CELL COUNT, UNSPECIFIED   





(5) DIPTI (obstructive sleep apnea)


Code(s): G47.33 - OBSTRUCTIVE SLEEP APNEA (ADULT) (PEDIATRIC)   





(6) Thrombocytopenia


Code(s): D69.6 - THROMBOCYTOPENIA, UNSPECIFIED   





(7) Weakness


Code(s): R53.1 - WEAKNESS   





(8) HLD (hyperlipidemia)


Code(s): E78.5 - HYPERLIPIDEMIA, UNSPECIFIED   





(9) HTN (hypertension)


Code(s): I10 - ESSENTIAL (PRIMARY) HYPERTENSION   





(10) S/P aortic valve replacement


Code(s): Z95.2 - PRESENCE OF PROSTHETIC HEART VALVE   





Assessment/Plan








ASSESSMENT:


Fever of unknown origin with altered mental status improved


Toxic Metabolic Encephalopathy improved


Low clinical suspicion of Meningitis 


Sepsis


Leukopenia


AF on chronic AC


AS s/p porcine valve 2009


HTN


CHF


DIPTI


COPD


CKD


ELEVATED LFTS


MEDIASTINAL ADENOPATHY





PLAN:





-Lasix


-BD TX PRN 


-CPAP at night and when sleeping


-Oxygen 


-Rate control


-HARRIETT (? Culture negative Endocarditis) 


-Monitor LFTS,cbc


- F/U CHEST CT outpatient 








DR HAGEN

## 2019-11-06 NOTE — ECHO
Version:  1

 Study ID:  [id number]



               Institution Name



              Institution Address



          Institution Address Line #2



               Telephone & email





 Name:  GHAZALA GILMORE          Study Date:  2019, 3:15 PM



 :  1935 (MM/DD/YYYY)    Gender:  Male

 Age:  84 Years                   Ethnicity:  [facility]





 Summary Statements

 The left ventricular ejection fraction is normal.

 The right ventricular systolic function is normal.

 There is trace tricuspid regurgitation.

 The prosthetic aortic valve is well-seated.

 There is moderate mitral annular calcification.

 No evidence of endocarditis.



 Procedure:

 A 2D transesophageal echocardiogram with Doppler and color flow Doppler was performed. Informed cons
ent for

 Transesophageal Echocardiogram, and use of a contrast agent as needed, was obtained prior to the pro
cedure.

 The patient was brought to the endoscopy suite in a fasting state. An intravenous line was placed. A
 topical

 anesthetic agent was used for oropharangeal anesthesia. A bite block was inserted. IV concious sedat
ion was

 administered using propafol. A multifrequency, multiplane transesopheageal echocardiographic endosco
pe was

 inserted and manipulated in the standard fashion to achieve multiplane views. The transesophageal pr
obe was

 passed without difficulty. The usual views were obtained; basal, mid-esophageal, transgastric and ao
rtic

 views. The patient's vital signs, including blood pressure, heart rate, pulse oximetry and cardiac r
hythm

 were monitored throughout the procedure and remained stable. The patient tolerated the procedure wel
l without

 evidence of orophangeal or esophageal trauma. There were no complications. The patient was in normal
 sinus

 rhythm during the exam.

 Left Ventricle

 The left ventricular ejection fraction is normal.

 Right Ventricle

 The right ventricular systolic function is normal.

 Atria

 The interatrial septum is intact with no evidence for an atrial septal defect.

 Mitral Valve

 There is moderate mitral annular calcification. There is no mitral regurgitation noted.

 Tricuspid Valve

 There is trace tricuspid regurgitation.

 Aortic Valve

 The prosthetic aortic valve is well-seated. No hemodynamically significant valvular aortic stenosis.
 No

 aortic regurgitation is present.

 Pulmonic Valve

 The pulmonic valve is not well visualized.

 Great Vessels

 Normal aortic arch, descending and ascending aorta.

 Pericardium/Pluera

 There is no pericardial effusion.



 ____________________________________________________________________________________________________
__________











         MD Juan Francisco Flores        2019, 4:18 PM

 Ordering Physician:  KASI WHALEN

 Referring Physician:  MARLEE

 Performed By:  Vivian Aj

## 2019-11-06 NOTE — PROC
Transesophageal Echocardiogram





- Pre-Procedure


Indications: R/O Infective Endocarditis


Risks and Benefits Explained: Yes


Consent on Chart: Yes





- Procedure


Procedure Done: in Endoscopy Suite


Medication given: propofol


Findings: No evidence of endocarditis.


Remarks: 





Pt tolerated procedure well without complication.

## 2019-11-07 LAB
ALBUMIN SERPL-MCNC: 3.2 G/DL (ref 3.4–5)
ALP SERPL-CCNC: 567 U/L (ref 45–117)
ALT SERPL-CCNC: 203 U/L (ref 13–61)
ANION GAP SERPL CALC-SCNC: 7 MMOL/L (ref 8–16)
AST SERPL-CCNC: 209 U/L (ref 15–37)
BASOPHILS # BLD: 0.7 % (ref 0–2)
BILIRUB SERPL-MCNC: 2.5 MG/DL (ref 0.2–1)
BUN SERPL-MCNC: 44.8 MG/DL (ref 7–18)
CALCIUM SERPL-MCNC: 9.5 MG/DL (ref 8.5–10.1)
CHLORIDE SERPL-SCNC: 98 MMOL/L (ref 98–107)
CO2 SERPL-SCNC: 30 MMOL/L (ref 21–32)
CREAT SERPL-MCNC: 1.7 MG/DL (ref 0.55–1.3)
DEPRECATED RDW RBC AUTO: 13.9 % (ref 11.9–15.9)
EOSINOPHIL # BLD: 2.8 % (ref 0–4.5)
GLUCOSE SERPL-MCNC: 156 MG/DL (ref 74–106)
HCT VFR BLD CALC: 43 % (ref 35.4–49)
HGB BLD-MCNC: 14.5 GM/DL (ref 11.7–16.9)
LYMPHOCYTES # BLD: 11.5 % (ref 8–40)
MAGNESIUM SERPL-MCNC: 2.3 MG/DL (ref 1.8–2.4)
MCH RBC QN AUTO: 29.5 PG (ref 25.7–33.7)
MCHC RBC AUTO-ENTMCNC: 33.8 G/DL (ref 32–35.9)
MCV RBC: 87.4 FL (ref 80–96)
MONOCYTES # BLD AUTO: 8.9 % (ref 3.8–10.2)
NEUTROPHILS # BLD: 76.1 % (ref 42.8–82.8)
PLATELET # BLD AUTO: 86 K/MM3 (ref 134–434)
PMV BLD: 10.1 FL (ref 7.5–11.1)
POTASSIUM SERPLBLD-SCNC: 3.9 MMOL/L (ref 3.5–5.1)
PROT SERPL-MCNC: 6.8 G/DL (ref 6.4–8.2)
RBC # BLD AUTO: 4.93 M/MM3 (ref 4–5.6)
SODIUM SERPL-SCNC: 135 MMOL/L (ref 136–145)
WBC # BLD AUTO: 4.1 K/MM3 (ref 4–10)

## 2019-11-07 RX ADMIN — Medication SCH: at 21:54

## 2019-11-07 RX ADMIN — SODIUM CHLORIDE SCH MLS/HR: 9 INJECTION, SOLUTION INTRAVENOUS at 19:45

## 2019-11-07 RX ADMIN — PANTOPRAZOLE SODIUM SCH MG: 40 TABLET, DELAYED RELEASE ORAL at 21:53

## 2019-11-07 RX ADMIN — ONDANSETRON PRN MG: 2 INJECTION INTRAMUSCULAR; INTRAVENOUS at 18:01

## 2019-11-07 RX ADMIN — ROSUVASTATIN CALCIUM SCH MG: 5 TABLET, FILM COATED ORAL at 21:53

## 2019-11-07 RX ADMIN — FUROSEMIDE SCH MG: 10 INJECTION, SOLUTION INTRAVENOUS at 06:23

## 2019-11-07 RX ADMIN — FUROSEMIDE SCH MG: 10 INJECTION, SOLUTION INTRAVENOUS at 16:02

## 2019-11-07 RX ADMIN — VALSARTAN SCH MG: 160 TABLET, FILM COATED ORAL at 09:47

## 2019-11-07 RX ADMIN — Medication SCH MG: at 21:54

## 2019-11-07 RX ADMIN — ANALGESIC BALM SCH APPLIC: 1.74; 4.06 OINTMENT TOPICAL at 21:53

## 2019-11-07 RX ADMIN — PANTOPRAZOLE SODIUM SCH MG: 40 TABLET, DELAYED RELEASE ORAL at 09:47

## 2019-11-07 RX ADMIN — ACETAMINOPHEN PRN MG: 325 TABLET ORAL at 18:01

## 2019-11-07 RX ADMIN — AMLODIPINE BESYLATE SCH MG: 5 TABLET ORAL at 09:47

## 2019-11-07 RX ADMIN — ACETAMINOPHEN PRN MG: 325 TABLET ORAL at 07:02

## 2019-11-07 RX ADMIN — ANALGESIC BALM SCH APPLIC: 1.74; 4.06 OINTMENT TOPICAL at 14:16

## 2019-11-07 RX ADMIN — LIDOCAINE SCH PATCH: 50 PATCH TOPICAL at 14:13

## 2019-11-07 RX ADMIN — CYANOCOBALAMIN TAB 1000 MCG SCH MCG: 1000 TAB at 09:47

## 2019-11-07 NOTE — PN
Progress Note (short form)





- Note


Progress Note: 





PULMONARY





States breathing is improving. No cough or wheezing. Using CPAP at night.





 Vital Signs











 Period  Temp  Pulse  Resp  BP Sys/Rosales  Pulse Ox


 


 Last 24 Hr  96.4 F-100.3 F  64-76  16-27  107-159/  








Gen:  NAD at rest


Heart: RRR


Lung: decreased breath sounds at the bases


Abd: soft, nontender


Ext: no edema





 CBC, BMP





 11/07/19 06:15 





 11/07/19 06:15 





Active Medications





Acetaminophen (Tylenol -)  650 mg PO Q6H PRN


   PRN Reason: FEVER


   Last Admin: 11/07/19 07:02 Dose:  650 mg


Amlodipine Besylate (Norvasc -)  5 mg PO DAILY [facility]


   Last Admin: 11/07/19 09:47 Dose:  5 mg


Cyanocobalamin (Vitamin B12 -)  1,000 mcg PO DAILY [facility]


   Last Admin: 11/07/19 09:47 Dose:  1,000 mcg


Furosemide (Lasix Injection -)  40 mg IVPUSH BID@0600,1400 [facility]


   Last Admin: 11/07/19 06:23 Dose:  40 mg


Methyl Salicylate (Cm-Jarrell -)  1 applic TP BID [facility]


Metoprolol Succinate (Toprol Xl -)  25 mg PO DAILY [facility]


   Last Admin: 11/07/19 09:47 Dose:  25 mg


Miscellaneous (Lidoderm Patch Removal)  1 each MC ONCE ONE


   Stop: 11/07/19 15:01


Pantoprazole Sodium (Protonix -)  40 mg PO BID [facility]


   Last Admin: 11/07/19 09:47 Dose:  40 mg


Rosuvastatin Calcium (Crestor -)  5 mg PO HS [facility]


   Last Admin: 11/06/19 21:25 Dose:  5 mg


Valsartan (Diovan -)  160 mg PO DAILY [facility]


   Last Admin: 11/07/19 09:47 Dose:  160 mg





A/P


Fevers resolving


Sepsis


Leukopenia/Thromboctyopenia


Atrial Fibrillation


Aortic Stenosis


LV Systolic Dysfunction


COPD


CKD


DIPTI


HTN


Mediastinal Lymphadenopathy





-  completed antibiotics


-  continue lasix


-  monitor urine output, creatinine


-  daily weights


-  rate control


-  resume anticoagulation when thromboctyopenia improved


-  O2 to keep SpO2>90%


-  inhaled bronchodilators


-  CPAP at night


-  outpt f/u of CT chest

## 2019-11-07 NOTE — PN
Progress Note, Physician


History of Present Illness: 





stable


using cpap


off of abx


bandar results noted


patient feeling better





- Current Medication List


Current Medications: 


Active Medications





Acetaminophen (Tylenol -)  650 mg PO Q6H PRN


   PRN Reason: FEVER


   Last Admin: 11/07/19 07:02 Dose:  650 mg


Amlodipine Besylate (Norvasc -)  5 mg PO DAILY [facility]


   Last Admin: 11/07/19 09:47 Dose:  5 mg


Cyanocobalamin (Vitamin B12 -)  1,000 mcg PO DAILY [facility]


   Last Admin: 11/07/19 09:47 Dose:  1,000 mcg


Furosemide (Lasix Injection -)  40 mg IVPUSH BID@0600,1400 [facility]


   Last Admin: 11/07/19 06:23 Dose:  40 mg


Methyl Salicylate (Cm-Jarrell -)  1 applic TP BID [facility]


Metoprolol Succinate (Toprol Xl -)  25 mg PO DAILY [facility]


   Last Admin: 11/07/19 09:47 Dose:  25 mg


Miscellaneous (Lidoderm Patch Removal)  1 each MC ONCE ONE


   Stop: 11/07/19 15:01


Pantoprazole Sodium (Protonix -)  40 mg PO BID [facility]


   Last Admin: 11/07/19 09:47 Dose:  40 mg


Rosuvastatin Calcium (Crestor -)  5 mg PO HS [facility]


   Last Admin: 11/06/19 21:25 Dose:  5 mg


Valsartan (Diovan -)  160 mg PO DAILY [facility]


   Last Admin: 11/07/19 09:47 Dose:  160 mg











- Objective


Vital Signs: 


 Vital Signs











Temperature  96.4 F L  11/07/19 06:00


 


Pulse Rate  73   11/07/19 06:00


 


Respiratory Rate  16   11/07/19 06:00


 


Blood Pressure  144/66   11/07/19 06:00


 


O2 Sat by Pulse Oximetry (%)  94 L  11/06/19 21:00











Constitutional: Yes: No Distress, Calm


Cardiovascular: Yes: S1, S2


Respiratory: Yes: Regular, CTA Bilaterally


Gastrointestinal: Yes: Normal Bowel Sounds, Soft


Musculoskeletal: Yes: WNL


Extremities: Yes: WNL


Neurological: Yes: Alert, Oriented


Psychiatric: Yes: Alert, Oriented


Labs: 


 CBC, BMP





 11/07/19 06:15 





 11/07/19 06:15 





 INR, PTT











INR  1.13  (0.83-1.09)  H  11/06/19  00:50    


 


Fibrinogen  281.0 mg/dL (238-498)   11/06/19  00:50    














Assessment/Plan








Problem List





- Problems


(1) Severe sepsis


Code(s): A41.9 - SEPSIS, UNSPECIFIED ORGANISM; R65.20 - SEVERE SEPSIS WITHOUT 

SEPTIC SHOCK   





(2) DIPTI (obstructive sleep apnea)


Code(s): G47.33 - OBSTRUCTIVE SLEEP APNEA (ADULT) (PEDIATRIC)   





(3) HLD (hyperlipidemia)


Code(s): E78.5 - HYPERLIPIDEMIA, UNSPECIFIED   





(4) HTN (hypertension)


Code(s): I10 - ESSENTIAL (PRIMARY) HYPERTENSION   





(5) S/P aortic valve replacement 


Code(s): Z95.2 - PRESENCE OF PROSTHETIC HEART VALVE   





(6) Afib


Code(s): I48.91 - UNSPECIFIED ATRIAL FIBRILLATION   





plan


continue current mgmt


cpap


incentive natividad


rest as per the team

## 2019-11-07 NOTE — PN
Progress Note, Physician


Chief Complaint: 





Sitting in chair at bedside. States he didnt sleep well last night d/t back pain


History of Present Illness: 





Patient is an 84 year old male with a significant past medical history of 

hypertension, hyperlipidemia, prosthetic aortic valve replacement (bovine). 

afib (on eliquis).   He presented to the [facility] ED with ataxia, chills, rigors and 

weakness.  patient had rigors at home and brought into the ED by his family for 

further evaluation. 








- Current Medication List


Current Medications: 


Active Medications





Acetaminophen (Tylenol -)  650 mg PO Q6H PRN


   PRN Reason: FEVER


   Last Admin: 11/07/19 07:02 Dose:  650 mg


Amlodipine Besylate (Norvasc -)  5 mg PO DAILY [facility]


   Last Admin: 11/06/19 09:15 Dose:  5 mg


Cyanocobalamin (Vitamin B12 -)  1,000 mcg PO DAILY [facility]


   Last Admin: 11/06/19 09:15 Dose:  1,000 mcg


Furosemide (Lasix Injection -)  40 mg IVPUSH BID@0600,1400 [facility]


   Last Admin: 11/07/19 06:23 Dose:  40 mg


Metoprolol Succinate (Toprol Xl -)  25 mg PO DAILY [facility]


   Last Admin: 11/06/19 09:15 Dose:  25 mg


Miscellaneous (Lidoderm Patch Removal)  1 each MC ONCE ONE


   Stop: 11/07/19 15:01


Pantoprazole Sodium (Protonix -)  40 mg PO BID [facility]


   Last Admin: 11/06/19 21:25 Dose:  40 mg


Rosuvastatin Calcium (Crestor -)  5 mg PO HS [facility]


   Last Admin: 11/06/19 21:25 Dose:  5 mg


Valsartan (Diovan -)  160 mg PO DAILY [facility]


   Last Admin: 11/06/19 09:14 Dose:  160 mg











- Objective


Vital Signs: 


 Vital Signs











Temperature  96.4 F L  11/07/19 06:00


 


Pulse Rate  73   11/07/19 06:00


 


Respiratory Rate  16   11/07/19 06:00


 


Blood Pressure  144/66   11/07/19 06:00


 


O2 Sat by Pulse Oximetry (%)  94 L  11/06/19 21:00











Additional Findings/Remarks: 





Constitutional: Yes: Well Nourished, No Distress, Calm


Eyes: Yes: WNL, Conjunctiva Clear


HENT: Yes: WNL, Atraumatic, Normocephalic


Neck: Yes: WNL, Supple, Trachea Midline


Cardiovascular: Yes: WNL, Regular Rate and Rhythm


Respiratory: Yes: Diminished (at bases, scattered rales)


Gastrointestinal: Yes: WNL, Normal Bowel Sounds, Soft, Abdomen, Obese. No 

palpable masses noted


...Rectal Exam: Yes: Deferred


Genitourinary: Yes: WNL


Breast(s): Yes: WNL


Musculoskeletal: Yes: WNL


Extremities: Yes: WNL


Edema: No


Peripheral Pulses WNL: Yes


Peripheral Pulses: Left Radial: 2+, Right Radial: 2+, Left Doralis Pedis: 2+, 

Right Dorsalis Pedis: 2+, Left Femoral: 2+, Right Femoral: 2+


Neurological: Yes: Confused asking same questions over. 


...Motor Strength: WNL (generalized weakness)


Psychiatric: Yes: WNL





Labs: 


 CBC, BMP





 11/07/19 06:15 





 11/07/19 06:15 





 INR, PTT











INR  1.13  (0.83-1.09)  H  11/06/19  00:50    


 


Fibrinogen  281.0 mg/dL (238-498)   11/06/19  00:50    














- ....Imaging


Ultrasound: Pending (abdominal)


Other: Report Reviewed (TTE:No evidence of endocarditis.)





Problem List





- Problems


(1) Abnormal liver enzymes


Assessment/Plan: 


LFTs beginning to trend down, AST/ALT  209/203


NH3 <10


US with hepatomegaly-fatty liver vs Hepatocellular 


palabale mass noted to LUQ yesterday not appreciated today


Repeat US pending


avoid hepatotoxic agents


MRCP revealing no CBD stones, GB stone but no cholecystitis. No need for ERCP


Code(s): R74.8 - ABNORMAL LEVELS OF OTHER SERUM ENZYMES   





(2) Afib


Assessment/Plan: 


Rate controlled


c/w toprol


Eliquis stopped gievn throbocytopenia. Will restart on discharge as platelets 

trend back to nml


tele monitoring


Code(s): I48.91 - UNSPECIFIED ATRIAL FIBRILLATION   





(3) Altered mental status


Assessment/Plan: 


confused this morning


able to redirect


attempt to maintain sleep wake cycle


fall precautions


Code(s): R41.82 - ALTERED MENTAL STATUS, UNSPECIFIED   





(4) Leukopenia


Assessment/Plan: 


WBC 4.0 today


neutropenia of unclear etiology, but appears to be resolving


viral panel negative, rpr negative, blood smear negative


leukopenia may have been due to acute infection that was adequately treated . 

All Cx are NGTD but still having low grade daily fevers. T max 100.0@ 2200


daily cbc 


heme consultation appreciated





Code(s): D72.819 - DECREASED WHITE BLOOD CELL COUNT, UNSPECIFIED   





(5) DIPTI (obstructive sleep apnea)


Assessment/Plan: 


c/w CPAP prn and at night


F/U CHEST CT outpatient 


Code(s): G47.33 - OBSTRUCTIVE SLEEP APNEA (ADULT) (PEDIATRIC)   





(6) Prophylactic measure


Assessment/Plan: 


FEN


cardiac diet


no additional IVF needed


monitor electrolytes








DVT


eliquis stopped





Dispo


maintain as in patient


full code


discharge planning


Code(s): Z29.9 - ENCOUNTER FOR PROPHYLACTIC MEASURES, UNSPECIFIED   





(7) Severe sepsis


Assessment/Plan: 


presented with severe sepsis with neutropenia, fevers, malaise, and fatigue/

weakness.  


now with inproving leukopenia and thrombocytopenia 


Fever of unknown origin, off abx x 24h. COntinue to monitor off


tick borne disease panel /viral negative


chest ct shows mediastinal adenopaty with moderate increase since prior exam


CT with gall stones, no acute lb seen, spine CT does not explain source of 

fever


HARRIETT with no vegetations


Code(s): A41.9 - SEPSIS, UNSPECIFIED ORGANISM; R65.20 - SEVERE SEPSIS WITHOUT 

SEPTIC SHOCK   





(8) Thrombocytopenia


Assessment/Plan: 


plt ct 86


no signs of bleeding, will monitor


ESR/CRP 2.1 /SANTOS/RF pending


eliquis stopped


appreciate hematology consultation





Code(s): D69.6 - THROMBOCYTOPENIA, UNSPECIFIED   





(9) Weakness


Assessment/Plan: 


improving with PT


fall precautions


family/pt would like to continue outpatient PT on discharge


Code(s): R53.1 - WEAKNESS   





Visit type





- Emergency Visit


Emergency Visit: Yes


ED Registration Date: 11/01/19


Care time: The patient presented to the Emergency Department on the above date 

and was hospitalized for further evaluation of their emergent condition.





- New Patient


This patient is new to me today: No





- Critical Care


Critical Care patient: No





- Discharge Referral


Referred to [facility] No

## 2019-11-07 NOTE — PN
Progress Note (short form)





- Note


Progress Note: 





Mr. Campos was admitted with chills or rigors, cough and dyspnea  Known case 

of hypertension, hypertensive cardiovascular disease, severe left ventricular 

diastolic dysfunction, paroxysmal atrial fibrillation, chronic pulmonary disease

, status post  bioprosthetic aortic valve replacement.





Found to have a small  nontender RUQ abdominal mass by Dr. Mccloud and scheduled 

for an abdominal ultrasound, no dyspnea or SOB still has some residual cough. 

Underwent HARRIETT and no evidence of endocarditis and valve is well seated.(see 

report). 





84-year-old gentleman was in no acute distress, no pallor, cyanosis, clubbing 

or jaundice. Afebrile.


 


  Last Vital Signs











Temp Pulse Resp BP Pulse Ox


 


 96.4 F L  73   16   144/66   94 L


 


 11/07/19 06:00  11/07/19 06:00  11/07/19 06:00  11/07/19 06:00  11/06/19 21:00














  


NECK: Supple, no jugular venous distention, hepatojugular reflux was negative, 

carotids were 2+, unable to appreciate any bruits or thyromegaly.


HEART: PMI was not localized, no heaves or thrills, heart sounds were distant, 

ejection systolic murmur grade 2/6 was heard at the second right intercostal 

space ending in early to mid systole.  No diastolic murmur or gallops were 

heard.


LUNGS: decreased breath sounds at both bases.  No extraneous sounds were heard.


ABDOMEN: Soft, protuberant, nontender.  No pedal splenomegaly or palpable 

masses were felt.


EXTREMITIES: No calf tenderness or dependent edema, pulses were equal except 

posterior tibial pulses were not palpable.





 


 Impression:





1.  Persisrent febrile state, of unknown etiology.


2. Hypertension, hypertensive cardiovascular disease.


3. Severe left ventricular diastolic dysfunction.


4. Chronic obstructive pulmonary disease.


5. Status post bioprosthetic aortic valve replacement.


6. Leukopenia and thrombocytopenia, probably related to sepsis.


7. abnorma;l LFTs.


8. Permenent atrial fib. with controlled ventricular response.


9. Small RUQ nontender abdominal mass.





Recommendations:


1.  Continue medications as outlined.


2.  Antibiotics D/C'd.


3.  GI f/u in progress.


4.  Eliquis is being d/c'd because of severe thrombcytopenia should be restrted 

ASAP.


5.  Heme f/u in progress.





Prognosis: Guarded.

## 2019-11-08 LAB
ALBUMIN SERPL-MCNC: 3.1 G/DL (ref 3.4–5)
ALP SERPL-CCNC: 444 U/L (ref 45–117)
ALT SERPL-CCNC: 152 U/L (ref 13–61)
ANION GAP SERPL CALC-SCNC: 7 MMOL/L (ref 8–16)
APTT BLD: 32 SECONDS (ref 25.2–36.5)
AST SERPL-CCNC: 113 U/L (ref 15–37)
BASOPHILS # BLD: 0.3 % (ref 0–2)
BILIRUB DIRECT SERPL-MCNC: 281 U/L (ref 87–246)
BILIRUB SERPL-MCNC: 2.2 MG/DL (ref 0.2–1)
BUN SERPL-MCNC: 42.7 MG/DL (ref 7–18)
CALCIUM SERPL-MCNC: 9.4 MG/DL (ref 8.5–10.1)
CHLORIDE SERPL-SCNC: 99 MMOL/L (ref 98–107)
CO2 SERPL-SCNC: 29 MMOL/L (ref 21–32)
CREAT SERPL-MCNC: 1.4 MG/DL (ref 0.55–1.3)
DEPRECATED RDW RBC AUTO: 14.1 % (ref 11.9–15.9)
EOSINOPHIL # BLD: 0.9 % (ref 0–4.5)
GLUCOSE SERPL-MCNC: 144 MG/DL (ref 74–106)
HCT VFR BLD CALC: 40.5 % (ref 35.4–49)
HGB BLD-MCNC: 13.6 GM/DL (ref 11.7–16.9)
INR BLD: 1.05 (ref 0.83–1.09)
LYMPHOCYTES # BLD: 7.3 % (ref 8–40)
MAGNESIUM SERPL-MCNC: 2.2 MG/DL (ref 1.8–2.4)
MCH RBC QN AUTO: 29.6 PG (ref 25.7–33.7)
MCHC RBC AUTO-ENTMCNC: 33.7 G/DL (ref 32–35.9)
MCV RBC: 87.8 FL (ref 80–96)
MONOCYTES # BLD AUTO: 9 % (ref 3.8–10.2)
NEUTROPHILS # BLD: 82.5 % (ref 42.8–82.8)
PLATELET # BLD AUTO: 99 K/MM3 (ref 134–434)
PMV BLD: 9.2 FL (ref 7.5–11.1)
POTASSIUM SERPLBLD-SCNC: 3.3 MMOL/L (ref 3.5–5.1)
PROT SERPL-MCNC: 6.4 G/DL (ref 6.4–8.2)
PT PNL PPP: 12.4 SEC (ref 9.7–13)
RBC # BLD AUTO: 4.61 M/MM3 (ref 4–5.6)
SODIUM SERPL-SCNC: 136 MMOL/L (ref 136–145)
URATE SERPL-SCNC: 7.4 MG/DL (ref 2.6–7.2)
WBC # BLD AUTO: 4.7 K/MM3 (ref 4–10)

## 2019-11-08 RX ADMIN — Medication SCH MG: at 21:49

## 2019-11-08 RX ADMIN — APIXABAN SCH MG: 2.5 TABLET, FILM COATED ORAL at 21:47

## 2019-11-08 RX ADMIN — FUROSEMIDE SCH MG: 10 INJECTION, SOLUTION INTRAVENOUS at 06:37

## 2019-11-08 RX ADMIN — PANTOPRAZOLE SODIUM SCH MG: 40 TABLET, DELAYED RELEASE ORAL at 09:40

## 2019-11-08 RX ADMIN — ANALGESIC BALM SCH APPLIC: 1.74; 4.06 OINTMENT TOPICAL at 21:48

## 2019-11-08 RX ADMIN — AMLODIPINE BESYLATE SCH MG: 5 TABLET ORAL at 09:40

## 2019-11-08 RX ADMIN — PANTOPRAZOLE SODIUM SCH MG: 40 TABLET, DELAYED RELEASE ORAL at 21:47

## 2019-11-08 RX ADMIN — Medication SCH EACH: at 21:49

## 2019-11-08 RX ADMIN — ANALGESIC BALM SCH APPLIC: 1.74; 4.06 OINTMENT TOPICAL at 09:44

## 2019-11-08 RX ADMIN — SODIUM CHLORIDE SCH MLS/HR: 9 INJECTION, SOLUTION INTRAVENOUS at 18:30

## 2019-11-08 RX ADMIN — ROSUVASTATIN CALCIUM SCH MG: 5 TABLET, FILM COATED ORAL at 21:47

## 2019-11-08 RX ADMIN — LACTULOSE SCH GM: 20 SOLUTION ORAL at 21:48

## 2019-11-08 RX ADMIN — LIDOCAINE SCH PATCH: 50 PATCH TOPICAL at 09:50

## 2019-11-08 RX ADMIN — ACETAMINOPHEN PRN MG: 325 TABLET ORAL at 01:44

## 2019-11-08 RX ADMIN — VALSARTAN SCH MG: 160 TABLET, FILM COATED ORAL at 09:40

## 2019-11-08 RX ADMIN — CYANOCOBALAMIN TAB 1000 MCG SCH MCG: 1000 TAB at 09:40

## 2019-11-08 RX ADMIN — FUROSEMIDE SCH MG: 10 INJECTION, SOLUTION INTRAVENOUS at 14:55

## 2019-11-08 NOTE — PN
Progress Note, Physician


Chief Complaint: 





Sitting in chair at bedside. C/o back pain, slept better last night


History of Present Illness: 





Patient is an 84 year old male with a significant past medical history of 

hypertension, hyperlipidemia, prosthetic aortic valve replacement (bovine). 

afib (on eliquis).   He presented to the [facility] ED with ataxia, chills, rigors and 

weakness.  patient had rigors at home and brought into the ED by his family for 

further evaluation. 








- Current Medication List


Current Medications: 


Active Medications





Acetaminophen (Tylenol -)  650 mg PO Q6H PRN


   PRN Reason: FEVER


   Last Admin: 11/08/19 01:44 Dose:  650 mg


Amlodipine Besylate (Norvasc -)  5 mg PO DAILY [facility]


   Last Admin: 11/07/19 09:47 Dose:  5 mg


Cyanocobalamin (Vitamin B12 -)  1,000 mcg PO DAILY [facility]


   Last Admin: 11/07/19 09:47 Dose:  1,000 mcg


Furosemide (Lasix Injection -)  40 mg IVPUSH BID@0600,1400 [facility]


   Last Admin: 11/08/19 06:37 Dose:  40 mg


Sodium Chloride (Normal Saline -)  1,000 mls @ 75 mls/hr IV ASDIR [facility]


   Last Admin: 11/07/19 19:45 Dose:  75 mls/hr


Lidocaine (Lidoderm Patch -)  1 patch TP DAILY [facility]


   Last Admin: 11/07/19 14:13 Dose:  1 patch


Melatonin (Melatonin)  3 mg PO HS [facility]


   Last Admin: 11/07/19 21:54 Dose:  3 mg


Methyl Salicylate (Cm-Jarrell -)  1 applic TP BID [facility]


   Last Admin: 11/07/19 21:53 Dose:  1 applic


Metoprolol Succinate (Toprol Xl -)  25 mg PO DAILY [facility]


   Last Admin: 11/07/19 09:47 Dose:  25 mg


Miscellaneous (Lidoderm Patch Removal)  1 each MC DAILY@2200 [facility]


   Last Admin: 11/07/19 21:54 Dose:  Not Given


Ondansetron HCl (Zofran Injection)  4 mg IVPUSH Q6H PRN


   PRN Reason: NAUSEA AND/OR VOMITING


   Last Admin: 11/07/19 18:01 Dose:  4 mg


Pantoprazole Sodium (Protonix -)  40 mg PO BID [facility]


   Last Admin: 11/07/19 21:53 Dose:  40 mg


Rosuvastatin Calcium (Crestor -)  5 mg PO HS [facility]


   Last Admin: 11/07/19 21:53 Dose:  5 mg


Valsartan (Diovan -)  160 mg PO DAILY [facility]


   Last Admin: 11/07/19 09:47 Dose:  160 mg











- Objective


Vital Signs: 


 Vital Signs











Temperature  98.6 F   11/08/19 05:58


 


Pulse Rate  82   11/08/19 05:58


 


Respiratory Rate  18   11/08/19 05:58


 


Blood Pressure  160/74   11/08/19 05:58


 


O2 Sat by Pulse Oximetry (%)  93 L  11/07/19 21:00











Additional Findings/Remarks: 





Constitutional: Yes: Well Nourished, No Distress, Calm


Eyes: Yes: WNL, Conjunctiva Clear


HENT: Yes: WNL, Atraumatic, Normocephalic


Neck: Yes: WNL, Supple, Trachea Midline


Cardiovascular: Yes: WNL, Regular Rate and Rhythm


Respiratory: Yes: Diminished at bases


Gastrointestinal: Yes: WNL, Normal Bowel Sounds, Soft, Abdomen, Obese. No 

palpable masses noted


...Rectal Exam: Yes: Deferred


Genitourinary: Yes: WNL


Breast(s): Yes: WNL


Musculoskeletal: Yes: WNL


Extremities: Yes: WNL


Edema: No


Peripheral Pulses WNL: Yes


Peripheral Pulses: Left Radial: 2+, Right Radial: 2+, Left Doralis Pedis: 2+, 

Right Dorsalis Pedis: 2+, Left Femoral: 2+, Right Femoral: 2+


Neurological: Yes: Confused asking same questions over. 


...Motor Strength: WNL (generalized weakness)


Psychiatric: Yes: WNL


Labs: 


 CBC, BMP





 11/08/19 06:00 





 11/08/19 06:00 





 INR, PTT











INR  1.13  (0.83-1.09)  H  11/06/19  00:50    


 


Fibrinogen  281.0 mg/dL (238-498)   11/06/19  00:50    














Problem List





- Problems


(1) Abnormal liver enzymes


Assessment/Plan: 


LFTs beginning to trend down, AST/ALT  113/152


NH3 <10


US with hepatomegaly-fatty liver vs Hepatocellular 


palabale mass noted to LUQ not appreciated today


Repeat US without mass 


avoid hepatotoxic agents


MRCP revealing no CBD stones, GB stone but no cholecystitis. No need for ERCP


Code(s): R74.8 - ABNORMAL LEVELS OF OTHER SERUM ENZYMES   





(2) Afib


Assessment/Plan: 


Rate controlled


c/w toprol


Eliquis stopped given throbocytopenia. Platelets trending up-if continue to 

uptrend will restart


tele monitoring


Code(s): I48.91 - UNSPECIFIED ATRIAL FIBRILLATION   





(3) Altered mental status


Assessment/Plan: 


no confusion this morning


melatonin given last night with good effect


fall precautions


Code(s): R41.82 - ALTERED MENTAL STATUS, UNSPECIFIED   





(4) Leukopenia


Assessment/Plan: 


WBC 4.7 today


neutropenia of unclear etiology, but appears to be resolving


viral panel negative, rpr negative, blood smear negative


leukopenia may have been due to acute infection that was adequately treated . 

All Cx are NGTD but still having low grade daily fevers. T max 100.0@ 2200


daily cbc 


heme consultation appreciated





Code(s): D72.819 - DECREASED WHITE BLOOD CELL COUNT, UNSPECIFIED   





(5) DIPTI (obstructive sleep apnea)


Assessment/Plan: 


c/w CPAP prn and at night


F/U CHEST CT outpatient 


Code(s): G47.33 - OBSTRUCTIVE SLEEP APNEA (ADULT) (PEDIATRIC)   





(6) Prophylactic measure


Assessment/Plan: 


FEN


cardiac diet


no additional IVF needed


monitor electrolytes








DVT


eliquis stopped





Dispo


maintain as in patient


full code


discharge planning


Code(s): Z29.9 - ENCOUNTER FOR PROPHYLACTIC MEASURES, UNSPECIFIED   





(7) Severe sepsis


Assessment/Plan: 


presented with severe sepsis with neutropenia, fevers, malaise, and fatigue/

weakness.  


now with inproving leukopenia and thrombocytopenia 


Fever of unknown origin, off abx x 24h. COntinue to monitor off


tick borne disease panel /viral negative


chest ct shows mediastinal adenopaty with moderate increase since prior exam


CT with gall stones, no acute lb seen, spine CT does not explain source of 

fever


HARRIETT with no vegetations


Code(s): A41.9 - SEPSIS, UNSPECIFIED ORGANISM; R65.20 - SEVERE SEPSIS WITHOUT 

SEPTIC SHOCK   





(8) Thrombocytopenia


Assessment/Plan: 


plt ct 99


no signs of bleeding, will monitor


ESR/CRP 2.1 /SANTOS/RF pending


eliquis stopped


appreciate hematology consultation





Code(s): D69.6 - THROMBOCYTOPENIA, UNSPECIFIED   





(9) Weakness


Assessment/Plan: 


improving with PT


fall precautions


family/pt would like to continue outpatient PT on discharge


Code(s): R53.1 - WEAKNESS   





Visit type





- Emergency Visit


Emergency Visit: Yes


ED Registration Date: 11/01/19


Care time: The patient presented to the Emergency Department on the above date 

and was hospitalized for further evaluation of their emergent condition.





- New Patient


This patient is new to me today: No





- Critical Care


Critical Care patient: No





- Discharge Referral


Referred to [facility] No

## 2019-11-08 NOTE — PN
Progress Note, Physician


History of Present Illness: 





back pain


no other issues


fevers resolving





- Current Medication List


Current Medications: 


Active Medications





Acetaminophen (Tylenol -)  650 mg PO Q6H PRN


   PRN Reason: FEVER


   Last Admin: 11/08/19 01:44 Dose:  650 mg


Amlodipine Besylate (Norvasc -)  5 mg PO DAILY [facility]


   Last Admin: 11/08/19 09:40 Dose:  5 mg


Cyanocobalamin (Vitamin B12 -)  1,000 mcg PO DAILY [facility]


   Last Admin: 11/08/19 09:40 Dose:  1,000 mcg


Furosemide (Lasix Injection -)  40 mg IVPUSH BID@0600,1400 [facility]


   Last Admin: 11/08/19 06:37 Dose:  40 mg


Sodium Chloride (Normal Saline -)  1,000 mls @ 75 mls/hr IV ASDIR [facility]


   Last Admin: 11/07/19 19:45 Dose:  75 mls/hr


Lidocaine (Lidoderm Patch -)  1 patch TP DAILY [facility]


   Last Admin: 11/08/19 09:50 Dose:  1 patch


Melatonin (Melatonin)  3 mg PO HS [facility]


   Last Admin: 11/07/19 21:54 Dose:  3 mg


Methyl Salicylate (Cm-Jarrell -)  1 applic TP BID [facility]


   Last Admin: 11/08/19 09:44 Dose:  1 applic


Metoprolol Succinate (Toprol Xl -)  25 mg PO DAILY [facility]


   Last Admin: 11/08/19 09:40 Dose:  25 mg


Miscellaneous (Lidoderm Patch Removal)  1 each MC DAILY@2200 [facility]


   Last Admin: 11/07/19 21:54 Dose:  Not Given


Ondansetron HCl (Zofran Injection)  4 mg IVPUSH Q6H PRN


   PRN Reason: NAUSEA AND/OR VOMITING


   Last Admin: 11/07/19 18:01 Dose:  4 mg


Pantoprazole Sodium (Protonix -)  40 mg PO BID [facility]


   Last Admin: 11/08/19 09:40 Dose:  40 mg


Rosuvastatin Calcium (Crestor -)  5 mg PO HS [facility]


   Last Admin: 11/07/19 21:53 Dose:  5 mg


Tramadol HCl (Ultram -)  50 mg PO Q8H PRN


   PRN Reason: PAIN LEVEL 6-10


Valsartan (Diovan -)  160 mg PO DAILY [facility]


   Last Admin: 11/08/19 09:40 Dose:  160 mg











- Objective


Vital Signs: 


 Vital Signs











Temperature  98.4 F   11/08/19 09:02


 


Pulse Rate  67   11/08/19 09:02


 


Respiratory Rate  18   11/08/19 09:02


 


Blood Pressure  166/88   11/08/19 09:02


 


O2 Sat by Pulse Oximetry (%)  93 L  11/08/19 08:59











Constitutional: Yes: Calm


Cardiovascular: Yes: S1, S2


Respiratory: Yes: Regular, CTA Bilaterally


Gastrointestinal: Yes: Normal Bowel Sounds, Soft


Musculoskeletal: Yes: WNL


Extremities: Yes: WNL


Neurological: Yes: Alert, Oriented


Psychiatric: Yes: Alert, Oriented


Labs: 


 CBC, BMP





 11/08/19 06:00 





 11/08/19 06:00 





 INR, PTT











INR  1.05  (0.83-1.09)   11/08/19  08:15    


 


Fibrinogen  229.0 mg/dL (238-498)  L  11/08/19  08:15    














Assessment/Plan








Problem List





- Problems


(1) Severe sepsis


Code(s): A41.9 - SEPSIS, UNSPECIFIED ORGANISM; R65.20 - SEVERE SEPSIS WITHOUT 

SEPTIC SHOCK   





(2) DIPTI (obstructive sleep apnea)


Code(s): G47.33 - OBSTRUCTIVE SLEEP APNEA (ADULT) (PEDIATRIC)   





(3) HLD (hyperlipidemia)


Code(s): E78.5 - HYPERLIPIDEMIA, UNSPECIFIED   





(4) HTN (hypertension)


Code(s): I10 - ESSENTIAL (PRIMARY) HYPERTENSION   





(5) S/P aortic valve replacement 


Code(s): Z95.2 - PRESENCE OF PROSTHETIC HEART VALVE   





(6) Afib


Code(s): I48.91 - UNSPECIFIED ATRIAL FIBRILLATION   





plan


continue current mgmt


rest as per the team

## 2019-11-08 NOTE — PN
Progress Note, Physician


Chief Complaint: 





sitting in chair


C/o LBP


no CP, SOB, palps


Afeb








TELE: Reviewed. VCS, APCs. Rare V couplets





- Current Medication List


Current Medications: 


Active Medications





Acetaminophen (Tylenol -)  650 mg PO Q6H PRN


   PRN Reason: FEVER


   Last Admin: 11/08/19 01:44 Dose:  650 mg


Amlodipine Besylate (Norvasc -)  5 mg PO DAILY [facility]


   Last Admin: 11/08/19 09:40 Dose:  5 mg


Cyanocobalamin (Vitamin B12 -)  1,000 mcg PO DAILY [facility]


   Last Admin: 11/08/19 09:40 Dose:  1,000 mcg


Furosemide (Lasix Injection -)  40 mg IVPUSH BID@0600,1400 [facility]


   Last Admin: 11/08/19 06:37 Dose:  40 mg


Sodium Chloride (Normal Saline -)  1,000 mls @ 75 mls/hr IV ASDIR [facility]


   Last Admin: 11/07/19 19:45 Dose:  75 mls/hr


Lidocaine (Lidoderm Patch -)  1 patch TP DAILY [facility]


   Last Admin: 11/08/19 09:50 Dose:  1 patch


Melatonin (Melatonin)  3 mg PO HS [facility]


   Last Admin: 11/07/19 21:54 Dose:  3 mg


Methyl Salicylate (Cm-Jarrell -)  1 applic TP BID [facility]


   Last Admin: 11/08/19 09:44 Dose:  1 applic


Metoprolol Succinate (Toprol Xl -)  25 mg PO DAILY [facility]


   Last Admin: 11/08/19 09:40 Dose:  25 mg


Miscellaneous (Lidoderm Patch Removal)  1 each MC DAILY@2200 [facility]


   Last Admin: 11/07/19 21:54 Dose:  Not Given


Ondansetron HCl (Zofran Injection)  4 mg IVPUSH Q6H PRN


   PRN Reason: NAUSEA AND/OR VOMITING


   Last Admin: 11/07/19 18:01 Dose:  4 mg


Pantoprazole Sodium (Protonix -)  40 mg PO BID [facility]


   Last Admin: 11/08/19 09:40 Dose:  40 mg


Rosuvastatin Calcium (Crestor -)  5 mg PO HS [facility]


   Last Admin: 11/07/19 21:53 Dose:  5 mg


Tramadol HCl (Ultram -)  50 mg PO Q8H PRN


   PRN Reason: PAIN LEVEL 6-10


Valsartan (Diovan -)  160 mg PO DAILY [facility]


   Last Admin: 11/08/19 09:40 Dose:  160 mg











- Objective


Vital Signs: 


 Vital Signs











Temperature  98.4 F   11/08/19 09:02


 


Pulse Rate  67   11/08/19 09:02


 


Respiratory Rate  18   11/08/19 09:02


 


Blood Pressure  166/88   11/08/19 09:02


 


O2 Sat by Pulse Oximetry (%)  93 L  11/08/19 08:59











Constitutional: Yes: No Distress


Cardiovascular: Yes: Regular Rate and Rhythm


Respiratory: Yes: CTA Bilaterally


Gastrointestinal: Yes: Soft (NT)


Genitourinary: Yes: Other (no CVAT)


Edema: No


Neurological: Yes: Alert, Oriented


...Motor Strength: WNL


Labs: 


 CBC, BMP





 11/08/19 06:00 





 11/08/19 06:00 





 INR, PTT











INR  1.05  (0.83-1.09)   11/08/19  08:15    


 


Fibrinogen  229.0 mg/dL (238-498)  L  11/08/19  08:15    








Microbiology





11/06/19 14:20   Urine - Urine Clean Catch   Urine Culture - Final


                              NO GROWTH OBTAINED


11/03/19 12:23   Blood - Peripheral Venous   Blood Parasites Smear - Final


11/02/19 06:50   Urine - Urine Clean Catch   Legionella Antigen - Final


11/02/19 06:50   Urine - Urine Clean Catch   Streptococcus pneumoniae Antigen (

M - Final


11/01/19 19:45   Blood - Peripheral Venous   Blood Culture - Final


                              NO GROWTH AFTER 5 DAYS INCUBATION


11/04/19 11:43   Blood - Peripheral Venous   Blood Culture - Preliminary


                              NO GROWTH OBTAINED AFTER 72 HOURS, INCUBATION TO 

CONTINUE


                              FOR 2 DAYS.


11/04/19 11:30   Blood - Peripheral Venous   Blood Culture - Preliminary


                              NO GROWTH OBTAINED AFTER 72 HOURS, INCUBATION TO 

CONTINUE


                              FOR 2 DAYS.





 Laboratory Tests











  11/06/19 11/08/19 11/08/19





  05:50 06:00 06:00


 


WBC   4.7 


 


Hgb   13.6 


 


Plt Count   99 L 


 


Sodium    136


 


Creatinine    1.4 H


 


AST    113 H


 


ALT    152 H


 


Alkaline Phosphatase    444 H


 


Ammonia  < 10.00 L  














- ....Imaging


EKG: Image Reviewed





Assessment/Plan





Impression:





1.  Persisrent febrile state, of unknown etiology- now resolving. 


2. Hypertension, hypertensive cardiovascular disease.


3. Severe left ventricular diastolic dysfunction.


4. Chronic obstructive pulmonary disease.


5. Status post bioprosthetic aortic valve replacement.


6. Leukopenia and thrombocytopenia, probably related to sepsis vs viral syndrome


7. abnormal  LFTs.


8. Permanent atrial fib. with controlled ventricular response.


9. Small RUQ nontender abdominal mass.





Recommendations:


1.  Continue medications as outlined.


2.  Antibiotics as per ID. Possible viral syndrome. CT L-S spine was negative; 

defer further imaging to PMD/ID for his LBP (which seems chronic)


3.  GI f/u in progress.


4.  Eliquis held for thrombocytopenia; please resume when feasible.


5.  Heme f/u in progress.





Prognosis: Guarded.

## 2019-11-08 NOTE — PN
Progress Note (short form)





- Note


Progress Note: 





Patient seen 


Discussed with wife at bedside 


Increasing confusion and disorientation 


CBC improving with normalization of WBC and diff, improvement in platelets


 LFT's abnormal 


Low grade temps 


IL-2 elevated , SANTOS and Rheumatoid factor -negative 


Sono- non revealing 





Agree with plan for liver biopsy if heme parameters acceptable and if LFT's 

remain abnormal  or continue to worsen  without obvious etiology. 





A

## 2019-11-08 NOTE — PN.GI
GI Progress Note


Subjective: 





GI NOte: Has nausea and intermittent vomiting. LFTs and platelet count are 

improving but low grade intermittent fevers persist. Hepatitis workup 

unremarkable so far. He has not returned to his cognitive baseline and is prone 

to anxious outbursts.  He responds yes and no to abdominal pain. Repeat abd 

sonogram unremarkable. 





- Objective


Vital Signs: 


 Vital Signs











Temperature  98.4 F   11/08/19 09:02


 


Pulse Rate  67   11/08/19 09:02


 


Respiratory Rate  18   11/08/19 09:02


 


Blood Pressure  166/88   11/08/19 09:02


 


O2 Sat by Pulse Oximetry (%)  93 L  11/08/19 08:59











Constitutional: Anxious, Other (confused)


Labs: 


 CBC, BMP





 11/08/19 06:00 





 11/08/19 06:00 





 INR, PTT











INR  1.05  (0.83-1.09)   11/08/19  08:15    


 


Fibrinogen  229.0 mg/dL (238-498)  L  11/08/19  08:15    








 Laboratory Tests











  11/04/19 11/04/19 11/06/19





  05:50 05:50 00:50


 


WBC    2.7 L


 


Plt Count    57 L


 


PT with INR  20.80 H  


 


Iron   34 L 


 


Iron Saturation   14 L 


 


Ferritin   1871.8 H 


 


Total Bilirubin   


 


AST   


 


ALT   


 


Alkaline Phosphatase   


 


Ammonia   


 


LD Total   


 


C-Reactive Protein   


 


SANTOS Screen   


 


Hep A IgM Ab Confirm   


 


Hepatitis A Ab Total   


 


Hep Bs Antigen   


 


Hep Bs Antibody   


 


Hep B Core Total Ab   


 


Hep B Core IgM Ab   


 


Hepatitis Be Antibody   


 


Hepatitis Be Antigen   


 


Hep C Ab Diagnostic   














  11/06/19 11/06/19 11/06/19





  00:50 00:50 00:50


 


WBC   


 


Plt Count   


 


PT with INR   


 


Iron   


 


Iron Saturation   


 


Ferritin   


 


Total Bilirubin  2.6 H  


 


AST  281 H  


 


ALT  182 H  


 


Alkaline Phosphatase  473 H  


 


Ammonia   


 


LD Total   382 H 


 


C-Reactive Protein   2.1 H 


 


SANTOS Screen    Negative


 


Hep A IgM Ab Confirm   


 


Hepatitis A Ab Total   


 


Hep Bs Antigen   


 


Hep Bs Antibody   


 


Hep B Core Total Ab   


 


Hep B Core IgM Ab   


 


Hepatitis Be Antibody   


 


Hepatitis Be Antigen   


 


Hep C Ab Diagnostic   














  11/06/19 11/06/19 11/06/19





  00:50 00:50 05:50


 


WBC   


 


Plt Count   


 


PT with INR  13.30 H  


 


Iron   


 


Iron Saturation   


 


Ferritin   


 


Total Bilirubin   


 


AST   


 


ALT   


 


Alkaline Phosphatase   


 


Ammonia    < 10.00 L


 


LD Total   


 


C-Reactive Protein   


 


SANTOS Screen   


 


Hep A IgM Ab Confirm   Negative 


 


Hepatitis A Ab Total   Negative 


 


Hep Bs Antigen   Negative 


 


Hep Bs Antibody   Non reactive 


 


Hep B Core Total Ab   Negative 


 


Hep B Core IgM Ab   Negative 


 


Hepatitis Be Antibody   Negative 


 


Hepatitis Be Antigen   Negative 


 


Hep C Ab Diagnostic   0.2 














  11/08/19 11/08/19





  06:00 06:00


 


WBC  4.7 


 


Plt Count  99 L 


 


PT with INR  


 


Iron  


 


Iron Saturation  


 


Ferritin  


 


Total Bilirubin   2.2 H


 


AST   113 H


 


ALT   152 H


 


Alkaline Phosphatase   444 H


 


Ammonia  


 


LD Total   281 H


 


C-Reactive Protein  


 


SANTOS Screen  


 


Hep A IgM Ab Confirm  


 


Hepatitis A Ab Total  


 


Hep Bs Antigen  


 


Hep Bs Antibody  


 


Hep B Core Total Ab  


 


Hep B Core IgM Ab  


 


Hepatitis Be Antibody  


 


Hepatitis Be Antigen  


 


Hep C Ab Diagnostic  














Assessment/Plan





Assessment:


- Given the fever and abnormal LFTs I will order a AFP to exclude a hepatoma. 

Underlying cirrhosis cannot be excluded. When his renal function permits a CT 

with contrast will be ordered to exclude a hepatoma and abscesses. If his LFTs 

fail to normalize he may need a liver biopsy.  Doubt hepatic encephalopathy. 

His N/V could be cholecystitis and if they continue a Hida scan should be 

ordered. 


- Personal h/o a colon adenoma and diverticulosis


- FH stomach cancer





Plan:


-- AFP, Fibrosure


-- Follow LFTs and platelet count. 


-- Doubt hepatic encephalopathy.


-- With platelets rising and INR normalized will resume Eliquis


-- CT with contrast when renal function permits  


-- Hida scan if N/V persist





 








Problem List





- Problems


(1) Abnormal liver enzymes


Code(s): R74.8 - ABNORMAL LEVELS OF OTHER SERUM ENZYMES   





(2) Gallstone


Code(s): K80.20 - CALCULUS OF GALLBLADDER W/O CHOLECYSTITIS W/O OBSTRUCTION   





(3) S/P aortic valve replacement


Code(s): Z95.2 - PRESENCE OF PROSTHETIC HEART VALVE   





(4) Colon adenoma


Code(s): D12.6 - BENIGN NEOPLASM OF COLON, UNSPECIFIED   





(5) Diverticulosis


Code(s): K57.90 - DVRTCLOS OF INTEST, PART UNSP, W/O PERF OR ABSCESS W/O BLEED 

  





(6) History of prostate cancer


Code(s): Z85.46 - PERSONAL HISTORY OF MALIGNANT NEOPLASM OF PROSTATE   





(7) Afib


Code(s): I48.91 - UNSPECIFIED ATRIAL FIBRILLATION   





(8) Altered mental status


Code(s): R41.82 - ALTERED MENTAL STATUS, UNSPECIFIED   





(9) Leukopenia


Code(s): D72.819 - DECREASED WHITE BLOOD CELL COUNT, UNSPECIFIED   





(10) Severe sepsis


Code(s): A41.9 - SEPSIS, UNSPECIFIED ORGANISM; R65.20 - SEVERE SEPSIS WITHOUT 

SEPTIC SHOCK   





(11) Thrombocytopenia


Code(s): D69.6 - THROMBOCYTOPENIA, UNSPECIFIED   





(12) HLD (hyperlipidemia)


Code(s): E78.5 - HYPERLIPIDEMIA, UNSPECIFIED   





(13) HTN (hypertension)


Code(s): I10 - ESSENTIAL (PRIMARY) HYPERTENSION   





(14) Family history of gastric cancer


Code(s): Z80.0 - FAMILY HISTORY OF MALIGNANT NEOPLASM OF DIGESTIVE ORGANS

## 2019-11-08 NOTE — PN
Progress Note, Physician


History of Present Illness: 





PULMONARY 





ALERT,OOB-CHAIR,LESS DYSPNEIC,-CP





- Current Medication List


Current Medications: 


Active Medications





Acetaminophen (Tylenol -)  650 mg PO Q6H PRN


   PRN Reason: FEVER


   Last Admin: 11/08/19 01:44 Dose:  650 mg


Amlodipine Besylate (Norvasc -)  5 mg PO DAILY [facility]


   Last Admin: 11/08/19 09:40 Dose:  5 mg


Cyanocobalamin (Vitamin B12 -)  1,000 mcg PO DAILY [facility]


   Last Admin: 11/08/19 09:40 Dose:  1,000 mcg


Furosemide (Lasix Injection -)  40 mg IVPUSH BID@0600,1400 [facility]


   Last Admin: 11/08/19 06:37 Dose:  40 mg


Sodium Chloride (Normal Saline -)  1,000 mls @ 75 mls/hr IV ASDIR [facility]


   Last Admin: 11/07/19 19:45 Dose:  75 mls/hr


Lidocaine (Lidoderm Patch -)  1 patch TP DAILY [facility]


   Last Admin: 11/08/19 09:50 Dose:  1 patch


Melatonin (Melatonin)  3 mg PO HS [facility]


   Last Admin: 11/07/19 21:54 Dose:  3 mg


Methyl Salicylate (Cm-Jarrell -)  1 applic TP BID [facility]


   Last Admin: 11/08/19 09:44 Dose:  1 applic


Metoprolol Succinate (Toprol Xl -)  25 mg PO DAILY [facility]


   Last Admin: 11/08/19 09:40 Dose:  25 mg


Miscellaneous (Lidoderm Patch Removal)  1 each MC DAILY@2200 [facility]


   Last Admin: 11/07/19 21:54 Dose:  Not Given


Ondansetron HCl (Zofran Injection)  4 mg IVPUSH Q6H PRN


   PRN Reason: NAUSEA AND/OR VOMITING


   Last Admin: 11/07/19 18:01 Dose:  4 mg


Pantoprazole Sodium (Protonix -)  40 mg PO BID [facility]


   Last Admin: 11/08/19 09:40 Dose:  40 mg


Rosuvastatin Calcium (Crestor -)  5 mg PO HS [facility]


   Last Admin: 11/07/19 21:53 Dose:  5 mg


Tramadol HCl (Ultram -)  50 mg PO Q8H PRN


   PRN Reason: PAIN LEVEL 6-10


Valsartan (Diovan -)  160 mg PO DAILY [facility]


   Last Admin: 11/08/19 09:40 Dose:  160 mg











- Objective


Vital Signs: 


 Vital Signs











Temperature  98.4 F   11/08/19 09:02


 


Pulse Rate  67   11/08/19 09:02


 


Respiratory Rate  18   11/08/19 09:02


 


Blood Pressure  166/88   11/08/19 09:02


 


O2 Sat by Pulse Oximetry (%)  93 L  11/08/19 08:59











Constitutional: Yes: Well Nourished, Calm


Eyes: Yes: WNL


HENT: Yes: WNL


Neck: Yes: WNL


Cardiovascular: Yes: Pulse Irregular, S1, S2


Respiratory: Yes: Diminished


Gastrointestinal: Yes: Normal Bowel Sounds, Soft


Extremities: Yes: WNL


Edema: No


Labs: 


 CBC, BMP





 11/08/19 06:00 





 11/08/19 06:00 





 INR, PTT











INR  1.05  (0.83-1.09)   11/08/19  08:15    


 


Fibrinogen  229.0 mg/dL (238-498)  L  11/08/19  08:15    














Problem List





- Problems


(1) Altered mental status


Code(s): R41.82 - ALTERED MENTAL STATUS, UNSPECIFIED   





(2) Abnormal liver enzymes


Code(s): R74.8 - ABNORMAL LEVELS OF OTHER SERUM ENZYMES   





(3) Afib


Code(s): I48.91 - UNSPECIFIED ATRIAL FIBRILLATION   





(4) Leukopenia


Code(s): D72.819 - DECREASED WHITE BLOOD CELL COUNT, UNSPECIFIED   





(5) DIPTI (obstructive sleep apnea)


Code(s): G47.33 - OBSTRUCTIVE SLEEP APNEA (ADULT) (PEDIATRIC)   





(6) Thrombocytopenia


Code(s): D69.6 - THROMBOCYTOPENIA, UNSPECIFIED   





(7) Weakness


Code(s): R53.1 - WEAKNESS   





(8) HLD (hyperlipidemia)


Code(s): E78.5 - HYPERLIPIDEMIA, UNSPECIFIED   





(9) HTN (hypertension)


Code(s): I10 - ESSENTIAL (PRIMARY) HYPERTENSION   





(10) S/P aortic valve replacement


Code(s): Z95.2 - PRESENCE OF PROSTHETIC HEART VALVE   





Assessment/Plan








ASSESSMENT:


Fever of unknown origin with altered mental status improved. HARRIETT NO ENDOCARDITIS


Toxic Metabolic Encephalopathy improved


Low clinical suspicion of Meningitis 


Sepsis


Leukopenia


AF on chronic AC


AS s/p porcine valve 2009


HTN


CHF


DIPTI


COPD


CKD


ELEVATED LFTS


MEDIASTINAL ADENOPATHY





PLAN:





-Lasix


-BD TX PRN 


-CPAP at night and when sleeping


-Oxygen 


-Rate control


-Monitor LFTS,cbc


- F/U CHEST CT outpatient 








DR HAGEN

## 2019-11-09 LAB
ALBUMIN SERPL-MCNC: 3 G/DL (ref 3.4–5)
ALBUMIN SERPL-MCNC: 3.1 G/DL (ref 3.4–5)
ALP SERPL-CCNC: 452 U/L (ref 45–117)
ALP SERPL-CCNC: 456 U/L (ref 45–117)
ALT SERPL-CCNC: 117 U/L (ref 13–61)
ALT SERPL-CCNC: 117 U/L (ref 13–61)
ANION GAP SERPL CALC-SCNC: 8 MMOL/L (ref 8–16)
APPEARANCE UR: CLEAR
AST SERPL-CCNC: 65 U/L (ref 15–37)
AST SERPL-CCNC: 66 U/L (ref 15–37)
BACTERIA # UR AUTO: 0.3 /HPF
BASOPHILS # BLD: 0.2 % (ref 0–2)
BILIRUB CONJ SERPL-MCNC: 1.3 MG/DL (ref 0–0.2)
BILIRUB SERPL-MCNC: 1.8 MG/DL (ref 0.2–1)
BILIRUB SERPL-MCNC: 1.9 MG/DL (ref 0.2–1)
BILIRUB UR STRIP.AUTO-MCNC: NEGATIVE MG/DL
BUN SERPL-MCNC: 32.4 MG/DL (ref 7–18)
CALCIUM SERPL-MCNC: 9.4 MG/DL (ref 8.5–10.1)
CASTS URNS QL MICRO: 10 /LPF (ref 0–8)
CHLORIDE SERPL-SCNC: 101 MMOL/L (ref 98–107)
CO2 SERPL-SCNC: 28 MMOL/L (ref 21–32)
COLOR UR: (no result)
CREAT SERPL-MCNC: 1.1 MG/DL (ref 0.55–1.3)
DEPRECATED RDW RBC AUTO: 14 % (ref 11.9–15.9)
EOSINOPHIL # BLD: 1.1 % (ref 0–4.5)
EPITH CASTS URNS QL MICRO: 1.4 /HPF
GGT SERPL-CCNC: 421 U/L (ref 5–85)
GLUCOSE SERPL-MCNC: 141 MG/DL (ref 74–106)
HCT VFR BLD CALC: 38.9 % (ref 35.4–49)
HGB BLD-MCNC: 13.1 GM/DL (ref 11.7–16.9)
KETONES UR QL STRIP: NEGATIVE
LEUKOCYTE ESTERASE UR QL STRIP.AUTO: NEGATIVE
LYMPHOCYTES # BLD: 6.4 % (ref 8–40)
MAGNESIUM SERPL-MCNC: 2.1 MG/DL (ref 1.8–2.4)
MCH RBC QN AUTO: 29.6 PG (ref 25.7–33.7)
MCHC RBC AUTO-ENTMCNC: 33.6 G/DL (ref 32–35.9)
MCV RBC: 88.1 FL (ref 80–96)
MONOCYTES # BLD AUTO: 10.7 % (ref 3.8–10.2)
NEUTROPHILS # BLD: 81.6 % (ref 42.8–82.8)
NITRITE UR QL STRIP: NEGATIVE
PH UR: 5 [PH] (ref 5–8)
PLATELET # BLD AUTO: 123 K/MM3 (ref 134–434)
PMV BLD: 8.9 FL (ref 7.5–11.1)
POTASSIUM SERPLBLD-SCNC: 3.2 MMOL/L (ref 3.5–5.1)
PROT SERPL-MCNC: 6.1 G/DL (ref 6.4–8.2)
PROT SERPL-MCNC: 6.3 G/DL (ref 6.4–8.2)
PROT UR QL STRIP: (no result)
PROT UR QL STRIP: NEGATIVE
RBC # BLD AUTO: 2 /HPF (ref 0–4)
RBC # BLD AUTO: 4.41 M/MM3 (ref 4–5.6)
SODIUM SERPL-SCNC: 137 MMOL/L (ref 136–145)
SP GR UR: 1.02 (ref 1.01–1.03)
UROBILINOGEN UR STRIP-MCNC: 1 MG/DL (ref 0.2–1)
WBC # BLD AUTO: 5.3 K/MM3 (ref 4–10)
WBC # UR AUTO: 1 /HPF (ref 0–5)

## 2019-11-09 RX ADMIN — ANALGESIC BALM SCH APPLIC: 1.74; 4.06 OINTMENT TOPICAL at 10:56

## 2019-11-09 RX ADMIN — LACTULOSE SCH GM: 20 SOLUTION ORAL at 22:21

## 2019-11-09 RX ADMIN — VALSARTAN SCH MG: 160 TABLET, FILM COATED ORAL at 10:55

## 2019-11-09 RX ADMIN — QUETIAPINE FUMARATE SCH MG: 25 TABLET ORAL at 22:21

## 2019-11-09 RX ADMIN — FUROSEMIDE SCH MG: 10 INJECTION, SOLUTION INTRAVENOUS at 14:12

## 2019-11-09 RX ADMIN — APIXABAN SCH MG: 2.5 TABLET, FILM COATED ORAL at 10:56

## 2019-11-09 RX ADMIN — ANALGESIC BALM SCH APPLIC: 1.74; 4.06 OINTMENT TOPICAL at 22:22

## 2019-11-09 RX ADMIN — ACETAMINOPHEN PRN MG: 10 INJECTION, SOLUTION INTRAVENOUS at 17:16

## 2019-11-09 RX ADMIN — PANTOPRAZOLE SODIUM SCH MG: 40 TABLET, DELAYED RELEASE ORAL at 22:21

## 2019-11-09 RX ADMIN — AMLODIPINE BESYLATE SCH MG: 5 TABLET ORAL at 10:55

## 2019-11-09 RX ADMIN — ROSUVASTATIN CALCIUM SCH MG: 5 TABLET, FILM COATED ORAL at 22:21

## 2019-11-09 RX ADMIN — PANTOPRAZOLE SODIUM SCH MG: 40 TABLET, DELAYED RELEASE ORAL at 10:55

## 2019-11-09 RX ADMIN — LIDOCAINE SCH PATCH: 50 PATCH TOPICAL at 10:58

## 2019-11-09 RX ADMIN — Medication SCH: at 22:31

## 2019-11-09 RX ADMIN — CYANOCOBALAMIN TAB 1000 MCG SCH MCG: 1000 TAB at 10:55

## 2019-11-09 RX ADMIN — FUROSEMIDE SCH MG: 10 INJECTION, SOLUTION INTRAVENOUS at 06:37

## 2019-11-09 RX ADMIN — Medication SCH MG: at 22:21

## 2019-11-09 RX ADMIN — LACTULOSE SCH GM: 20 SOLUTION ORAL at 10:55

## 2019-11-09 NOTE — PN
Progress Note (short form)





- Note


Progress Note: 








SUBJECTIVE:





Patient seen and examined 


Course reviewed 


Sleeping but arousable and appropriate





 Last Vital Signs











Temp Pulse Resp BP Pulse Ox


 


 101.9 F H  80   16   146/79   95 


 


 11/09/19 17:00  11/09/19 13:30  11/09/19 13:30  11/09/19 13:30  11/09/19 09:00














HEENT: FLORENTINO, EOM Intact


Oropharynx: No thrush, No mucositis


Neck: Supple


Nodes: Without adenopathy


Cor: irregular rhythm 


Lungs: Clear to P&A


Abd: Soft, Normal bowel sounds, No organomegaly, abdominal mass left upper 

quadrant


testes descende, circumcised  


Ext:No significant edema


Skin: No rashes, Integument intact


  





 11/09/19 06:00 





 11/09/19 06:00 











 Current Medications











Generic Name Dose Route Start Last Admin





  Trade Name Freq  PRN Reason Stop Dose Admin


 


Acetaminophen  750 mg  11/09/19 17:07  11/09/19 17:16





  Ofirmev Injection -  IVPB   750 mg





  Q12H PRN   Administration





  FEVER   





     





     





     


 


Amlodipine Besylate  5 mg  11/05/19 10:00  11/09/19 10:55





  Norvasc -  PO   5 mg





  DAILY LARISA   Administration





     





     





     





     


 


Cyanocobalamin  1,000 mcg  11/05/19 10:00  11/09/19 10:55





  Vitamin B12 -  PO   1,000 mcg





  DAILY LARISA   Administration





     





     





     





     


 


Furosemide  40 mg  11/10/19 06:00  





  Lasix -  PO   





  BID@0600,1400 [facility]   





     





     





     





     


 


Sodium Chloride  1,000 mls @ 100 mls/hr  11/09/19 17:15  11/09/19 17:15





  Normal Saline -  IV   100 mls/hr





  ASDIR LARISA   Administration





     





     





     





     


 


Lactulose  20 gm  11/08/19 22:00  11/09/19 10:55





  Cephulac (Oral Use)  PO   20 gm





  BID LARISA   Administration





     





     





     





     


 


Lidocaine  1 patch  11/07/19 14:00  11/09/19 10:58





  Lidoderm Patch -  TP   1 patch





  DAILY [facility]   Administration





     





     





     





     


 


Melatonin  3 mg  11/07/19 22:00  11/08/19 21:49





  Melatonin  PO   3 mg





  HS [facility]   Administration





     





     





     





     


 


Methyl Salicylate  1 applic  11/07/19 10:00  11/09/19 10:56





  Cm-Jarrell -  TP   1 applic





  BID [facility]   Administration





     





     





     





     


 


Metoprolol Succinate  25 mg  11/05/19 10:00  11/09/19 10:56





  Toprol Xl -  PO   25 mg





  DAILY [facility]   Administration





     





     





     





     


 


Miscellaneous  1 each  11/07/19 22:00  11/08/19 21:49





  Lidoderm Patch Removal  MC   1 each





  DAILY@2200 [facility]   Administration





     





     





     





     


 


Ondansetron HCl  4 mg  11/07/19 17:27  11/07/19 18:01





  Zofran Injection  IVPUSH   4 mg





  Q6H PRN   Administration





  NAUSEA AND/OR VOMITING   





     





     





     


 


Pantoprazole Sodium  40 mg  11/05/19 22:00  11/09/19 10:55





  Protonix -  PO   40 mg





  BID LARISA   Administration





     





     





     





     


 


Quetiapine Fumarate  12.5 mg  11/09/19 20:00  





  Seroquel -  PO   





  DAILY@2000 [facility]   





     





     





     





     


 


Rosuvastatin Calcium  5 mg  11/04/19 22:00  11/08/19 21:47





  Crestor -  PO   5 mg





  HS LARISA   Administration





     





     





     





     


 


Tramadol HCl  50 mg  11/08/19 09:48  11/08/19 21:47





  Ultram -  PO   50 mg





  Q8H PRN   Administration





  PAIN LEVEL 6-10   





     





     





     


 


Valsartan  160 mg  11/05/19 10:00  11/09/19 10:55





  Diovan -  PO   160 mg





  DAILY [facility]   Administration





     





     





     





     














Impression:





Picture suggestive of infectious process. Spiking. Agree with CT CAP


Neutropenia/ thombocytopenia resolved (slight TCP though)


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

) syndrome, now cytopenias resolved and LFTs are mostly stable/normalizing but 

TBili slightly up. Close monitoring and if no resolution consider liver biopsy.


Pt has mediastinal LAD. Will need to consider biopsy at some point.








Problem List





- Problems


(1) Leukopenia


Code(s): D72.819 - DECREASED WHITE BLOOD CELL COUNT, UNSPECIFIED   





(2) Severe sepsis


Code(s): A41.9 - SEPSIS, UNSPECIFIED ORGANISM; R65.20 - SEVERE SEPSIS WITHOUT 

SEPTIC SHOCK   





(3) Thrombocytopenia


Code(s): D69.6 - THROMBOCYTOPENIA, UNSPECIFIED

## 2019-11-09 NOTE — PN
Progress Note, Physician


Chief Complaint: 





Resting in bed. slept better last night


History of Present Illness: 





Patient is an 84 year old male with a significant past medical history of 

hypertension, hyperlipidemia, prosthetic aortic valve replacement (bovine). 

afib (on eliquis).   He presented to the [facility] ED with ataxia, chills, rigors and 

weakness.  patient had rigors at home and brought into the ED by his family for 

further evaluation. 








- Current Medication List


Current Medications: 


Active Medications





Acetaminophen (Tylenol -)  650 mg PO Q6H PRN


   PRN Reason: FEVER


   Last Admin: 11/08/19 01:44 Dose:  650 mg


Amlodipine Besylate (Norvasc -)  5 mg PO DAILY [facility]


   Last Admin: 11/08/19 09:40 Dose:  5 mg


Apixaban (Eliquis -)  2.5 mg PO BID [facility]


   Last Admin: 11/08/19 21:47 Dose:  2.5 mg


Cyanocobalamin (Vitamin B12 -)  1,000 mcg PO DAILY [facility]


   Last Admin: 11/08/19 09:40 Dose:  1,000 mcg


Furosemide (Lasix Injection -)  40 mg IVPUSH BID@0600,1400 [facility]


   Last Admin: 11/09/19 06:37 Dose:  40 mg


Sodium Chloride (Normal Saline -)  1,000 mls @ 42 mls/hr IV ASDIR [facility]


   Last Admin: 11/09/19 02:00 Dose:  42 mls/hr


Lactulose (Cephulac (Oral Use))  20 gm PO BID [facility]


   Last Admin: 11/08/19 21:48 Dose:  20 gm


Lidocaine (Lidoderm Patch -)  1 patch TP DAILY [facility]


   Last Admin: 11/08/19 09:50 Dose:  1 patch


Melatonin (Melatonin)  3 mg PO HS [facility]


   Last Admin: 11/08/19 21:49 Dose:  3 mg


Methyl Salicylate (Cm-Jarrell -)  1 applic TP BID [facility]


   Last Admin: 11/08/19 21:48 Dose:  1 applic


Metoprolol Succinate (Toprol Xl -)  25 mg PO DAILY [facility]


   Last Admin: 11/08/19 09:40 Dose:  25 mg


Miscellaneous (Lidoderm Patch Removal)  1 each MC DAILY@2200 [facility]


   Last Admin: 11/08/19 21:49 Dose:  1 each


Ondansetron HCl (Zofran Injection)  4 mg IVPUSH Q6H PRN


   PRN Reason: NAUSEA AND/OR VOMITING


   Last Admin: 11/07/19 18:01 Dose:  4 mg


Pantoprazole Sodium (Protonix -)  40 mg PO BID [facility]


   Last Admin: 11/08/19 21:47 Dose:  40 mg


Rosuvastatin Calcium (Crestor -)  5 mg PO HS [facility]


   Last Admin: 11/08/19 21:47 Dose:  5 mg


Tramadol HCl (Ultram -)  50 mg PO Q8H PRN


   PRN Reason: PAIN LEVEL 6-10


   Last Admin: 11/08/19 21:47 Dose:  50 mg


Valsartan (Diovan -)  160 mg PO DAILY [facility]


   Last Admin: 11/08/19 09:40 Dose:  160 mg











- Objective


Vital Signs: 


 Vital Signs











Temperature  98 F   11/09/19 07:00


 


Pulse Rate  74   11/09/19 07:00


 


Respiratory Rate  18   11/09/19 07:00


 


Blood Pressure  148/89   11/09/19 07:00


 


O2 Sat by Pulse Oximetry (%)  93 L  11/08/19 21:00











Additional Findings/Remarks: 





Constitutional: Yes: Well Nourished, No Distress, Calm


Eyes: Yes: WNL, Conjunctiva Clear


HENT: Yes: WNL, Atraumatic, Normocephalic


Neck: Yes: WNL, Supple, Trachea Midline


Cardiovascular: Yes: WNL, Regular Rate and Rhythm


Respiratory: Yes: Diminished at bases


Gastrointestinal: Yes: WNL, Normal Bowel Sounds, Soft, Abdomen, Obese. No 

palpable masses noted


...Rectal Exam: Yes: Deferred


Genitourinary: Yes: WNL


Breast(s): Yes: WNL


Musculoskeletal: Yes: WNL


Extremities: Yes: WNL


Edema: No


Peripheral Pulses WNL: Yes


Peripheral Pulses: Left Radial: 2+, Right Radial: 2+, Left Doralis Pedis: 2+, 

Right Dorsalis Pedis: 2+, Left Femoral: 2+, Right Femoral: 2+


Neurological: Yes:no confusion


...Motor Strength: WNL (generalized weakness)


Psychiatric: Yes: WNL


Labs: 


 INR, PTT











INR  1.05  (0.83-1.09)   11/08/19  08:15    


 


Fibrinogen  229.0 mg/dL (238-498)  L  11/08/19  08:15    














Problem List





- Problems


(1) Abnormal liver enzymes


Assessment/Plan: 


LFTs continue to trend down, AST/ALT  65/117


NH3 18.5


US with hepatomegaly-fatty liver vs Hepatocellular 


US without mass 


avoid hepatotoxic agents


MRCP revealing no CBD stones, GB stone but no cholecystitis. No need for ERCP


Code(s): R74.8 - ABNORMAL LEVELS OF OTHER SERUM ENZYMES   





(2) Afib


Assessment/Plan: 


Rate controlled


c/w toprol


Eliquis restarted. Will pay close attention to plt counts 


tele monitoring


Code(s): I48.91 - UNSPECIFIED ATRIAL FIBRILLATION   





(3) Altered mental status


Assessment/Plan: 


no confusion this morning


c/w melatonin


fall precautions


if confused overnight will start low dose seroquel 12.5mg qhs


Code(s): R41.82 - ALTERED MENTAL STATUS, UNSPECIFIED   





(4) Leukopenia


Assessment/Plan: 


WBC 5.3 today


neutropenia of unclear etiology, but continues to resolve


viral panel negative, rpr negative, blood smear negative


leukopenia may have been due to acute infection that was adequately treated . 

All Cx are NGTD but still having low grade daily fevers. T max 100.0@ 2200


daily cbc 


heme consultation appreciated





Code(s): D72.819 - DECREASED WHITE BLOOD CELL COUNT, UNSPECIFIED   





(5) DIPTI (obstructive sleep apnea)


Assessment/Plan: 


c/w CPAP prn and at night


F/U CHEST CT outpatient 


Code(s): G47.33 - OBSTRUCTIVE SLEEP APNEA (ADULT) (PEDIATRIC)   





(6) Prophylactic measure


Assessment/Plan: 


FEN


cardiac diet


no additional IVF needed


monitor electrolytes








DVT


eliquis restared





Dispo


maintain as in patient


full code


discharge planning- family requested outpt PT to continue on discharge


Code(s): Z29.9 - ENCOUNTER FOR PROPHYLACTIC MEASURES, UNSPECIFIED   





(7) Severe sepsis


Assessment/Plan: 


presented with severe sepsis with neutropenia, fevers, malaise, and fatigue/

weakness.  


now with improving leukopenia and thrombocytopenia 


Fever of unknown origin, off abx  COntinue to monitor off. T max 24H 99.9


tick borne disease panel /viral negative


leptospira pending 


chest ct shows mediastinal adenopaty with moderate increase since prior exam


CT with gall stones, no acute lb seen, spine CT does not explain source of 

fever


HARRIETT with no vegetations


Code(s): A41.9 - SEPSIS, UNSPECIFIED ORGANISM; R65.20 - SEVERE SEPSIS WITHOUT 

SEPTIC SHOCK   





(8) Thrombocytopenia


Assessment/Plan: 


plt ct 123


no signs of bleeding, will monitor


ESR/CRP .8 /SANTOS/RF pending


eliquis restarted


appreciate hematology consultation





Code(s): D69.6 - THROMBOCYTOPENIA, UNSPECIFIED   





(9) Weakness


Assessment/Plan: 


improving with PT, ambulating the hendricks with assistance of walker


fall precautions


family/pt would like to continue outpatient PT on discharge


Code(s): R53.1 - WEAKNESS   





Visit type





- Emergency Visit


Emergency Visit: Yes


ED Registration Date: 11/01/19


Care time: The patient presented to the Emergency Department on the above date 

and was hospitalized for further evaluation of their emergent condition.





- New Patient


This patient is new to me today: No





- Critical Care


Critical Care patient: No





- Discharge Referral


Referred to [facility] No

## 2019-11-09 NOTE — PN
Progress Note (short form)





- Note


Progress Note: 


s: no cp sob palps dizzy








TELE: afib, rate ok





-


 Current Medications











Generic Name Dose Route Start Last Admin





  Trade Name Catracho  PRN Reason Stop Dose Admin


 


Acetaminophen  650 mg  11/04/19 21:35  11/08/19 01:44





  Tylenol -  PO   650 mg





  Q6H PRN   Administration





  FEVER   





     





     





     


 


Amlodipine Besylate  5 mg  11/05/19 10:00  11/08/19 09:40





  Norvasc -  PO   5 mg





  DAILY [facility]   Administration





     





     





     





     


 


Apixaban  2.5 mg  11/08/19 22:00  11/08/19 21:47





  Eliquis -  PO   2.5 mg





  BID [facility]   Administration





     





     





     





     


 


Cyanocobalamin  1,000 mcg  11/05/19 10:00  11/08/19 09:40





  Vitamin B12 -  PO   1,000 mcg





  DAILY [facility]   Administration





     





     





     





     


 


Furosemide  40 mg  11/05/19 06:00  11/09/19 06:37





  Lasix Injection -  IVPUSH   40 mg





  BID@0600,1400 [facility]   Administration





     





     





     





     


 


Sodium Chloride  1,000 mls @ 42 mls/hr  11/09/19 01:57  11/09/19 02:00





  Normal Saline -  IV   42 mls/hr





  ASDIR [facility]   Administration





     





     





     





     


 


Lactulose  20 gm  11/08/19 22:00  11/08/19 21:48





  Cephulac (Oral Use)  PO   20 gm





  BID [facility]   Administration





     





     





     





     


 


Lidocaine  1 patch  11/07/19 14:00  11/08/19 09:50





  Lidoderm Patch -  TP   1 patch





  DAILY [facility]   Administration





     





     





     





     


 


Melatonin  3 mg  11/07/19 22:00  11/08/19 21:49





  Melatonin  PO   3 mg





  HS [facility]   Administration





     





     





     





     


 


Methyl Salicylate  1 applic  11/07/19 10:00  11/08/19 21:48





  Cm-Jarrell -  TP   1 applic





  BID [facility]   Administration





     





     





     





     


 


Metoprolol Succinate  25 mg  11/05/19 10:00  11/08/19 09:40





  Toprol Xl -  PO   25 mg





  DAILY [facility]   Administration





     





     





     





     


 


Miscellaneous  1 each  11/07/19 22:00  11/08/19 21:49





  Lidoderm Patch Removal  MC   1 each





  DAILY@2200 [facility]   Administration





     





     





     





     


 


Ondansetron HCl  4 mg  11/07/19 17:27  11/07/19 18:01





  Zofran Injection  IVPUSH   4 mg





  Q6H PRN   Administration





  NAUSEA AND/OR VOMITING   





     





     





     


 


Pantoprazole Sodium  40 mg  11/05/19 22:00  11/08/19 21:47





  Protonix -  PO   40 mg





  BID [facility]   Administration





     





     





     





     


 


Potassium Chloride  20 meq  11/09/19 14:00  





  K-Dur -  PO  11/09/19 14:01  





  ONCE@1400 ONE   





     





     





     





     


 


Rosuvastatin Calcium  5 mg  11/04/19 22:00  11/08/19 21:47





  Crestor -  PO   5 mg





  HS [facility]   Administration





     





     





     





     


 


Tramadol HCl  50 mg  11/08/19 09:48  11/08/19 21:47





  Ultram -  PO   50 mg





  Q8H PRN   Administration





  PAIN LEVEL 6-10   





     





     





     


 


Valsartan  160 mg  11/05/19 10:00  11/08/19 09:40





  Diovan -  PO   160 mg





  DAILY [facility]   Administration





     





     





     





     








 Vital Signs











 Period  Temp  Pulse  Resp  BP Sys/Rosales  Pulse Ox


 


 Last 24 Hr  98 F-99.9 F  72-85  16-18  146-183/75-92  93-95











Constitutional: Yes: No Distress


Cardiovascular: Yes: irreg


Respiratory: Yes: CTA Bilaterally


Gastrointestinal: Yes: Soft (NT)


Genitourinary: Yes: Other (no CVAT)


Edema: No


Neurological: Yes: Alert, Oriented


no jaundice diaphoresis





Labs: 


  CBC, BMP





 11/09/19 06:00 





 11/09/19 06:00 














- ....Imaging


EKG: Image Reviewed





Assessment/Plan





Impression:





1.  Persistent febrile state, of unknown etiology- now resolving. 


2. Hypertension, hypertensive cardiovascular disease.


3. Severe left ventricular diastolic dysfunction.


4. Chronic obstructive pulmonary disease.


5. Status post bioprosthetic aortic valve replacement.


6. Leukopenia and thrombocytopenia, probably related to sepsis vs viral syndrome


7. abnormal  LFTs.


8. Permanent atrial fib. with controlled ventricular response.


9. Small RUQ nontender abdominal mass.





Recommendations:


1.  Continue current cardiac meds.  Eliquis resumed.


2.  Antibiotics as per ID. Possible viral syndrome. CT L-S spine was negative; 

defer further imaging to PMD/ID for his LBP (which seems chronic)


3.  GI f/u in progress.


4.  Heme f/u in progress.

## 2019-11-09 NOTE — PN
Progress Note, Physician


History of Present Illness: 





Pt noted to have temp of 101.9F rectally at this time. Easily arousable and 

responsive but often confused as per wife at bedside. He states he is at home. 

Has been complaining of back pain intermittently. No obvious distress at this 

time. 





- Current Medication List


Current Medications: 


Active Medications





Acetaminophen (Tylenol -)  650 mg PO Q6H PRN


   PRN Reason: FEVER


   Last Admin: 11/08/19 01:44 Dose:  650 mg


Amlodipine Besylate (Norvasc -)  5 mg PO DAILY [facility]


   Last Admin: 11/09/19 10:55 Dose:  5 mg


Apixaban (Eliquis -)  2.5 mg PO BID [facility]


   Last Admin: 11/09/19 10:56 Dose:  2.5 mg


Cyanocobalamin (Vitamin B12 -)  1,000 mcg PO DAILY [facility]


   Last Admin: 11/09/19 10:55 Dose:  1,000 mcg


Furosemide (Lasix -)  40 mg PO BID@0600,1400 [facility]


Lactulose (Cephulac (Oral Use))  20 gm PO BID [facility]


   Last Admin: 11/09/19 10:55 Dose:  20 gm


Lidocaine (Lidoderm Patch -)  1 patch TP DAILY [facility]


   Last Admin: 11/09/19 10:58 Dose:  1 patch


Melatonin (Melatonin)  3 mg PO HS [facility]


   Last Admin: 11/08/19 21:49 Dose:  3 mg


Methyl Salicylate (Cm-Jarrell -)  1 applic TP BID [facility]


   Last Admin: 11/09/19 10:56 Dose:  1 applic


Metoprolol Succinate (Toprol Xl -)  25 mg PO DAILY [facility]


   Last Admin: 11/09/19 10:56 Dose:  25 mg


Miscellaneous (Lidoderm Patch Removal)  1 each MC DAILY@2200 [facility]


   Last Admin: 11/08/19 21:49 Dose:  1 each


Ondansetron HCl (Zofran Injection)  4 mg IVPUSH Q6H PRN


   PRN Reason: NAUSEA AND/OR VOMITING


   Last Admin: 11/07/19 18:01 Dose:  4 mg


Pantoprazole Sodium (Protonix -)  40 mg PO BID [facility]


   Last Admin: 11/09/19 10:55 Dose:  40 mg


Rosuvastatin Calcium (Crestor -)  5 mg PO HS [facility]


   Last Admin: 11/08/19 21:47 Dose:  5 mg


Tramadol HCl (Ultram -)  50 mg PO Q8H PRN


   PRN Reason: PAIN LEVEL 6-10


   Last Admin: 11/08/19 21:47 Dose:  50 mg


Valsartan (Diovan -)  160 mg PO DAILY [facility]


   Last Admin: 11/09/19 10:55 Dose:  160 mg











- Objective


Vital Signs: 


 Vital Signs











Temperature  98 F   11/09/19 07:00


 


Pulse Rate  80   11/09/19 13:30


 


Respiratory Rate  16   11/09/19 13:30


 


Blood Pressure  146/79   11/09/19 13:30


 


O2 Sat by Pulse Oximetry (%)  95   11/09/19 09:00











Constitutional: Yes: No Distress


Cardiovascular: Yes: Regular Rate and Rhythm


Respiratory: Yes: CTA Bilaterally


Gastrointestinal: Yes: Normal Bowel Sounds, Soft


Genitourinary: Yes: WNL


Musculoskeletal: Yes: Back Pain


Extremities: Yes: WNL


Edema: No


Neurological: Yes: Alert, Confusion


Labs: 


 CBC, BMP





 11/09/19 06:00 





 11/09/19 06:00 





 INR, PTT











INR  1.05  (0.83-1.09)   11/08/19  08:15    


 


Fibrinogen  229.0 mg/dL (238-498)  L  11/08/19  08:15    














- ....Imaging


Cat Scan: Report Reviewed





Problem List





- Problems


(1) Abnormal liver enzymes


Code(s): R74.8 - ABNORMAL LEVELS OF OTHER SERUM ENZYMES   





(2) Afib


Code(s): I48.91 - UNSPECIFIED ATRIAL FIBRILLATION   





(3) Altered mental status


Code(s): R41.82 - ALTERED MENTAL STATUS, UNSPECIFIED   





(4) Leukopenia


Code(s): D72.819 - DECREASED WHITE BLOOD CELL COUNT, UNSPECIFIED   





(5) DIPTI (obstructive sleep apnea)


Code(s): G47.33 - OBSTRUCTIVE SLEEP APNEA (ADULT) (PEDIATRIC)   





(6) Thrombocytopenia


Code(s): D69.6 - THROMBOCYTOPENIA, UNSPECIFIED   





(7) S/P aortic valve replacement


Code(s): Z95.2 - PRESENCE OF PROSTHETIC HEART VALVE   





Assessment/Plan





Fever of unknown etiology


AMS


Back Pain





-- Pt with persistent fevers, cultures/imaging results reviewed, HARRIETT neg. 

Possibly viral etiology


-- At this time will repeat blood cultures while off antibiotics


-- Suggest repeat CT of back


-- Consider LP 


-- leukocytopenia/leukopenia, LFTs improved


Continue monitor closely for now


d/w primary

## 2019-11-09 NOTE — PN
Progress Note (short form)





- Note


Progress Note: 


Appears improved.  NIPPV overnight. 


Tolerating NC. 


Less SOB.  No CP.


No acute events overnight. 





 Intake & Output











 11/06/19 11/07/19 11/08/19 11/09/19





 23:59 23:59 23:59 23:59


 


Intake Total  


 


Output Total 250  600 600


 


Balance  -600


 


Weight 204 lb 2 oz  220 lb 4.8 oz 221 lb 4.8 oz








 Last Vital Signs











Temp Pulse Resp BP Pulse Ox


 


 98 F   74   18   148/89   95 


 


 11/09/19 07:00  11/09/19 07:00  11/09/19 09:00  11/09/19 07:00  11/09/19 09:00








Active Medications





Acetaminophen (Tylenol -)  650 mg PO Q6H PRN


   PRN Reason: FEVER


   Last Admin: 11/08/19 01:44 Dose:  650 mg


Amlodipine Besylate (Norvasc -)  5 mg PO DAILY [facility]


   Last Admin: 11/09/19 10:55 Dose:  5 mg


Apixaban (Eliquis -)  2.5 mg PO BID [facility]


   Last Admin: 11/09/19 10:56 Dose:  2.5 mg


Cyanocobalamin (Vitamin B12 -)  1,000 mcg PO DAILY [facility]


   Last Admin: 11/09/19 10:55 Dose:  1,000 mcg


Furosemide (Lasix Injection -)  40 mg IVPUSH BID@0600,1400 [facility]


   Last Admin: 11/09/19 06:37 Dose:  40 mg


Sodium Chloride (Normal Saline -)  1,000 mls @ 42 mls/hr IV ASDIR [facility]


   Last Admin: 11/09/19 02:00 Dose:  42 mls/hr


Lactulose (Cephulac (Oral Use))  20 gm PO BID [facility]


   Last Admin: 11/09/19 10:55 Dose:  20 gm


Lidocaine (Lidoderm Patch -)  1 patch TP DAILY [facility]


   Last Admin: 11/09/19 10:58 Dose:  1 patch


Melatonin (Melatonin)  3 mg PO HS [facility]


   Last Admin: 11/08/19 21:49 Dose:  3 mg


Methyl Salicylate (Cm-Jarrell -)  1 applic TP BID [facility]


   Last Admin: 11/09/19 10:56 Dose:  1 applic


Metoprolol Succinate (Toprol Xl -)  25 mg PO DAILY [facility]


   Last Admin: 11/09/19 10:56 Dose:  25 mg


Miscellaneous (Lidoderm Patch Removal)  1 each MC DAILY@2200 [facility]


   Last Admin: 11/08/19 21:49 Dose:  1 each


Ondansetron HCl (Zofran Injection)  4 mg IVPUSH Q6H PRN


   PRN Reason: NAUSEA AND/OR VOMITING


   Last Admin: 11/07/19 18:01 Dose:  4 mg


Pantoprazole Sodium (Protonix -)  40 mg PO BID [facility]


   Last Admin: 11/09/19 10:55 Dose:  40 mg


Potassium Chloride (K-Dur -)  20 meq PO ONCE@1400 ONE


   Stop: 11/09/19 14:01


Rosuvastatin Calcium (Crestor -)  5 mg PO HS [facility]


   Last Admin: 11/08/19 21:47 Dose:  5 mg


Tramadol HCl (Ultram -)  50 mg PO Q8H PRN


   PRN Reason: PAIN LEVEL 6-10


   Last Admin: 11/08/19 21:47 Dose:  50 mg


Valsartan (Diovan -)  160 mg PO DAILY [facility]


   Last Admin: 11/09/19 10:55 Dose:  160 mg











Constitutional: Yes: NAD 


Eyes: Yes: WNL


HENT: Yes: WNL


Neck: Yes: WNL


Cardiovascular: Yes: Pulse Irregular, S1, S2


Respiratory: Yes: Diminished


Gastrointestinal: Yes: Normal Bowel Sounds, Soft


Extremities: Yes: WNL


Edema: No


Labs: 


  Laboratory Results - last 24 hr











  11/06/19 11/09/19 11/09/19





  00:50 06:00 06:00


 


WBC   5.3 


 


RBC   4.41 


 


Hgb   13.1 


 


Hct   38.9 


 


MCV   88.1 


 


MCH   29.6 


 


MCHC   33.6 


 


RDW   14.0 


 


Plt Count   123 L D 


 


MPV   8.9 


 


Absolute Neuts (auto)   4.3 


 


Neutrophils %   81.6 


 


Lymphocytes %   6.4 L 


 


Monocytes %   10.7 H 


 


Eosinophils %   1.1 


 


Basophils %   0.2 


 


Nucleated RBC %   0 


 


Fibrin Degrad Products  5 H  


 


Sodium    137


 


Potassium    3.2 L


 


Chloride    101


 


Carbon Dioxide    28


 


Anion Gap    8


 


BUN    32.4 H


 


Creatinine    1.1


 


Est GFR (CKD-EPI)AfAm    72.07


 


Est GFR (CKD-EPI)NonAf    62.19


 


Random Glucose    141 H


 


Calcium    9.4


 


Magnesium    2.1


 


Total Bilirubin    1.8 H


 


Direct Bilirubin   


 


GGT   


 


AST    66 H


 


ALT    117 H


 


Alkaline Phosphatase    452 H


 


Ammonia   


 


C-Reactive Protein   


 


Total Protein    6.1 L


 


Albumin    3.0 L


 


Thyroxine (T4)   














  11/09/19 11/09/19





  06:00 06:00


 


WBC  


 


RBC  


 


Hgb  


 


Hct  


 


MCV  


 


MCH  


 


MCHC  


 


RDW  


 


Plt Count  


 


MPV  


 


Absolute Neuts (auto)  


 


Neutrophils %  


 


Lymphocytes %  


 


Monocytes %  


 


Eosinophils %  


 


Basophils %  


 


Nucleated RBC %  


 


Fibrin Degrad Products  


 


Sodium  


 


Potassium  


 


Chloride  


 


Carbon Dioxide  


 


Anion Gap  


 


BUN  


 


Creatinine  


 


Est GFR (CKD-EPI)AfAm  


 


Est GFR (CKD-EPI)NonAf  


 


Random Glucose  


 


Calcium  


 


Magnesium  


 


Total Bilirubin  1.9 H 


 


Direct Bilirubin  1.3 H 


 


GGT  421 H 


 


AST  65 H 


 


ALT  117 H 


 


Alkaline Phosphatase  456 H 


 


Ammonia   18.50


 


C-Reactive Protein  0.8 H 


 


Total Protein  6.3 L 


 


Albumin  3.1 L 


 


Thyroxine (T4)  10.3 














Problem List





- Problems


(1) Altered mental status


Code(s): R41.82 - ALTERED MENTAL STATUS, UNSPECIFIED   





(2) Abnormal liver enzymes


Code(s): R74.8 - ABNORMAL LEVELS OF OTHER SERUM ENZYMES   





(3) Afib


Code(s): I48.91 - UNSPECIFIED ATRIAL FIBRILLATION   





(4) Leukopenia


Code(s): D72.819 - DECREASED WHITE BLOOD CELL COUNT, UNSPECIFIED   





(5) DIPTI (obstructive sleep apnea)


Code(s): G47.33 - OBSTRUCTIVE SLEEP APNEA (ADULT) (PEDIATRIC)   





(6) Thrombocytopenia


Code(s): D69.6 - THROMBOCYTOPENIA, UNSPECIFIED   





(7) Weakness


Code(s): R53.1 - WEAKNESS   





(8) HLD (hyperlipidemia)


Code(s): E78.5 - HYPERLIPIDEMIA, UNSPECIFIED   





(9) HTN (hypertension)


Code(s): I10 - ESSENTIAL (PRIMARY) HYPERTENSION   





(10) S/P aortic valve replacement


Code(s): Z95.2 - PRESENCE OF PROSTHETIC HEART VALVE   





Assessment/Plan


Fever of unknown origin with altered mental status improved. HARRIETT: No 

Endocarditis 


Toxic Metabolic Encephalopathy improved


Low clinical suspicion of Meningitis 


Sepsis


Leukopenia


AF on chronic AC


AS s/p porcine valve 2009


HTN


CHF


DIPTI


COPD


CKD


ELEVATED LFTS


MEDIASTINAL ADENOPATHY





PLAN:


-Lasix


-BD TX PRN 


-CPAP at night and when sleeping


-Oxygen 


-Rate control


- F/U CHEST CT outpatient 








Dr Becerra

## 2019-11-10 LAB
ALBUMIN SERPL-MCNC: 2.8 G/DL (ref 3.4–5)
ALBUMIN SERPL-MCNC: 2.9 G/DL (ref 3.4–5)
ALP SERPL-CCNC: 392 U/L (ref 45–117)
ALP SERPL-CCNC: 473 U/L (ref 45–117)
ALT SERPL-CCNC: 116 U/L (ref 13–61)
ALT SERPL-CCNC: 93 U/L (ref 13–61)
ANION GAP SERPL CALC-SCNC: 6 MMOL/L (ref 8–16)
ANION GAP SERPL CALC-SCNC: 7 MMOL/L (ref 8–16)
AST SERPL-CCNC: 36 U/L (ref 15–37)
AST SERPL-CCNC: 62 U/L (ref 15–37)
BASOPHILS # BLD: 0.3 % (ref 0–2)
BASOPHILS # BLD: 0.7 % (ref 0–2)
BILIRUB CONJ SERPL-MCNC: 1.2 MG/DL (ref 0–0.2)
BILIRUB SERPL-MCNC: 1.5 MG/DL (ref 0.2–1)
BILIRUB SERPL-MCNC: 2 MG/DL (ref 0.2–1)
BUN SERPL-MCNC: 26 MG/DL (ref 7–18)
BUN SERPL-MCNC: 31.4 MG/DL (ref 7–18)
CALCIUM SERPL-MCNC: 8.9 MG/DL (ref 8.5–10.1)
CALCIUM SERPL-MCNC: 9.3 MG/DL (ref 8.5–10.1)
CHLORIDE SERPL-SCNC: 100 MMOL/L (ref 98–107)
CHLORIDE SERPL-SCNC: 101 MMOL/L (ref 98–107)
CO2 SERPL-SCNC: 28 MMOL/L (ref 21–32)
CO2 SERPL-SCNC: 28 MMOL/L (ref 21–32)
CREAT SERPL-MCNC: 1.1 MG/DL (ref 0.55–1.3)
CREAT SERPL-MCNC: 1.2 MG/DL (ref 0.55–1.3)
DEPRECATED RDW RBC AUTO: 13.8 % (ref 11.9–15.9)
DEPRECATED RDW RBC AUTO: 14.4 % (ref 11.9–15.9)
EOSINOPHIL # BLD: 2 % (ref 0–4.5)
EOSINOPHIL # BLD: 2.5 % (ref 0–4.5)
GLUCOSE SERPL-MCNC: 128 MG/DL (ref 74–106)
GLUCOSE SERPL-MCNC: 140 MG/DL (ref 74–106)
HCT VFR BLD CALC: 38.2 % (ref 35.4–49)
HCT VFR BLD CALC: 39 % (ref 35.4–49)
HGB BLD-MCNC: 12.7 GM/DL (ref 11.7–16.9)
HGB BLD-MCNC: 13.1 GM/DL (ref 11.7–16.9)
LYMPHOCYTES # BLD: 6.7 % (ref 8–40)
LYMPHOCYTES # BLD: 7.3 % (ref 8–40)
MAGNESIUM SERPL-MCNC: 2 MG/DL (ref 1.8–2.4)
MAGNESIUM SERPL-MCNC: 2.1 MG/DL (ref 1.8–2.4)
MCH RBC QN AUTO: 29.3 PG (ref 25.7–33.7)
MCH RBC QN AUTO: 29.4 PG (ref 25.7–33.7)
MCHC RBC AUTO-ENTMCNC: 33.1 G/DL (ref 32–35.9)
MCHC RBC AUTO-ENTMCNC: 33.5 G/DL (ref 32–35.9)
MCV RBC: 88 FL (ref 80–96)
MCV RBC: 88.3 FL (ref 80–96)
MONOCYTES # BLD AUTO: 10.8 % (ref 3.8–10.2)
MONOCYTES # BLD AUTO: 9.1 % (ref 3.8–10.2)
NEUTROPHILS # BLD: 79.8 % (ref 42.8–82.8)
NEUTROPHILS # BLD: 80.8 % (ref 42.8–82.8)
PLATELET # BLD AUTO: 137 K/MM3 (ref 134–434)
PLATELET # BLD AUTO: 153 K/MM3 (ref 134–434)
PMV BLD: 8.1 FL (ref 7.5–11.1)
PMV BLD: 8.5 FL (ref 7.5–11.1)
POTASSIUM SERPLBLD-SCNC: 3.4 MMOL/L (ref 3.5–5.1)
POTASSIUM SERPLBLD-SCNC: 3.5 MMOL/L (ref 3.5–5.1)
PROT SERPL-MCNC: 5.9 G/DL (ref 6.4–8.2)
PROT SERPL-MCNC: 6.3 G/DL (ref 6.4–8.2)
RBC # BLD AUTO: 4.33 M/MM3 (ref 4–5.6)
RBC # BLD AUTO: 4.43 M/MM3 (ref 4–5.6)
SODIUM SERPL-SCNC: 135 MMOL/L (ref 136–145)
SODIUM SERPL-SCNC: 135 MMOL/L (ref 136–145)
WBC # BLD AUTO: 4.6 K/MM3 (ref 4–10)
WBC # BLD AUTO: 5.5 K/MM3 (ref 4–10)

## 2019-11-10 RX ADMIN — FUROSEMIDE SCH MG: 40 TABLET ORAL at 15:00

## 2019-11-10 RX ADMIN — ONDANSETRON PRN MG: 2 INJECTION INTRAMUSCULAR; INTRAVENOUS at 09:50

## 2019-11-10 RX ADMIN — LACTULOSE SCH GM: 20 SOLUTION ORAL at 09:31

## 2019-11-10 RX ADMIN — ANALGESIC BALM SCH APPLIC: 1.74; 4.06 OINTMENT TOPICAL at 09:31

## 2019-11-10 RX ADMIN — ROSUVASTATIN CALCIUM SCH MG: 5 TABLET, FILM COATED ORAL at 21:17

## 2019-11-10 RX ADMIN — ACETAMINOPHEN PRN MG: 10 INJECTION, SOLUTION INTRAVENOUS at 15:44

## 2019-11-10 RX ADMIN — PANTOPRAZOLE SODIUM SCH MG: 40 TABLET, DELAYED RELEASE ORAL at 09:33

## 2019-11-10 RX ADMIN — QUETIAPINE FUMARATE SCH MG: 25 TABLET ORAL at 21:17

## 2019-11-10 RX ADMIN — FUROSEMIDE SCH: 40 TABLET ORAL at 15:00

## 2019-11-10 RX ADMIN — Medication SCH: at 21:18

## 2019-11-10 RX ADMIN — VALSARTAN SCH MG: 160 TABLET, FILM COATED ORAL at 09:32

## 2019-11-10 RX ADMIN — Medication SCH MG: at 21:17

## 2019-11-10 RX ADMIN — PANTOPRAZOLE SODIUM SCH MG: 40 TABLET, DELAYED RELEASE ORAL at 21:17

## 2019-11-10 RX ADMIN — FUROSEMIDE SCH MG: 40 TABLET ORAL at 06:00

## 2019-11-10 RX ADMIN — SODIUM CHLORIDE SCH MLS/HR: 9 INJECTION, SOLUTION INTRAVENOUS at 08:06

## 2019-11-10 RX ADMIN — LIDOCAINE SCH PATCH: 50 PATCH TOPICAL at 09:32

## 2019-11-10 RX ADMIN — AMLODIPINE BESYLATE SCH MG: 5 TABLET ORAL at 09:33

## 2019-11-10 RX ADMIN — LACTULOSE SCH GM: 20 SOLUTION ORAL at 21:17

## 2019-11-10 RX ADMIN — CYANOCOBALAMIN TAB 1000 MCG SCH MCG: 1000 TAB at 09:34

## 2019-11-10 RX ADMIN — ANALGESIC BALM SCH APPLIC: 1.74; 4.06 OINTMENT TOPICAL at 21:18

## 2019-11-10 NOTE — PN
Progress Note (short form)





- Note


Progress Note: 








SUBJECTIVE:





Patient seen and examined 


Course reviewed 


Now he is awake and responding. Doing well





 Last Vital Signs











Temp Pulse Resp BP Pulse Ox


 


 100.6 F H  73   18   159/74   98 


 


 11/10/19 15:16  11/10/19 14:00  11/10/19 14:00  11/10/19 14:16  11/10/19 08:30

















HEENT: FLORENTINO, EOM Intact


Oropharynx: No thrush, No mucositis


Neck: Supple


Nodes: Without adenopathy


Cor: irregular rhythm 


Lungs: Clear to P&A


Abd: Soft, Normal bowel sounds, No organomegaly, abdominal mass left upper 

quadrant


testes descende, circumcised  


Ext:No significant edema


Skin: No rashes, Integument intact


  


 





 11/10/19 06:05 





 11/10/19 06:05 











 Current Medications











Generic Name Dose Route Start Last Admin





  Trade Name Freq  PRN Reason Stop Dose Admin


 


Acetaminophen  750 mg  11/09/19 17:07  11/10/19 15:44





  Ofirmev Injection -  IVPB   750 mg





  Q12H PRN   Administration





  FEVER   





     





     





     


 


Amlodipine Besylate  5 mg  11/05/19 10:00  11/10/19 09:33





  Norvasc -  PO   5 mg





  DAILY LARISA   Administration





     





     





     





     


 


Cyanocobalamin  1,000 mcg  11/05/19 10:00  11/10/19 09:34





  Vitamin B12 -  PO   1,000 mcg





  DAILY LARISA   Administration





     





     





     





     


 


Furosemide  40 mg  11/10/19 06:00  11/10/19 15:00





  Lasix -  PO   Not Given





  BID@0600,1400 LARISA   





     





     





     





     


 


Sodium Chloride  1,000 mls @ 125 mls/hr  11/10/19 07:06  11/10/19 08:06





  Normal Saline -  IV   125 mls/hr





  ASDIR LARISA   Administration





     





     





     





     


 


Lactulose  20 gm  11/08/19 22:00  11/10/19 09:31





  Cephulac (Oral Use)  PO   20 gm





  BID LARISA   Administration





     





     





     





     


 


Lidocaine  1 patch  11/07/19 14:00  11/10/19 09:32





  Lidoderm Patch -  TP   1 patch





  DAILY LARISA   Administration





     





     





     





     


 


Melatonin  3 mg  11/07/19 22:00  11/09/19 22:21





  Melatonin  PO   3 mg





  HS LARISA   Administration





     





     





     





     


 


Methyl Salicylate  1 applic  11/07/19 10:00  11/10/19 09:31





  Cm-Jarrell -  TP   1 applic





  BID LARISA   Administration





     





     





     





     


 


Metoprolol Succinate  25 mg  11/05/19 10:00  11/10/19 09:34





  Toprol Xl -  PO   25 mg





  DAILY LARISA   Administration





     





     





     





     


 


Miscellaneous  1 each  11/07/19 22:00  11/09/19 22:31





  Lidoderm Patch Removal  MC   Not Given





  DAILY@2200 LARISA   





     





     





     





     


 


Ondansetron HCl  4 mg  11/07/19 17:27  11/10/19 09:50





  Zofran Injection  IVPUSH   4 mg





  Q6H PRN   Administration





  NAUSEA AND/OR VOMITING   





     





     





     


 


Pantoprazole Sodium  40 mg  11/05/19 22:00  11/10/19 09:33





  Protonix -  PO   40 mg





  BID LARISA   Administration





     





     





     





     


 


Polyethylene Glycol  17 gm  11/11/19 10:00  





  Miralax (For Daily Use) -  PO   





  DAILY LARISA   





     





     





     





     


 


Quetiapine Fumarate  12.5 mg  11/09/19 20:00  11/09/19 22:21





  Seroquel -  PO   12.5 mg





  DAILY@2000 LARISA   Administration





     





     





     





     


 


Rosuvastatin Calcium  5 mg  11/04/19 22:00  11/09/19 22:21





  Crestor -  PO   5 mg





  HS LARISA   Administration





     





     





     





     


 


Tramadol HCl  50 mg  11/08/19 09:48  11/10/19 01:43





  Ultram -  PO   50 mg





  Q8H PRN   Administration





  PAIN LEVEL 6-10   





     





     





     


 


Valsartan  160 mg  11/05/19 10:00  11/10/19 09:32





  Diovan -  PO   160 mg





  DAILY LARISA   Administration





     





     





     





     














Impression:





Picture suggestive of infectious process. Spiking. Agree with CT CAP


Neutropenia/ thombocytopenia resolved (slight TCP though)


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

) syndrome, now cytopenias resolved and LFTs are mostly stable/normalizing but 

TBili slightly up. Close monitoring and if no resolution consider liver biopsy.


Pt has mediastinal LAD. Consider IR consult for core needle biopsy to r/o 

lymphoma


Given correction of Cytopenias will defer bone marrow biopsy at this time.








Problem List





- Problems


(1) Leukopenia


Code(s): D72.819 - DECREASED WHITE BLOOD CELL COUNT, UNSPECIFIED   





(2) Severe sepsis


Code(s): A41.9 - SEPSIS, UNSPECIFIED ORGANISM; R65.20 - SEVERE SEPSIS WITHOUT 

SEPTIC SHOCK   





(3) Thrombocytopenia


Code(s): D69.6 - THROMBOCYTOPENIA, UNSPECIFIED

## 2019-11-10 NOTE — PN
Progress Note, Physician


Chief Complaint: 





Naresh T to 101.9. CT A/P & chest done. Defervesed with tylenol More lethargic 

today. Pending MRI lumbar spine


History of Present Illness: 





Patient is an 84 year old male with a significant past medical history of 

hypertension, hyperlipidemia, prosthetic aortic valve replacement (bovine). 

afib (on eliquis).   He presented to the [facility] ED with ataxia, chills, rigors and 

weakness.  patient had rigors at home and brought into the ED by his family for 

further evaluation. 








- Current Medication List


Current Medications: 


Active Medications





Acetaminophen (Ofirmev Injection -)  750 mg IVPB Q12H PRN


   PRN Reason: FEVER


   Last Admin: 11/09/19 17:16 Dose:  750 mg


Amlodipine Besylate (Norvasc -)  5 mg PO DAILY [facility]


   Last Admin: 11/09/19 10:55 Dose:  5 mg


Cyanocobalamin (Vitamin B12 -)  1,000 mcg PO DAILY [facility]


   Last Admin: 11/09/19 10:55 Dose:  1,000 mcg


Furosemide (Lasix -)  40 mg PO BID@0600,1400 [facility]


   Last Admin: 11/10/19 06:00 Dose:  40 mg


Sodium Chloride (Normal Saline -)  1,000 mls @ 125 mls/hr IV ASDIR [facility]


Lactulose (Cephulac (Oral Use))  20 gm PO BID [facility]


   Last Admin: 11/09/19 22:21 Dose:  20 gm


Lidocaine (Lidoderm Patch -)  1 patch TP DAILY [facility]


   Last Admin: 11/09/19 10:58 Dose:  1 patch


Melatonin (Melatonin)  3 mg PO HS [facility]


   Last Admin: 11/09/19 22:21 Dose:  3 mg


Methyl Salicylate (Cm-Jarrell -)  1 applic TP BID [facility]


   Last Admin: 11/09/19 22:22 Dose:  1 applic


Metoprolol Succinate (Toprol Xl -)  25 mg PO DAILY [facility]


   Last Admin: 11/09/19 10:56 Dose:  25 mg


Miscellaneous (Lidoderm Patch Removal)  1 each MC DAILY@2200 [facility]


   Last Admin: 11/09/19 22:31 Dose:  Not Given


Ondansetron HCl (Zofran Injection)  4 mg IVPUSH Q6H PRN


   PRN Reason: NAUSEA AND/OR VOMITING


   Last Admin: 11/07/19 18:01 Dose:  4 mg


Pantoprazole Sodium (Protonix -)  40 mg PO BID [facility]


   Last Admin: 11/09/19 22:21 Dose:  40 mg


Quetiapine Fumarate (Seroquel -)  12.5 mg PO DAILY@2000 [facility]


   Last Admin: 11/09/19 22:21 Dose:  12.5 mg


Rosuvastatin Calcium (Crestor -)  5 mg PO HS [facility]


   Last Admin: 11/09/19 22:21 Dose:  5 mg


Tramadol HCl (Ultram -)  50 mg PO Q8H PRN


   PRN Reason: PAIN LEVEL 6-10


   Last Admin: 11/10/19 01:43 Dose:  50 mg


Valsartan (Diovan -)  160 mg PO DAILY [facility]


   Last Admin: 11/09/19 10:55 Dose:  160 mg











- Objective


Vital Signs: 


 Vital Signs











Temperature  98.2 F   11/10/19 02:00


 


Pulse Rate  85   11/10/19 02:00


 


Respiratory Rate  18   11/10/19 02:00


 


Blood Pressure  160/81   11/10/19 02:00


 


O2 Sat by Pulse Oximetry (%)  95   11/09/19 21:00











Additional Findings/Remarks: 





Constitutional: Yes: Well Nourished, No Distress, Calm


Eyes: Yes: WNL, Conjunctiva Clear


HENT: Yes: WNL, Atraumatic, Normocephalic


Neck: Yes: WNL, Supple, Trachea Midline


Cardiovascular: Yes: WNL, Regular Rate and Rhythm


Respiratory: Yes: Diminished at bases


Gastrointestinal: Yes: WNL, Normal Bowel Sounds, Soft, Abdomen, Obese. No 

palpable masses noted


...Rectal Exam: Yes: Deferred


Genitourinary: Yes: WNL


Breast(s): Yes: WNL


Musculoskeletal: Yes: WNL. Pain to lower lumbar area. No paresthesia or weakness


Extremities: Yes: WNL


Edema: No


Peripheral Pulses WNL: Yes


Peripheral Pulses: Left Radial: 2+, Right Radial: 2+, Left Doralis Pedis: 2+, 

Right Dorsalis Pedis: 2+, Left Femoral: 2+, Right Femoral: 2+


Neurological: Yes:no confusion


...Motor Strength: WNL (generalized weakness)


Psychiatric: Yes: WNL


Labs: 


 CBC, BMP





 11/10/19 06:05 





 INR, PTT











INR  1.05  (0.83-1.09)   11/08/19  08:15    


 


Fibrinogen  229.0 mg/dL (238-498)  L  11/08/19  08:15    














- ....Imaging


Cat Scan: Other (CT A/P: irregukar sclerosis L3-4. Questionable degenerative 

changes v osteo. Thickeneing of gallbladder with debridement, questionable 

cyctitis. Right rectus sheath heamatome 4x3x6.)


MRI: Pending





Problem List





- Problems


(1) Abnormal liver enzymes


Assessment/Plan: 


LFTs continue to trend down, AST/ALT  62/116


NH3 18.5


US with hepatomegaly-fatty liver vs Hepatocellular 


US without mass 


avoid hepatotoxic agents


MRCP revealing no CBD stones, GB stone but no cholecystitis. No need for ERCP 

at this time


CT A/P :Thickeneing of gallbladder with debridement, questionable cyctitis. 

Right rectus sheath heamatome 4x3x6-may be the palpable lesion that was felt 

earlier


will determine if liver biopsy will be done on Tues (off eliquis for 72hrs)





Code(s): R74.8 - ABNORMAL LEVELS OF OTHER SERUM ENZYMES   





(2) Afib


Assessment/Plan: 


Rate controlled


c/w toprol


Eliquis held for possible invasive testing-last dose 11/10 @ 11am


c/w tele monitoring


Code(s): I48.91 - UNSPECIFIED ATRIAL FIBRILLATION   





(3) Altered mental status


Assessment/Plan: 


mild confusion today but redirectable


seroquel strted lasr night at 12.5  mg


confusion is multifactoriol-infectious, prolonged hospital stay, fevers


fall precautions





Code(s): R41.82 - ALTERED MENTAL STATUS, UNSPECIFIED   





(4) Leukopenia


Assessment/Plan: 


WBC 4.6 today


neutropenia of unclear etiology


viral panel negative, rpr negative, blood smear negative


All Cx are NGTD but still having low grade daily fevers. T max 101.9 at 1700


pan cultured yesterday


MRI lumbar spine pending


daily cbc 


heme consultation appreciated





Code(s): D72.819 - DECREASED WHITE BLOOD CELL COUNT, UNSPECIFIED   





(5) DIPTI (obstructive sleep apnea)


Assessment/Plan: 


c/w CPAP prn and at night


F/U CHEST CT outpatient 


Code(s): G47.33 - OBSTRUCTIVE SLEEP APNEA (ADULT) (PEDIATRIC)   





(6) Prophylactic measure


Assessment/Plan: 


FEN


cardiac diet


restarted IVF yesterday with fever and MRI contrast load 


monitor electrolytes








DVT


eliquis back on hold





Dispo


maintain as in patient


full code


discharge planning- family requested outpt PT to continue on discharge


Code(s): Z29.9 - ENCOUNTER FOR PROPHYLACTIC MEASURES, UNSPECIFIED   





(7) Severe sepsis


Assessment/Plan: 


presented with severe sepsis with neutropenia, fevers, malaise, and fatigue/

weakness.  


Fever of unknown origin, continuing daily spikes, off abx  Continue to monitor 

off


tick borne disease panel /viral negative


leptospira pending 


chest ct shows mediastinal adenopaty with moderate increase since prior exam- 

offical read on CT cehst pending


CT with gall stones, no acute lb seen, spine CT does not explain source of 

fever


HARRIETT with no vegetations


Code(s): A41.9 - SEPSIS, UNSPECIFIED ORGANISM; R65.20 - SEVERE SEPSIS WITHOUT 

SEPTIC SHOCK   





(8) Thrombocytopenia


Assessment/Plan: 


plt ct 137


no signs of bleeding, will monitor


ESR/CRP .8 /SANTOS/RF pending


eliquis back on hold


appreciate hematology consultation





Code(s): D69.6 - THROMBOCYTOPENIA, UNSPECIFIED   





(9) Weakness


Assessment/Plan: 


improving with PT, ambulating the hendricks with assistance of walker


fall precautions


family/pt would like to continue outpatient PT on discharge


Code(s): R53.1 - WEAKNESS   





Visit type





- Emergency Visit


Emergency Visit: Yes


ED Registration Date: 11/01/19


Care time: The patient presented to the Emergency Department on the above date 

and was hospitalized for further evaluation of their emergent condition.





- New Patient


This patient is new to me today: No





- Critical Care


Critical Care patient: No





- Discharge Referral


Referred to [facility] No

## 2019-11-10 NOTE — PN
Progress Note (short form)





- Note


Progress Note: 


Appears improved.  NIPPV overnight. 


Tolerating NC. 


Less SOB.  No CP.


No acute events overnight. 





 Intake & Output











 11/07/19 11/08/19 11/09/19 11/10/19





 23:59 23:59 23:59 23:59


 


Intake Total 904 2480 500 700


 


Output Total  600 600 


 


Balance 904 1880 -100 700


 


Weight  220 lb 4.8 oz 221 lb 4.8 oz 220 lb 12.8 oz











 Last Vital Signs











Temp Pulse Resp BP Pulse Ox


 


 98.4 F   81   17   159/79   98 


 


 11/10/19 06:00  11/10/19 09:46  11/10/19 09:46  11/10/19 09:46  11/10/19 08:30








Active Medications





Acetaminophen (Ofirmev Injection -)  750 mg IVPB Q12H PRN


   PRN Reason: FEVER


   Last Admin: 11/09/19 17:16 Dose:  750 mg


Amlodipine Besylate (Norvasc -)  5 mg PO DAILY [facility]


   Last Admin: 11/10/19 09:33 Dose:  5 mg


Cyanocobalamin (Vitamin B12 -)  1,000 mcg PO DAILY [facility]


   Last Admin: 11/10/19 09:34 Dose:  1,000 mcg


Furosemide (Lasix -)  40 mg PO BID@0600,1400 [facility]


   Last Admin: 11/10/19 06:00 Dose:  40 mg


Sodium Chloride (Normal Saline -)  1,000 mls @ 125 mls/hr IV ASDIR [facility]


   Last Admin: 11/10/19 08:06 Dose:  125 mls/hr


Lactulose (Cephulac (Oral Use))  20 gm PO BID [facility]


   Last Admin: 11/10/19 09:31 Dose:  20 gm


Lidocaine (Lidoderm Patch -)  1 patch TP DAILY [facility]


   Last Admin: 11/10/19 09:32 Dose:  1 patch


Melatonin (Melatonin)  3 mg PO HS [facility]


   Last Admin: 11/09/19 22:21 Dose:  3 mg


Methyl Salicylate (Cm-Jarrell -)  1 applic TP BID [facility]


   Last Admin: 11/10/19 09:31 Dose:  1 applic


Metoprolol Succinate (Toprol Xl -)  25 mg PO DAILY [facility]


   Last Admin: 11/10/19 09:34 Dose:  25 mg


Miscellaneous (Lidoderm Patch Removal)  1 each MC DAILY@2200 [facility]


   Last Admin: 11/09/19 22:31 Dose:  Not Given


Ondansetron HCl (Zofran Injection)  4 mg IVPUSH Q6H PRN


   PRN Reason: NAUSEA AND/OR VOMITING


   Last Admin: 11/10/19 09:50 Dose:  4 mg


Pantoprazole Sodium (Protonix -)  40 mg PO BID [facility]


   Last Admin: 11/10/19 09:33 Dose:  40 mg


Quetiapine Fumarate (Seroquel -)  12.5 mg PO DAILY@2000 [facility]


   Last Admin: 11/09/19 22:21 Dose:  12.5 mg


Rosuvastatin Calcium (Crestor -)  5 mg PO HS [facility]


   Last Admin: 11/09/19 22:21 Dose:  5 mg


Tramadol HCl (Ultram -)  50 mg PO Q8H PRN


   PRN Reason: PAIN LEVEL 6-10


   Last Admin: 11/10/19 01:43 Dose:  50 mg


Valsartan (Diovan -)  160 mg PO DAILY [facility]


   Last Admin: 11/10/19 09:32 Dose:  160 mg








Constitutional: Yes: NAD 


Eyes: Yes: WNL


HENT: Yes: WNL


Neck: Yes: WNL


Cardiovascular: Yes: Pulse Irregular, S1, S2


Respiratory: Yes: Diminished


Gastrointestinal: Yes: Normal Bowel Sounds, Soft


Extremities: Yes: WNL


Edema: No


Labs: 


  


 Laboratory Results - last 24 hr











  11/09/19 11/09/19 11/10/19





  06:00 21:30 06:05


 


WBC    4.6


 


RBC    4.43


 


Hgb    13.1


 


Hct    39.0


 


MCV    88.0


 


MCH    29.4


 


MCHC    33.5


 


RDW    13.8


 


Plt Count    137


 


MPV    8.5


 


Absolute Neuts (auto)    3.8


 


Neutrophils %    80.8


 


Lymphocytes %    7.3 L


 


Monocytes %    9.1


 


Eosinophils %    2.5  D


 


Basophils %    0.3


 


Nucleated RBC %    0


 


Sodium   


 


Potassium   


 


Chloride   


 


Carbon Dioxide   


 


Anion Gap   


 


BUN   


 


Creatinine   


 


Est GFR (CKD-EPI)AfAm   


 


Est GFR (CKD-EPI)NonAf   


 


Random Glucose   


 


Calcium   


 


Magnesium   


 


Total Bilirubin   


 


Direct Bilirubin   


 


AST   


 


ALT   


 


Alkaline Phosphatase   


 


Total Protein   


 


Albumin   


 


Tumor Marker AFP  1.9  


 


Urine Color   Dk yellow 


 


Urine Appearance   Clear 


 


Urine pH   5.0 


 


Ur Specific Gravity   1.021 


 


Urine Protein   1+ H 


 


Urine Glucose (UA)   Negative 


 


Urine Ketones   Negative 


 


Urine Blood   Negative 


 


Urine Nitrite   Negative 


 


Urine Bilirubin   Negative 


 


Urine Urobilinogen   1.0 


 


Ur Leukocyte Esterase   Negative 


 


Urine WBC (Auto)   1 


 


Urine RBC (Auto)   2 


 


Urine Casts (Auto)   10 


 


U Epithel Cells (Auto)   1.4 


 


Urine Bacteria (Auto)   0.3 














  11/10/19





  06:05


 


WBC 


 


RBC 


 


Hgb 


 


Hct 


 


MCV 


 


MCH 


 


MCHC 


 


RDW 


 


Plt Count 


 


MPV 


 


Absolute Neuts (auto) 


 


Neutrophils % 


 


Lymphocytes % 


 


Monocytes % 


 


Eosinophils % 


 


Basophils % 


 


Nucleated RBC % 


 


Sodium  135 L


 


Potassium  3.4 L


 


Chloride  100


 


Carbon Dioxide  28


 


Anion Gap  7 L


 


BUN  31.4 H


 


Creatinine  1.2


 


Est GFR (CKD-EPI)AfAm  64.88


 


Est GFR (CKD-EPI)NonAf  55.98


 


Random Glucose  128 H


 


Calcium  9.3


 


Magnesium  2.0


 


Total Bilirubin  2.0 H


 


Direct Bilirubin  1.2 H


 


AST  62 H


 


ALT  116 H


 


Alkaline Phosphatase  473 H


 


Total Protein  6.3 L


 


Albumin  2.9 L


 


Tumor Marker AFP 


 


Urine Color 


 


Urine Appearance 


 


Urine pH 


 


Ur Specific Gravity 


 


Urine Protein 


 


Urine Glucose (UA) 


 


Urine Ketones 


 


Urine Blood 


 


Urine Nitrite 


 


Urine Bilirubin 


 


Urine Urobilinogen 


 


Ur Leukocyte Esterase 


 


Urine WBC (Auto) 


 


Urine RBC (Auto) 


 


Urine Casts (Auto) 


 


U Epithel Cells (Auto) 


 


Urine Bacteria (Auto) 

















Problem List





- Problems


(1) Altered mental status


Code(s): R41.82 - ALTERED MENTAL STATUS, UNSPECIFIED   





(2) Abnormal liver enzymes


Code(s): R74.8 - ABNORMAL LEVELS OF OTHER SERUM ENZYMES   





(3) Afib


Code(s): I48.91 - UNSPECIFIED ATRIAL FIBRILLATION   





(4) Leukopenia


Code(s): D72.819 - DECREASED WHITE BLOOD CELL COUNT, UNSPECIFIED   





(5) DIPTI (obstructive sleep apnea)


Code(s): G47.33 - OBSTRUCTIVE SLEEP APNEA (ADULT) (PEDIATRIC)   





(6) Thrombocytopenia


Code(s): D69.6 - THROMBOCYTOPENIA, UNSPECIFIED   





(7) Weakness


Code(s): R53.1 - WEAKNESS   





(8) HLD (hyperlipidemia)


Code(s): E78.5 - HYPERLIPIDEMIA, UNSPECIFIED   





(9) HTN (hypertension)


Code(s): I10 - ESSENTIAL (PRIMARY) HYPERTENSION   





(10) S/P aortic valve replacement


Code(s): Z95.2 - PRESENCE OF PROSTHETIC HEART VALVE   





Assessment/Plan


Fever of unknown origin with altered mental status improved. HARRIETT: No 

Endocarditis 


Toxic Metabolic Encephalopathy improved


Low clinical suspicion of Meningitis 


Sepsis


Leukopenia


AF on chronic AC


AS s/p porcine valve 2009


HTN


CHF


DIPTI


COPD


CKD


ELEVATED LFTS


MEDIASTINAL ADENOPATHY





PLAN:


-Lasix


-BD TX PRN 


-CPAP at night and when sleeping


-Oxygen 


-Rate control


- F/U CHEST CT outpatient 





Dr Becerra

## 2019-11-10 NOTE — PN
Progress Note, Physician


History of Present Illness: 





Pt afebrile since last evening. Still appears somewhat restless but alert and 

responsive. No acute distress noted. CT results reviewed. 





- Current Medication List


Current Medications: 


Active Medications





Acetaminophen (Ofirmev Injection -)  750 mg IVPB Q12H PRN


   PRN Reason: FEVER


   Last Admin: 11/09/19 17:16 Dose:  750 mg


Amlodipine Besylate (Norvasc -)  5 mg PO DAILY [facility]


   Last Admin: 11/10/19 09:33 Dose:  5 mg


Cyanocobalamin (Vitamin B12 -)  1,000 mcg PO DAILY [facility]


   Last Admin: 11/10/19 09:34 Dose:  1,000 mcg


Furosemide (Lasix -)  40 mg PO BID@0600,1400 [facility]


   Last Admin: 11/10/19 06:00 Dose:  40 mg


Sodium Chloride (Normal Saline -)  1,000 mls @ 125 mls/hr IV ASDIR [facility]


   Last Admin: 11/10/19 08:06 Dose:  125 mls/hr


Lactulose (Cephulac (Oral Use))  20 gm PO BID [facility]


   Last Admin: 11/10/19 09:31 Dose:  20 gm


Lidocaine (Lidoderm Patch -)  1 patch TP DAILY [facility]


   Last Admin: 11/10/19 09:32 Dose:  1 patch


Melatonin (Melatonin)  3 mg PO HS [facility]


   Last Admin: 11/09/19 22:21 Dose:  3 mg


Methyl Salicylate (Cm-Jarrell -)  1 applic TP BID [facility]


   Last Admin: 11/10/19 09:31 Dose:  1 applic


Metoprolol Succinate (Toprol Xl -)  25 mg PO DAILY [facility]


   Last Admin: 11/10/19 09:34 Dose:  25 mg


Miscellaneous (Lidoderm Patch Removal)  1 each MC DAILY@2200 [facility]


   Last Admin: 11/09/19 22:31 Dose:  Not Given


Ondansetron HCl (Zofran Injection)  4 mg IVPUSH Q6H PRN


   PRN Reason: NAUSEA AND/OR VOMITING


   Last Admin: 11/10/19 09:50 Dose:  4 mg


Pantoprazole Sodium (Protonix -)  40 mg PO BID [facility]


   Last Admin: 11/10/19 09:33 Dose:  40 mg


Quetiapine Fumarate (Seroquel -)  12.5 mg PO DAILY@2000 [facility]


   Last Admin: 11/09/19 22:21 Dose:  12.5 mg


Rosuvastatin Calcium (Crestor -)  5 mg PO HS [facility]


   Last Admin: 11/09/19 22:21 Dose:  5 mg


Tramadol HCl (Ultram -)  50 mg PO Q8H PRN


   PRN Reason: PAIN LEVEL 6-10


   Last Admin: 11/10/19 01:43 Dose:  50 mg


Valsartan (Diovan -)  160 mg PO DAILY [facility]


   Last Admin: 11/10/19 09:32 Dose:  160 mg











- Objective


Vital Signs: 


 Vital Signs











Temperature  97.9 F   11/10/19 09:46


 


Pulse Rate  77   11/10/19 13:35


 


Respiratory Rate  16   11/10/19 13:35


 


Blood Pressure  179/88 H  11/10/19 13:35


 


O2 Sat by Pulse Oximetry (%)  98   11/10/19 08:30











Constitutional: Yes: No Distress


Eyes: Yes: Conjunctiva Clear


Cardiovascular: Yes: Pulse Irregular


Respiratory: Yes: Regular


Gastrointestinal: Yes: Normal Bowel Sounds, Soft


Genitourinary: Yes: WNL


Extremities: Yes: WNL


Integumentary: Yes: WNL


Neurological: Yes: Alert


Labs: 


 CBC, BMP





 11/10/19 06:05 





 11/10/19 06:05 





 INR, PTT











INR  1.05  (0.83-1.09)   11/08/19  08:15    


 


Fibrinogen  229.0 mg/dL (238-498)  L  11/08/19  08:15    








 Microbiology





11/04/19 11:30   Blood - Peripheral Venous   Blood Culture - Final


                            NO GROWTH AFTER 5 DAYS INCUBATION


11/04/19 11:43   Blood - Peripheral Venous   Blood Culture - Final


                            NO GROWTH AFTER 5 DAYS INCUBATION


11/06/19 14:20   Urine - Urine Clean Catch   Urine Culture - Final


                            NO GROWTH OBTAINED


11/01/19 19:45   Blood - Peripheral Venous   Blood Culture - Final


                            NO GROWTH AFTER 5 DAYS INCUBATION


11/01/19 19:45   Blood - Peripheral Venous   Blood Culture - Final


                            NO GROWTH AFTER 5 DAYS INCUBATION


10/31/19 20:00   Blood - Peripheral Venous   Blood Culture - Final


                            NO GROWTH AFTER 5 DAYS INCUBATION


10/31/19 19:55   Blood - Peripheral Venous   Blood Culture - Final


                            NO GROWTH AFTER 5 DAYS INCUBATION


11/03/19 12:23   Blood - Peripheral Venous   Blood Parasites Smear - Final


11/01/19 19:40   Urine - Urine Clean Catch   Urine Culture - Final


                            NO GROWTH OBTAINED


11/01/19 11:20   Nares - Mrsa Screen - Right   MRSA Screen - Final


                            NO MRSA ISOLATED


11/01/19 11:20   Nares - Mrsa Screen - Left   MRSA Screen - Final


                            NO MRSA ISOLATED


11/02/19 06:50   Urine - Urine Clean Catch   Legionella Antigen - Final


11/02/19 06:50   Urine - Urine Clean Catch   Streptococcus pneumoniae Antigen (

M - Final


10/31/19 21:25   Urine - Urine Clean Catch   Urine Culture - Final


                            NO GROWTH OBTAINED





 Abnormal Lab Results











  11/09/19 11/10/19 11/10/19





  21:30 06:05 06:05


 


Lymphocytes %   7.3 L 


 


Sodium    135 L


 


Potassium    3.4 L


 


Anion Gap    7 L


 


BUN    31.4 H


 


Random Glucose    128 H


 


Total Bilirubin    2.0 H


 


Direct Bilirubin    1.2 H


 


AST    62 H


 


ALT    116 H


 


Alkaline Phosphatase    473 H


 


Total Protein    6.3 L


 


Albumin    2.9 L


 


Urine Protein  1+ H  














- ....Imaging


Cat Scan: Report Reviewed





Problem List





- Problems


(1) Abnormal liver enzymes


Code(s): R74.8 - ABNORMAL LEVELS OF OTHER SERUM ENZYMES   





(2) Afib


Code(s): I48.91 - UNSPECIFIED ATRIAL FIBRILLATION   





(3) Altered mental status


Code(s): R41.82 - ALTERED MENTAL STATUS, UNSPECIFIED   





(4) Leukopenia


Code(s): D72.819 - DECREASED WHITE BLOOD CELL COUNT, UNSPECIFIED   





(5) DIPTI (obstructive sleep apnea)


Code(s): G47.33 - OBSTRUCTIVE SLEEP APNEA (ADULT) (PEDIATRIC)   





(6) Thrombocytopenia


Code(s): D69.6 - THROMBOCYTOPENIA, UNSPECIFIED   





(7) S/P aortic valve replacement


Code(s): Z95.2 - PRESENCE OF PROSTHETIC HEART VALVE   





Assessment/Plan





Fever of unknown etiology


AMS


Back Pain


Mediastinal KITTY





-- Pt with recurrent fevers. Afebrile since last night. 


-- follow up repeat cultures sent, awaiting MRI spine results


-- CT Chest with mediastinal lymphadenopathy


-- CT A/P with rectus sheath hematoma -- monitor closely 


-- leukopenia/thrombocytopenia resolved. LFTs improved,still mildly elevated


-- Hematology following


-- Continue monitor off antibiotics at this time

## 2019-11-11 LAB
ALBUMIN SERPL-MCNC: 3 G/DL (ref 3.4–5)
ALP SERPL-CCNC: 389 U/L (ref 45–117)
ALT SERPL-CCNC: 84 U/L (ref 13–61)
APPEARANCE CSF: CLEAR
AST SERPL-CCNC: 30 U/L (ref 15–37)
BILIRUB CONJ SERPL-MCNC: 1.1 MG/DL (ref 0–0.2)
BILIRUB SERPL-MCNC: 2.4 MG/DL (ref 0.2–1)
CMV IGM TITR SERPL: < 30 AU/ML (ref 0–29.9)
COLOR CSF: COLORLESS
GLUCOSE CSF-MCNC: 67 MG/DL (ref 40–70)
PROT SERPL-MCNC: 6.5 G/DL (ref 6.4–8.2)
WBC # CSF: 2 /UL

## 2019-11-11 PROCEDURE — 009U3ZZ DRAINAGE OF SPINAL CANAL, PERCUTANEOUS APPROACH: ICD-10-PCS

## 2019-11-11 RX ADMIN — ANALGESIC BALM SCH APPLIC: 1.74; 4.06 OINTMENT TOPICAL at 21:35

## 2019-11-11 RX ADMIN — SODIUM CHLORIDE SCH: 9 INJECTION, SOLUTION INTRAVENOUS at 06:18

## 2019-11-11 RX ADMIN — ROSUVASTATIN CALCIUM SCH MG: 5 TABLET, FILM COATED ORAL at 21:36

## 2019-11-11 RX ADMIN — PANTOPRAZOLE SODIUM SCH MG: 40 TABLET, DELAYED RELEASE ORAL at 09:02

## 2019-11-11 RX ADMIN — Medication SCH EACH: at 21:36

## 2019-11-11 RX ADMIN — CYANOCOBALAMIN TAB 1000 MCG SCH MCG: 1000 TAB at 09:02

## 2019-11-11 RX ADMIN — QUETIAPINE FUMARATE SCH MG: 25 TABLET ORAL at 21:36

## 2019-11-11 RX ADMIN — POLYETHYLENE GLYCOL 3350 SCH GRAMS: 17 POWDER, FOR SOLUTION ORAL at 18:32

## 2019-11-11 RX ADMIN — ANALGESIC BALM SCH APPLIC: 1.74; 4.06 OINTMENT TOPICAL at 09:08

## 2019-11-11 RX ADMIN — Medication SCH MG: at 21:35

## 2019-11-11 RX ADMIN — LACTULOSE SCH GM: 20 SOLUTION ORAL at 09:02

## 2019-11-11 RX ADMIN — PANTOPRAZOLE SODIUM SCH MG: 40 TABLET, DELAYED RELEASE ORAL at 21:35

## 2019-11-11 RX ADMIN — FUROSEMIDE SCH MG: 40 TABLET ORAL at 15:18

## 2019-11-11 RX ADMIN — LIDOCAINE SCH PATCH: 50 PATCH TOPICAL at 09:02

## 2019-11-11 RX ADMIN — AMLODIPINE BESYLATE SCH MG: 5 TABLET ORAL at 09:02

## 2019-11-11 RX ADMIN — FUROSEMIDE SCH MG: 40 TABLET ORAL at 06:15

## 2019-11-11 RX ADMIN — VALSARTAN SCH MG: 160 TABLET, FILM COATED ORAL at 09:02

## 2019-11-11 NOTE — PN
Progress Note (short form)





- Note


Progress Note: 





Thoracic Surgery Attending:





Fever and weight loss without obvious malignancy or infection on imaging. Does 

have pathologic lymph nodes but don't fit lymphoma picture. They could fit 

esophageal cancer model but fevers and hepatitis aren't consistent with this. 

Ultimately needs PET scan. Ideally biopsies should be done with FNA (EBUS for 

lymph nodes) or could consider EGD  but up to Dr. Miramontes's opinion. Would 

defer mediastinoscopy for now bc I think too invasive for its probability of 

making diagnosis.

## 2019-11-11 NOTE — PN
Progress Note (short form)





- Note


Progress Note: 


Appears improved. CPAP overnight. 


Tolerating NC. 


Less SOB.  No CP.


Still with fever. 





 Intake & Output











 11/08/19 11/09/19 11/10/19 11/11/19





 23:59 23:59 23:59 23:59


 


Intake Total 2480 500 1560 120


 


Output Total 600 600 200 


 


Balance 1880 -100 1360 120


 


Weight 220 lb 4.8 oz 221 lb 4.8 oz 220 lb 12.8 oz 208 lb 9.6 oz








 Last Vital Signs











Temp Pulse Resp BP Pulse Ox


 


 98.6 F   77   20   173/92 H  96 


 


 11/11/19 09:00  11/11/19 09:00  11/11/19 09:00  11/11/19 09:00  11/10/19 20:51








Active Medications





Acetaminophen (Ofirmev Injection -)  750 mg IVPB Q12H PRN


   PRN Reason: FEVER


   Last Admin: 11/10/19 15:44 Dose:  750 mg


Amlodipine Besylate (Norvasc -)  5 mg PO DAILY [facility]


   Last Admin: 11/11/19 09:02 Dose:  5 mg


Cyanocobalamin (Vitamin B12 -)  1,000 mcg PO DAILY [facility]


   Last Admin: 11/11/19 09:02 Dose:  1,000 mcg


Furosemide (Lasix -)  40 mg PO BID@0600,1400 [facility]


   Last Admin: 11/11/19 06:15 Dose:  40 mg


Lactulose (Cephulac (Oral Use))  20 gm PO DAILY [facility]


Lidocaine (Lidoderm Patch -)  1 patch TP DAILY [facility]


   Last Admin: 11/11/19 09:02 Dose:  1 patch


Melatonin (Melatonin)  3 mg PO HS [facility]


   Last Admin: 11/10/19 21:17 Dose:  3 mg


Methyl Salicylate (Cm-Jarrell -)  1 applic TP BID [facility]


   Last Admin: 11/11/19 09:08 Dose:  1 applic


Metoprolol Succinate (Toprol Xl -)  25 mg PO DAILY [facility]


   Last Admin: 11/11/19 09:02 Dose:  25 mg


Miscellaneous (Lidoderm Patch Removal)  1 each MC DAILY@2200 [facility]


   Last Admin: 11/10/19 21:18 Dose:  Not Given


Ondansetron HCl (Zofran Injection)  4 mg IVPUSH Q6H PRN


   PRN Reason: NAUSEA AND/OR VOMITING


   Last Admin: 11/10/19 09:50 Dose:  4 mg


Pantoprazole Sodium (Protonix -)  40 mg PO BID [facility]


   Last Admin: 11/11/19 09:02 Dose:  40 mg


Polyethylene Glycol (Miralax (For Daily Use) -)  17 gm PO DAILY [facility]


Quetiapine Fumarate (Seroquel -)  12.5 mg PO DAILY@2000 [facility]


   Last Admin: 11/10/19 21:17 Dose:  12.5 mg


Rosuvastatin Calcium (Crestor -)  5 mg PO HS [facility]


   Last Admin: 11/10/19 21:17 Dose:  5 mg


Tramadol HCl (Ultram -)  50 mg PO Q8H PRN


   PRN Reason: PAIN LEVEL 6-10


   Last Admin: 11/10/19 01:43 Dose:  50 mg


Valsartan (Diovan -)  160 mg PO DAILY [facility]


   Last Admin: 11/11/19 09:02 Dose:  160 mg











Constitutional: Yes: NAD 


Eyes: Yes: WNL


HENT: Yes: WNL


Neck: Yes: WNL


Cardiovascular: Yes: Pulse Irregular, S1, S2


Respiratory: Yes: Diminished


Gastrointestinal: Yes: Normal Bowel Sounds, Soft


Extremities: Yes: WNL


Edema: No


Labs: 


  


 Laboratory Results - last 24 hr











  11/10/19 11/10/19 11/11/19





  17:16 17:16 06:07


 


WBC  5.5  


 


RBC  4.33  


 


Hgb  12.7  


 


Hct  38.2  


 


MCV  88.3  


 


MCH  29.3  


 


MCHC  33.1  


 


RDW  14.4  


 


Plt Count  153  


 


MPV  8.1  


 


Absolute Neuts (auto)  4.4  


 


Neutrophils %  79.8  


 


Lymphocytes %  6.7 L  


 


Monocytes %  10.8 H  


 


Eosinophils %  2.0  


 


Basophils %  0.7  


 


Nucleated RBC %  0  


 


Sodium   135 L 


 


Potassium   3.5 


 


Chloride   101 


 


Carbon Dioxide   28 


 


Anion Gap   6 L 


 


BUN   26.0 H 


 


Creatinine   1.1 


 


Est GFR (CKD-EPI)AfAm   72.07 


 


Est GFR (CKD-EPI)NonAf   62.19 


 


Random Glucose   140 H 


 


Calcium   8.9 


 


Magnesium   2.1 


 


Total Bilirubin   1.5 H  2.4 H


 


Direct Bilirubin    1.1 H


 


AST   36  30


 


ALT   93 H  84 H


 


Alkaline Phosphatase   392 H  389 H


 


Total Protein   5.9 L  6.5


 


Albumin   2.8 L  3.0 L














Problem List





- Problems


(1) Altered mental status


Code(s): R41.82 - ALTERED MENTAL STATUS, UNSPECIFIED   





(2) Abnormal liver enzymes


Code(s): R74.8 - ABNORMAL LEVELS OF OTHER SERUM ENZYMES   





(3) Afib


Code(s): I48.91 - UNSPECIFIED ATRIAL FIBRILLATION   





(4) Leukopenia


Code(s): D72.819 - DECREASED WHITE BLOOD CELL COUNT, UNSPECIFIED   





(5) DIPTI (obstructive sleep apnea)


Code(s): G47.33 - OBSTRUCTIVE SLEEP APNEA (ADULT) (PEDIATRIC)   





(6) Thrombocytopenia


Code(s): D69.6 - THROMBOCYTOPENIA, UNSPECIFIED   





(7) Weakness


Code(s): R53.1 - WEAKNESS   





(8) HLD (hyperlipidemia)


Code(s): E78.5 - HYPERLIPIDEMIA, UNSPECIFIED   





(9) HTN (hypertension)


Code(s): I10 - ESSENTIAL (PRIMARY) HYPERTENSION   





(10) S/P aortic valve replacement


Code(s): Z95.2 - PRESENCE OF PROSTHETIC HEART VALVE   





Assessment/Plan


Fever of unknown origin with altered mental status improved. HARRIETT: No 

Endocarditis 


Toxic Metabolic Encephalopathy improved


Low clinical suspicion of Meningitis 


Sepsis


Leukopenia


AF on chronic AC


AS s/p porcine valve 2009


HTN


CHF


DIPTI


COPD


CKD


ELEVATED LFTS


MEDIASTINAL ADENOPATHY





PLAN:


-CTS evaluation with Dr Alfaro for possible C-Med


-Lasix


-BD TX PRN 


-CPAP at night and when sleeping


-Oxygen 


-Rate control





Dr Becerra

## 2019-11-11 NOTE — PN
Progress Note, Physician


Chief Complaint: 





 T .3. Lumbar spine MRI do without 3evidence of osteo. Pt still c/o LBP. 

Confused over night as per nursing staff and family


History of Present Illness: 





Patient is an 84 year old male with a significant past medical history of 

hypertension, hyperlipidemia, prosthetic aortic valve replacement (bovine). 

afib (on eliquis).   [facility], chills, rigors and 

weakness.  patient had rigors at home and brought into the ED by his family for 

further evaluation. 








- Current Medication List


Current Medications: 


Active Medications





Acetaminophen (Ofirmev Injection -)  750 mg IVPB Q12H PRN


   PRN Reason: FEVER


   Last Admin: 11/10/19 15:44 Dose:  750 mg


Amlodipine Besylate (Norvasc -)  5 mg PO DAILY [facility]


   Last Admin: 11/10/19 09:33 Dose:  5 mg


Cyanocobalamin (Vitamin B12 -)  1,000 mcg PO DAILY [facility]


   Last Admin: 11/10/19 09:34 Dose:  1,000 mcg


Furosemide (Lasix -)  40 mg PO BID@0600,1400 [facility]


   Last Admin: 11/11/19 06:15 Dose:  40 mg


Sodium Chloride (Normal Saline -)  1,000 mls @ 125 mls/hr IV ASDIR [facility]


   Last Admin: 11/11/19 06:18 Dose:  Not Given


Lactulose (Cephulac (Oral Use))  20 gm PO BID [facility]


   Last Admin: 11/10/19 21:17 Dose:  20 gm


Lidocaine (Lidoderm Patch -)  1 patch TP DAILY [facility]


   Last Admin: 11/10/19 09:32 Dose:  1 patch


Melatonin (Melatonin)  3 mg PO HS [facility]


   Last Admin: 11/10/19 21:17 Dose:  3 mg


Methyl Salicylate (Cm-Jarrell -)  1 applic TP BID [facility]


   Last Admin: 11/10/19 21:18 Dose:  1 applic


Metoprolol Succinate (Toprol Xl -)  25 mg PO DAILY [facility]


   Last Admin: 11/10/19 09:34 Dose:  25 mg


Miscellaneous (Lidoderm Patch Removal)  1 each MC DAILY@2200 [facility]


   Last Admin: 11/10/19 21:18 Dose:  Not Given


Ondansetron HCl (Zofran Injection)  4 mg IVPUSH Q6H PRN


   PRN Reason: NAUSEA AND/OR VOMITING


   Last Admin: 11/10/19 09:50 Dose:  4 mg


Pantoprazole Sodium (Protonix -)  40 mg PO BID [facility]


   Last Admin: 11/10/19 21:17 Dose:  40 mg


Polyethylene Glycol (Miralax (For Daily Use) -)  17 gm PO DAILY [facility]


Quetiapine Fumarate (Seroquel -)  12.5 mg PO DAILY@2000 [facility]


   Last Admin: 11/10/19 21:17 Dose:  12.5 mg


Rosuvastatin Calcium (Crestor -)  5 mg PO HS [facility]


   Last Admin: 11/10/19 21:17 Dose:  5 mg


Tramadol HCl (Ultram -)  50 mg PO Q8H PRN


   PRN Reason: PAIN LEVEL 6-10


   Last Admin: 11/10/19 01:43 Dose:  50 mg


Valsartan (Diovan -)  160 mg PO DAILY [facility]


   Last Admin: 11/10/19 09:32 Dose:  160 mg











- Objective


Vital Signs: 


 Vital Signs











Temperature  98.7 F   11/11/19 05:46


 


Pulse Rate  72   11/11/19 05:46


 


Respiratory Rate  20   11/11/19 05:46


 


Blood Pressure  137/70   11/11/19 05:46


 


O2 Sat by Pulse Oximetry (%)  96   11/10/19 20:51











Additional Findings/Remarks: 





Constitutional: Yes: Well Nourished, No Distress, Calm


Eyes: Yes: WNL, Conjunctiva Clear


HENT: Yes: WNL, Atraumatic, Normocephalic


Neck: Yes: WNL, Supple, Trachea Midline


Cardiovascular: Yes: WNL, Regular Rate and Rhythm


Respiratory: Yes: Diminished at bases


Gastrointestinal: Yes: WNL, Normal Bowel Sounds, Soft, Abdomen, Obese. No 

palpable masses noted


...Rectal Exam: Yes: Deferred


Genitourinary: Yes: WNL


Breast(s): Yes: WNL


Musculoskeletal: Yes: WNL. Pain to lower lumbar area. No paresthesia or weakness


Extremities: Yes: WNL


Edema: No


Peripheral Pulses WNL: Yes


Peripheral Pulses: Left Radial: 2+, Right Radial: 2+, Left Doralis Pedis: 2+, 

Right Dorsalis Pedis: 2+, Left Femoral: 2+, Right Femoral: 2+


Neurological: Yes:no confusion


...Motor Strength: WNL (generalized weakness)-improving


Psychiatric: Yes: WNL


Labs: 


 CBC, BMP





 11/10/19 17:16 





 11/10/19 17:16 





 INR, PTT











INR  1.05  (0.83-1.09)   11/08/19  08:15    


 


Fibrinogen  229.0 mg/dL (238-498)  L  11/08/19  08:15    














- ....Imaging


Cat Scan: Report Reviewed (CT A/P: irregukar sclerosis L3-4. Questionable 

degenerative changes v osteo. Thickeneing of gallbladder with debridement, 

questionable cyctitis. Right rectus sheath heamatome 4x3x6.))


MRI: Report Reviewed (MRI: no evidence of osteo. Lumbar degeneratitive stenosis)





Problem List





- Problems


(1) Abnormal liver enzymes


Assessment/Plan: 


LFTs continue to trend down, AST/ALT  30/84


NH3 18.5


US with hepatomegaly-fatty liver vs Hepatocellular 


US without mass 


avoid hepatotoxic agents


MRCP revealing no CBD stones, GB stone but no cholecystitis. No need for ERCP 

at this time


CT A/P :Thickeneing of gallbladder with debridement, questionable cyctitis. 

Right rectus sheath hematoma 4x3x6-may be the palpable lesion that was felt 

earlier


will determine if  biopsy will be done on Tues (off eliquis for 72hrs)





Code(s): R74.8 - ABNORMAL LEVELS OF OTHER SERUM ENZYMES   





(2) Afib


Assessment/Plan: 


Rate controlled


c/w toprol


Eliquis held for possible invasive testing-last dose 11/19 @ 11am


c/w tele monitoring


Code(s): I48.91 - UNSPECIFIED ATRIAL FIBRILLATION   





(3) Altered mental status


Assessment/Plan: 


mild confusion persists but redirectable


seroquel started qhs at 12.5  mg


confusion is multifactoriol-infectious, prolonged hospital stay, fevers


consultation with Dr Martines appreciate


plan for fluroscopic guided LP to f/o menengitis or other infectious etiology


fall precautions





Code(s): R41.82 - ALTERED MENTAL STATUS, UNSPECIFIED   





(4) Leukopenia


Assessment/Plan: 


WBC 5.5 today


neutropenia of unclear etiology


viral panel negative, rpr negative, blood smear negative


All Cx are NGTD but still having low grade daily fevers. T max 100


pan cultured NGTD


MRI lumbar spine with osteo


daily cbc 


heme consultation appreciated





Code(s): D72.819 - DECREASED WHITE BLOOD CELL COUNT, UNSPECIFIED   





(5) DIPTI (obstructive sleep apnea)


Assessment/Plan: 


c/w CPAP prn and at night


F/U CHEST CT outpatient 


Code(s): G47.33 - OBSTRUCTIVE SLEEP APNEA (ADULT) (PEDIATRIC)   





(6) Prophylactic measure


Assessment/Plan: 


FEN


cardiac diet


no additional IVF needed


monitor electrolytes








DVT


eliquis back on hold





Dispo


maintain as in patient


full code


discharge planning- family requested outpt PT to continue on discharge


Code(s): Z29.9 - ENCOUNTER FOR PROPHYLACTIC MEASURES, UNSPECIFIED   





(7) Severe sepsis


Assessment/Plan: 


presented with severe sepsis with neutropenia, fevers, malaise, and fatigue/

weakness.  


Fever of unknown origin, continuing daily spikes, off abx  Continue to monitor 

off


tick borne disease panel /viral negative


leptospira pending 


chest ct shows mediastinal adenopaty with moderate increase since prior exam- 

largest lymphnode 2.7cm


pending LP, LN bx


CT with gall stones, no acute lb seen, spine CT does not explain source of 

fever


HARRIETT with no vegetations


appreciate ID consultation


Code(s): A41.9 - SEPSIS, UNSPECIFIED ORGANISM; R65.20 - SEVERE SEPSIS WITHOUT 

SEPTIC SHOCK   





(8) Thrombocytopenia


Assessment/Plan: 


plt ct 137


no signs of bleeding, will monitor


ESR/CRP .8 /SANTOS/RF pending


eliquis back on hold


appreciate hematology consultation





Code(s): D69.6 - THROMBOCYTOPENIA, UNSPECIFIED   





(9) Weakness


Assessment/Plan: 


improving with PT, ambulating the hendricks with assistance of walker


fall precautions


family/pt would like to continue outpatient PT on discharge


Code(s): R53.1 - WEAKNESS   





(10) Toxic metabolic encephalopathy


Assessment/Plan: 


mild confusion persists but redirectable


c/w seroquel 


confusion is multifactoriol-infectious, prolonged hospital stay, fevers


consultation with Dr Martines appreciate


plan for fluroscopic guided LP to f/o menengitis or other infectious etiology


fall precautions





Code(s): G92 - TOXIC ENCEPHALOPATHY   





(11) Mediastinal lymphadenopathy


Assessment/Plan: 


seen in imaging


FUO 


CTS evaluation with Dr Alfaro for possible C-Med to r/o lymphona


Code(s): R59.0 - LOCALIZED ENLARGED LYMPH NODES   





Visit type





- Emergency Visit


Emergency Visit: Yes


ED Registration Date: 11/01/19


Care time: The patient presented to the Emergency Department on the above date 

and was hospitalized for further evaluation of their emergent condition.





- New Patient


This patient is new to me today: No





- Critical Care


Critical Care patient: No





- Discharge Referral


Referred to [facility] No

## 2019-11-11 NOTE — PN.GI
GI Progress Note


Subjective: 





GI NOte: Josh has no GI complaints today but he is confused tonight. Had LP 

earlier which has a negative gram stain but elevated protein. Had a BM 

yesterday. Remains on lactulose. LFTs are lower but have not normalized. 

Periportal and mediastinal lymphadenopathy is noted on the CT. If lymphoma is a 

concern a liver biopsy now that Eliquis has been held may be the best next 

step. I had mentioned this to the family last week and his son was reluctant. 





- Objective


Vital Signs: 


 Vital Signs











Temperature  98.7 F   11/11/19 21:20


 


Pulse Rate  88   11/11/19 21:20


 


Respiratory Rate  20   11/11/19 21:20


 


Blood Pressure  149/86   11/11/19 21:20


 


O2 Sat by Pulse Oximetry (%)  100   11/11/19 20:31








Microbiology





11/11/19 12:00   Cerebral Spinal Fluid - Lumbar Puncture   Streptococcus 

pneumoniae Antigen (M - Final


11/11/19 12:00   Cerebral Spinal Fluid - Lumbar Puncture   Gram Stain - Final





 Laboratory Tests











  11/11/19





  14:53


 


CSF Total Protein  92 H








 Laboratory Tests











  11/06/19 11/06/19 11/09/19





  00:50 00:50 06:00


 


C-Reactive Protein    0.8 H


 


Tumor Marker AFP   


 


SANTOS Screen  Negative  


 


Hep Bs Antigen   Negative 


 


Hep Bs Antibody   Non reactive 


 


Hep B Core Total Ab   Negative 


 


Hep C Ab Diagnostic   0.2 


 


Leptospira IgM Antibody   














  11/09/19





  06:00


 


C-Reactive Protein 


 


Tumor Marker AFP  1.9


 


SANTOS Screen 


 


Hep Bs Antigen 


 


Hep Bs Antibody 


 


Hep B Core Total Ab 


 


Hep C Ab Diagnostic 


 


Leptospira IgM Antibody  Pending











Constitutional: No Distress


...Auscultate: Yes: Normoactive Bowel Sounds


...Palpate: Yes: Soft, Other (nontender)


Labs: 


 CBC, BMP





 11/10/19 17:16 





 11/10/19 17:16 





 INR, PTT











INR  1.05  (0.83-1.09)   11/08/19  08:15    


 


Fibrinogen  229.0 mg/dL (238-498)  L  11/08/19  08:15    














Assessment/Plan





Assessment:


- Fever and abnormal LFTs could reflect a lymphoma given the other 

lymphadenopathy. I would have expected an enlarged spleen as well however. 

Normal AFP and lack of a mass on CT ( no contrast however) argues against a 

hepatoma. If his LFTs fail to normalize he may need a liver biopsy. Given his 

GB wall thickening I will order a Hida scan. 


- Personal h/o a colon adenoma and diverticulosis


- FH stomach cancer





Plan:


-- Hida scan


-- Will discuss liver biopsy with the family





Problem List





- Problems


(1) Lymphadenopathy


Code(s): R59.1 - GENERALIZED ENLARGED LYMPH NODES   





(2) Abnormal liver enzymes


Code(s): R74.8 - ABNORMAL LEVELS OF OTHER SERUM ENZYMES   





(3) Gallstone


Code(s): K80.20 - CALCULUS OF GALLBLADDER W/O CHOLECYSTITIS W/O OBSTRUCTION   





(4) S/P aortic valve replacement


Code(s): Z95.2 - PRESENCE OF PROSTHETIC HEART VALVE   





(5) Colon adenoma


Code(s): D12.6 - BENIGN NEOPLASM OF COLON, UNSPECIFIED   





(6) Diverticulosis


Code(s): K57.90 - DVRTCLOS OF INTEST, PART UNSP, W/O PERF OR ABSCESS W/O BLEED 

  





(7) History of prostate cancer


Code(s): Z85.46 - PERSONAL HISTORY OF MALIGNANT NEOPLASM OF PROSTATE   





(8) Afib


Code(s): I48.91 - UNSPECIFIED ATRIAL FIBRILLATION   





(9) Altered mental status


Code(s): R41.82 - ALTERED MENTAL STATUS, UNSPECIFIED   





(10) Leukopenia


Code(s): D72.819 - DECREASED WHITE BLOOD CELL COUNT, UNSPECIFIED   





(11) Severe sepsis


Code(s): A41.9 - SEPSIS, UNSPECIFIED ORGANISM; R65.20 - SEVERE SEPSIS WITHOUT 

SEPTIC SHOCK   





(12) Thrombocytopenia


Code(s): D69.6 - THROMBOCYTOPENIA, UNSPECIFIED   





(13) HLD (hyperlipidemia)


Code(s): E78.5 - HYPERLIPIDEMIA, UNSPECIFIED   





(14) HTN (hypertension)


Code(s): I10 - ESSENTIAL (PRIMARY) HYPERTENSION   





(15) Family history of gastric cancer


Code(s): Z80.0 - FAMILY HISTORY OF MALIGNANT NEOPLASM OF DIGESTIVE ORGANS

## 2019-11-11 NOTE — PATH
Surgical Pathology Report



Patient Name:  GHAZALA GILMORE

Accession #:  [id number]

[facility]. Rec. #:  [id number]                                                        

   /Age/Gender:  1935 (Age: 84) / M

Account:  [id number]                                                          

             Location: 4  PEDS/ADOL

Taken:  2019

Received:  2019

Reported:  2019

Physicians:  Juana Reddy M.D.

  



Specimen(s) Received

 PERIPHERAL BLOOD 





Clinical History

Pancytopenia

Rule out lymphoproliferative disorder, rule out MDS







Final Diagnosis

COMPREHENS[facility] [place] [place] ([id number]) shows the following:

                                   

INTERPRETATION:  NO ATYPICAL FLOW CYTOMETRIC FINDINGS SEEN  



PHENOTYPE:  Lymphocytes include polyclonal B cells, NK cells and

immunophenotypically normal T cells with a mildly .increased CD4:CD8.

Granulocytes are immunophenotypically mature.



CYTOMORPHOLOGY: Smears from flow sample show no increase in myeloblasts or

atypical lymphocytes.



See [facility] report ([id number]) for additional details.  





***Electronically Signed***

Kelsy Wheeler M.D.



Addendum     

Reported: 11/15/2019



Addendum Diagnosis

MYELODYSPLASIA FISH PANEL performed and [facility] [place] [place] shows the following:



INTERPRETATION:

No evidence of deletion 5q or monosomy 5 is present.

No evidence of deletion 7q or monosomy 7 is present.

No evidence of trisomy 8 (+8) is present.

No evidence of deletion 13q14.2 is present.

No evidence of rearrangement of 11q23.

No evidence of a deletion of the p53 (17p13) locus.

No evidence of deletion 20q12 is present



See [facility] report ([id number]) for additional details.



CYTOGENETIC KARYOTYPE ANALYSIS performed and interpreted at [facility] shows the following:



RESULTS: 

Tissue Culture Failure



INTERPRETATION: 

This unstimulated peripheral blood specimen did not produce any analyzable

metaphase cells and, therefore, chromosome analysis is not possible. A bone

marrow aspirate, when clinically appropriate, is recommended. 



See [facility] report for additional details ([id number]).  





 Kelsy Wheeler M.D.  

 



Gross Description

Received are 2 green top tubes of blood which are sent to [facility].

/2019



saudi

## 2019-11-12 LAB
A2 MACROGLOB SERPL-MCNC: 184 MG/DL (ref 110–276)
ALBUMIN SERPL-MCNC: 2.7 G/DL (ref 3.4–5)
ALP SERPL-CCNC: 327 U/L (ref 45–117)
ALT SERPL-CCNC: 115 IU/L (ref 0–55)
ALT SERPL-CCNC: 59 U/L (ref 13–61)
ANION GAP SERPL CALC-SCNC: 7 MMOL/L (ref 8–16)
APPEARANCE UR: CLEAR
ARTERIAL BLOOD GAS PCO2: 36 MMHG (ref 35–45)
ARTERIAL PATENCY WRIST A: POSITIVE
AST SERPL-CCNC: 23 U/L (ref 15–37)
BACTERIA # UR AUTO: 0.2 /HPF
BASE EXCESS BLDA CALC-SCNC: 6.8 MEQ/L (ref -2–2)
BASOPHILS # BLD: 0.2 % (ref 0–2)
BILIRUB CONJ SERPL-MCNC: 1.1 MG/DL (ref 0–0.2)
BILIRUB SERPL-MCNC: 2 MG/DL (ref 0.2–1)
BILIRUB UR STRIP.AUTO-MCNC: NEGATIVE MG/DL
BUN SERPL-MCNC: 21.1 MG/DL (ref 7–18)
CALCIUM SERPL-MCNC: 8.8 MG/DL (ref 8.5–10.1)
CASTS URNS QL MICRO: 6 /LPF (ref 0–8)
CHLORIDE SERPL-SCNC: 98 MMOL/L (ref 98–107)
CHOLEST SERPL-MCNC: 89 MG/DL (ref 100–199)
CO2 SERPL-SCNC: 31 MMOL/L (ref 21–32)
COLOR UR: (no result)
CREAT SERPL-MCNC: 1 MG/DL (ref 0.55–1.3)
DEPRECATED RDW RBC AUTO: 14.1 % (ref 11.9–15.9)
EOSINOPHIL # BLD: 1.2 % (ref 0–4.5)
EPITH CASTS URNS QL MICRO: 0.8 /HPF
GLUCOSE SERPL-MCNC: 111 MG/DL (ref 74–106)
GLUCOSE SERPLBLD-MCNC: 146 MG/DL (ref 65–99)
HCT VFR BLD CALC: 36.4 % (ref 35.4–49)
HGB BLD-MCNC: 12.3 GM/DL (ref 11.7–16.9)
KETONES UR QL STRIP: NEGATIVE
LEUKOCYTE ESTERASE UR QL STRIP.AUTO: NEGATIVE
LIVER FIBR SCORE SERPL CALC.FIBROSURE: 0.91 (ref 0–0.21)
LYMPHOCYTES # BLD: 7.8 % (ref 8–40)
MAGNESIUM SERPL-MCNC: 2.2 MG/DL (ref 1.8–2.4)
MCH RBC QN AUTO: 29.3 PG (ref 25.7–33.7)
MCHC RBC AUTO-ENTMCNC: 33.9 G/DL (ref 32–35.9)
MCV RBC: 86.4 FL (ref 80–96)
MONOCYTES # BLD AUTO: 9.3 % (ref 3.8–10.2)
NEUTROPHILS # BLD: 81.5 % (ref 42.8–82.8)
NITRITE UR QL STRIP: NEGATIVE
PH UR: 5.5 [PH] (ref 5–8)
PLATELET # BLD AUTO: 178 K/MM3 (ref 134–434)
PMV BLD: 8.1 FL (ref 7.5–11.1)
PO2 BLDA: 65.9 MMHG (ref 80–100)
POTASSIUM SERPLBLD-SCNC: 3 MMOL/L (ref 3.5–5.1)
PROT SERPL-MCNC: 6 G/DL (ref 6.4–8.2)
PROT UR QL STRIP: (no result)
PROT UR QL STRIP: NEGATIVE
RBC # BLD AUTO: 2 /HPF (ref 0–4)
RBC # BLD AUTO: 4.21 M/MM3 (ref 4–5.6)
SAO2 % BLDA: 93.9 % (ref 95–98)
SODIUM SERPL-SCNC: 136 MMOL/L (ref 136–145)
SP GR UR: 1.01 (ref 1.01–1.03)
URATE SERPL-SCNC: 4.3 MG/DL (ref 2.6–7.2)
UROBILINOGEN UR STRIP-MCNC: 2 MG/DL (ref 0.2–1)
WBC # BLD AUTO: 6.1 K/MM3 (ref 4–10)
WBC # UR AUTO: 1 /HPF (ref 0–5)

## 2019-11-12 RX ADMIN — ACETAMINOPHEN PRN MG: 10 INJECTION, SOLUTION INTRAVENOUS at 18:16

## 2019-11-12 RX ADMIN — POTASSIUM CHLORIDE SCH MLS/HR: 7.46 INJECTION, SOLUTION INTRAVENOUS at 13:08

## 2019-11-12 RX ADMIN — FUROSEMIDE SCH MG: 40 TABLET ORAL at 05:42

## 2019-11-12 RX ADMIN — Medication SCH EACH: at 21:51

## 2019-11-12 RX ADMIN — ANALGESIC BALM SCH APPLIC: 1.74; 4.06 OINTMENT TOPICAL at 21:50

## 2019-11-12 RX ADMIN — AMLODIPINE BESYLATE SCH MG: 5 TABLET ORAL at 09:10

## 2019-11-12 RX ADMIN — Medication SCH: at 22:40

## 2019-11-12 RX ADMIN — LACTULOSE SCH GM: 20 SOLUTION ORAL at 15:38

## 2019-11-12 RX ADMIN — ROSUVASTATIN CALCIUM SCH MG: 5 TABLET, FILM COATED ORAL at 21:50

## 2019-11-12 RX ADMIN — PANTOPRAZOLE SODIUM SCH MG: 40 TABLET, DELAYED RELEASE ORAL at 21:50

## 2019-11-12 RX ADMIN — PANTOPRAZOLE SODIUM SCH MG: 40 TABLET, DELAYED RELEASE ORAL at 15:38

## 2019-11-12 RX ADMIN — ANALGESIC BALM SCH APPLIC: 1.74; 4.06 OINTMENT TOPICAL at 09:17

## 2019-11-12 RX ADMIN — POLYETHYLENE GLYCOL 3350 SCH GRAMS: 17 POWDER, FOR SOLUTION ORAL at 18:18

## 2019-11-12 RX ADMIN — CYANOCOBALAMIN TAB 1000 MCG SCH MCG: 1000 TAB at 15:39

## 2019-11-12 RX ADMIN — FUROSEMIDE SCH MG: 40 TABLET ORAL at 15:38

## 2019-11-12 RX ADMIN — LIDOCAINE SCH PATCH: 50 PATCH TOPICAL at 09:10

## 2019-11-12 RX ADMIN — VALSARTAN SCH MG: 160 TABLET, FILM COATED ORAL at 09:10

## 2019-11-12 RX ADMIN — POTASSIUM CHLORIDE SCH MLS/HR: 7.46 INJECTION, SOLUTION INTRAVENOUS at 16:46

## 2019-11-12 NOTE — PN
Physical Exam: 


SUBJECTIVE: Patient seen and examined at the bedside.   sitting in chair.  

denies pain/denies discomfort.  answers some questions appropriately. 





OBJECTIVE:


falls asleep easily and quickly while in chair.  will order abg


hida and brain mri ordered


replace K with 2 K riders and recheck levels 


will order pre and post





abg: respiratory acidosis and metabolic alkalosis with hypoxemia.  pt to have a 

pre and post prior to d/c





Spoke to family: wife and both sons.  Patient has been lethargic for a few days 

and mentation has been waxing and waning.  today, patient was noted to be 

sleepier, and lethargic on my exam.  ABG ordered which shows resp acidosis with 

hypoxemia (oxygen 62.9 on abg).  Family states pt is non compliant with cpap 

and they feel the confusion is worse secondary to hospital stay as he refuses 

to wear oxygen.  They feel he will do better at home.  they want to take him 

home today but are agreeing to have him stay today.  further, will also stop 

all sedating medications (seroquel and ultram) since this may be contributing 

to mental status changes.  Patient had a hida scan today and will he brain mri 

done prior to d/c.  


--------------------------


Patient is an 84 year old male with a significant past medical history of 

hypertension, hyperlipidemia, prosthetic aortic valve replacement (bovine). 

afib (on eliquis).   He presented to the [facility] ED with ataxia, chills, rigors and 

weakness.  Patient was worked up for persistent fevers during hospital stay, no 

def source found.  However, malignancy will need to be excluded and may need 

liver biopsy if patient's family agrees.





Patient now spiking fevers of 102F.  discussed with ID.  will panculture and 

monitor off antibiotics per dr aburto.  





 Vital Signs











 Period  Temp  Pulse  Resp  BP Sys/Rosales  Pulse Ox


 


 Last 24 Hr  97 F-98.7 F  63-88  19-20  116-186/56-86  100








GENERAL: The patient is awake but falls back to sleep easily. on 2 liters nc.  

will order pre and post as well as check abg.


HEAD: Normal with no signs of trauma.


EYES: PERRL, extraocular movements intact, sclera anicteric, conjunctiva clear. 

No ptosis. 


ENT: Ears normal, nares patent, oropharynx clear without exudates, moist mucous 

membranes.


NECK: Trachea midline, full range of motion, supple. 


LUNGS: Breath sounds equal, clear to auscultation bilaterally


HEART:irregular 60s


ABDOMEN: Soft, nontender, nondistended, normoactive bowel sounds, no guarding, 

no 


rebound, no hepatosplenomegaly, no masses.


EXTREMITIES: 2+ pulses, warm, well-perfused, no edema. 


NEUROLOGICAL:  Normal speech, gait not observed.


PSYCH: Normal mood, normal affect.


SKIN: Warm, dry, normal turgor, no rashes or lesions noted


 Laboratory Results - last 24 hr











  11/09/19 11/09/19 11/11/19





  06:00 06:00 12:40


 


WBC   


 


RBC   


 


Hgb   


 


Hct   


 


MCV   


 


MCH   


 


MCHC   


 


RDW   


 


Plt Count   


 


MPV   


 


Absolute Neuts (auto)   


 


Neutrophils %   


 


Lymphocytes %   


 


Monocytes %   


 


Eosinophils %   


 


Basophils %   


 


Nucleated RBC %   


 


Haptoglobin  118  


 


Sodium   


 


Potassium   


 


Chloride   


 


Carbon Dioxide   


 


Anion Gap   


 


BUN   


 


Creatinine   


 


Est GFR (CKD-EPI)AfAm   


 


Est GFR (CKD-EPI)NonAf   


 


Glucose  146 H  


 


Random Glucose   


 


Uric Acid   


 


Calcium   


 


Magnesium   


 


Total Bilirubin  1.4 H  


 


Direct Bilirubin   


 


GGT  324 H  


 


AST  70 H  


 


ALT  115 H  


 


Alkaline Phosphatase   


 


Liver Fibrosis Score  0.91 H  


 


Liver Fibrosis Stage    


 


Liver Steatosis Score  0.96 H  


 


Liver Steatosis Grade    


 


C-Reactive Protein   


 


Total Protein   


 


Albumin   


 


Alpha-2-Macroglobulin  184  


 


Triglycerides  120  


 


Cholesterol  89 L  


 


Apolipoprotein A-1  64 L  


 


Patient Height (cm)  67  


 


Patient Weight (kg)  208  


 


Fluid Glucose    Cancelled


 


Body Fluid LDH Source    Cancelled


 


CSF Appearance   


 


CSF Color   


 


CSF WBC   


 


CSF RBC   


 


CSF Neutrophils   


 


CSF Lymphocytes   


 


CSF Eosinophils   


 


CSF Basophils   


 


CSF Macrophages   


 


CSF Plasma Cells   


 


CSF Diff Comment   


 


CSF Comment   


 


CSF Glucose   


 


CSF Total Protein   


 


CSF IgG Interpretation    


 


CMV IgG Ab   < 0.60 


 


CMV IgM Ab   < 30.0 














  11/11/19 11/12/19 11/12/19





  14:53 06:55 06:55


 


WBC    6.1


 


RBC    4.21


 


Hgb    12.3


 


Hct    36.4


 


MCV    86.4


 


MCH    29.3


 


MCHC    33.9


 


RDW    14.1


 


Plt Count    178


 


MPV    8.1


 


Absolute Neuts (auto)    5.0


 


Neutrophils %    81.5


 


Lymphocytes %    7.8 L


 


Monocytes %    9.3


 


Eosinophils %    1.2


 


Basophils %    0.2


 


Nucleated RBC %    0


 


Haptoglobin   


 


Sodium   136 


 


Potassium   3.0 L 


 


Chloride   98 


 


Carbon Dioxide   31 


 


Anion Gap   7 L 


 


BUN   21.1 H 


 


Creatinine   1.0 


 


Est GFR (CKD-EPI)AfAm   80.88 


 


Est GFR (CKD-EPI)NonAf   69.78 


 


Glucose   


 


Random Glucose   111 H 


 


Uric Acid   4.3 


 


Calcium   8.8 


 


Magnesium   2.2 


 


Total Bilirubin   2.0 H 


 


Direct Bilirubin   1.1 H 


 


GGT   


 


AST   23 


 


ALT   59 


 


Alkaline Phosphatase   327 H 


 


Liver Fibrosis Score   


 


Liver Fibrosis Stage   


 


Liver Steatosis Score   


 


Liver Steatosis Grade   


 


C-Reactive Protein   


 


Total Protein   6.0 L 


 


Albumin   2.7 L 


 


Alpha-2-Macroglobulin   


 


Triglycerides   


 


Cholesterol   


 


Apolipoprotein A-1   


 


Patient Height (cm)   


 


Patient Weight (kg)   


 


Fluid Glucose   


 


Body Fluid LDH Source   


 


CSF Appearance  Clear  


 


CSF Color  Colorless  


 


CSF WBC  2  


 


CSF RBC  3  


 


CSF Neutrophils  No Result Required.  


 


CSF Lymphocytes  No Result Required.  


 


CSF Eosinophils  No Result Required.  


 


CSF Basophils  No Result Required.  


 


CSF Macrophages  No Result Required.  


 


CSF Plasma Cells  No Result Required.  


 


CSF Diff Comment  No Result Required.  


 


CSF Comment  No Result Required.  


 


CSF Glucose  67  


 


CSF Total Protein  92 H  


 


CSF IgG Interpretation   


 


CMV IgG Ab   


 


CMV IgM Ab   














  11/12/19





  06:55


 


WBC 


 


RBC 


 


Hgb 


 


Hct 


 


MCV 


 


MCH 


 


MCHC 


 


RDW 


 


Plt Count 


 


MPV 


 


Absolute Neuts (auto) 


 


Neutrophils % 


 


Lymphocytes % 


 


Monocytes % 


 


Eosinophils % 


 


Basophils % 


 


Nucleated RBC % 


 


Haptoglobin 


 


Sodium 


 


Potassium 


 


Chloride 


 


Carbon Dioxide 


 


Anion Gap 


 


BUN 


 


Creatinine 


 


Est GFR (CKD-EPI)AfAm 


 


Est GFR (CKD-EPI)NonAf 


 


Glucose 


 


Random Glucose 


 


Uric Acid 


 


Calcium 


 


Magnesium 


 


Total Bilirubin 


 


Direct Bilirubin 


 


GGT 


 


AST 


 


ALT 


 


Alkaline Phosphatase 


 


Liver Fibrosis Score 


 


Liver Fibrosis Stage 


 


Liver Steatosis Score 


 


Liver Steatosis Grade 


 


C-Reactive Protein  3.3 H


 


Total Protein 


 


Albumin 


 


Alpha-2-Macroglobulin 


 


Triglycerides 


 


Cholesterol 


 


Apolipoprotein A-1 


 


Patient Height (cm) 


 


Patient Weight (kg) 


 


Fluid Glucose 


 


Body Fluid LDH Source 


 


CSF Appearance 


 


CSF Color 


 


CSF WBC 


 


CSF RBC 


 


CSF Neutrophils 


 


CSF Lymphocytes 


 


CSF Eosinophils 


 


CSF Basophils 


 


CSF Macrophages 


 


CSF Plasma Cells 


 


CSF Diff Comment 


 


CSF Comment 


 


CSF Glucose 


 


CSF Total Protein 


 


CSF IgG Interpretation 


 


CMV IgG Ab 


 


CMV IgM Ab 








Active Medications











Generic Name Dose Route Start Last Admin





  Trade Name Freq  PRN Reason Stop Dose Admin


 


Acetaminophen  750 mg  11/09/19 17:07  11/10/19 15:44





  Ofirmev Injection -  IVPB   750 mg





  Q12H PRN   Administration





  FEVER   





     





     





     


 


Amlodipine Besylate  5 mg  11/05/19 10:00  11/11/19 09:02





  Norvasc -  PO   5 mg





  DAILY LARISA   Administration





     





     





     





     


 


Cyanocobalamin  1,000 mcg  11/05/19 10:00  11/11/19 09:02





  Vitamin B12 -  PO   1,000 mcg





  DAILY LARISA   Administration





     





     





     





     


 


Furosemide  40 mg  11/10/19 06:00  11/12/19 05:42





  Lasix -  PO   40 mg





  BID@0600,1400 LARISA   Administration





     





     





     





     


 


Potassium Chloride  10 meq in 100 mls @ 100 mls/hr  11/12/19 09:15  





  Potassium Chloride 10 Meq Premix Ivpb -  IVPB  11/12/19 11:14  





  Q60M LARISA   





     





     





     





     


 


Lactulose  20 gm  11/12/19 10:00  





  Cephulac (Oral Use)  PO   





  DAILY LARISA   





     





     





     





     


 


Lidocaine  1 patch  11/07/19 14:00  11/11/19 09:02





  Lidoderm Patch -  TP   1 patch





  DAILY LARISA   Administration





     





     





     





     


 


Melatonin  3 mg  11/07/19 22:00  11/11/19 21:35





  Melatonin  PO   3 mg





  HS LARISA   Administration





     





     





     





     


 


Methyl Salicylate  1 applic  11/07/19 10:00  11/11/19 21:35





  Cm-Jarrell -  TP   1 applic





  BID LARISA   Administration





     





     





     





     


 


Metoprolol Succinate  25 mg  11/05/19 10:00  11/11/19 09:02





  Toprol Xl -  PO   25 mg





  DAILY LARISA   Administration





     





     





     





     


 


Miscellaneous  1 each  11/07/19 22:00  11/11/19 21:36





  Lidoderm Patch Removal  MC   1 each





  DAILY@2200 LARISA   Administration





     





     





     





     


 


Ondansetron HCl  4 mg  11/07/19 17:27  11/10/19 09:50





  Zofran Injection  IVPUSH   4 mg





  Q6H PRN   Administration





  NAUSEA AND/OR VOMITING   





     





     





     


 


Pantoprazole Sodium  40 mg  11/05/19 22:00  11/11/19 21:35





  Protonix -  PO   40 mg





  BID LARISA   Administration





     





     





     





     


 


Polyethylene Glycol  17 gm  11/11/19 18:00  11/11/19 18:32





  Miralax (For Daily Use) -  PO   17 grams





  DAILY@1800 LARISA   Administration





     





     





     





     


 


Quetiapine Fumarate  12.5 mg  11/09/19 20:00  11/11/19 21:36





  Seroquel -  PO   12.5 mg





  DAILY@2000 LARISA   Administration





     





     





     





     


 


Rosuvastatin Calcium  5 mg  11/04/19 22:00  11/11/19 21:36





  Crestor -  PO   5 mg





  HS LARISA   Administration





     





     





     





     


 


Tramadol HCl  50 mg  11/08/19 09:48  11/11/19 23:09





  Ultram -  PO   50 mg





  Q8H PRN   Administration





  PAIN LEVEL 6-10   





     





     





     


 


Valsartan  160 mg  11/05/19 10:00  11/11/19 09:02





  Diovan -  PO   160 mg





  DAILY LARISA   Administration





     





     





     





     











ASSESSMENT/PLAN:








Problem List





- Problems


(1) Fever of unknown origin


Assessment/Plan: 


spiked to 102 today, re cultured


discussed with ID and recommended to hold off on initiation of antibiotics at 

this time until source is confirmed.


monitor fever curve, vitals signs


Code(s): R50.9 - FEVER, UNSPECIFIED   





(2) Severe sepsis


Assessment/Plan: 


sepsis resolved, however, again with fevers of 102


fevers of unknown origin


may need liver biopsy to rule out lymphom


discussed with ID, no further antibiotics at this time


Code(s): A41.9 - SEPSIS, UNSPECIFIED ORGANISM; R65.20 - SEVERE SEPSIS WITHOUT 

SEPTIC SHOCK   





(3) DIPTI (obstructive sleep apnea)


Assessment/Plan: 


take off cpap and replaced with bipap for low oxygen levels on abg overnight.  


Code(s): G47.33 - OBSTRUCTIVE SLEEP APNEA (ADULT) (PEDIATRIC)   





(4) HLD (hyperlipidemia)


Assessment/Plan: 


continue home meds


Code(s): E78.5 - HYPERLIPIDEMIA, UNSPECIFIED   





(5) HTN (hypertension)


Assessment/Plan: 


continue cardiac meds


Code(s): I10 - ESSENTIAL (PRIMARY) HYPERTENSION   





(6) S/P aortic valve replacement


Assessment/Plan: 


follows with cardiologist, Dr. Samuel


Code(s): Z95.2 - PRESENCE OF PROSTHETIC HEART VALVE   





(7) Afib


Assessment/Plan: 


 Rate controlled


c/w toprol


Eliquis held for possible invasive testing-last dose 11/9 @ 11am


c/w tele monitoring


Code(s): I48.91 - UNSPECIFIED ATRIAL FIBRILLATION   





(8) Abnormal liver enzymes


Assessment/Plan: 


lfts within normal limits


US with hepatomegaly-fatty liver vs hepatocellular 


US without mass 


avoid hepatotoxic agents


MRCP revealing no CBD stones, GB stone but no cholecystitis. 


for hida scan, pending read


CT A/P:Thickening of gallbladder with debridement, questionable cyctitis. Right 

rectus sheath hematoma 4x3x6-may be the palpable lesion that was felt earlier


will determine if  biopsy will be done 


Code(s): R74.8 - ABNORMAL LEVELS OF OTHER SERUM ENZYMES   





(9) Altered mental status


Assessment/Plan: 


confusion persists.  abg shows respiratory acidosis with hypoxemia.


stop all sedating agents such as ultram and seroquel


for brain mri today


neurology following


Code(s): R41.82 - ALTERED MENTAL STATUS, UNSPECIFIED   





(10) Leukopenia


Assessment/Plan: 


resolved


Code(s): D72.819 - DECREASED WHITE BLOOD CELL COUNT, UNSPECIFIED   





(11) Lymphadenopathy


Assessment/Plan: 


outpatient follow up with Dr. Alfaro 


Code(s): R59.1 - GENERALIZED ENLARGED LYMPH NODES   





(12) Mediastinal lymphadenopathy


Code(s): R59.0 - LOCALIZED ENLARGED LYMPH NODES   





(13) Thrombocytopenia


Assessment/Plan: 


resolved


Code(s): D69.6 - THROMBOCYTOPENIA, UNSPECIFIED   





(14) Toxic metabolic encephalopathy


Assessment/Plan: 


continues to have altered mentation, completed antibiotics per ID.


monitor mental status


brain mri negative


Code(s): G92 - TOXIC ENCEPHALOPATHY   





(15) Prophylactic measure


Assessment/Plan: 


fen


tolerating po


monitor electrolyles


low salt diet as tolerated





on eliquis 5 bid-on hold for possible invasive procedure, if no procedure 

planned, will restart





full code





Code(s): Z29.9 - ENCOUNTER FOR PROPHYLACTIC MEASURES, UNSPECIFIED   





Visit type





- Emergency Visit


Emergency Visit: Yes


ED Registration Date: 11/01/19


Care time: The patient presented to the Emergency Department on the above date 

and was hospitalized for further evaluation of their emergent condition.





- New Patient


This patient is new to me today: No





- Critical Care


Critical Care patient: No





- Discharge Referral


Referred to [facility] No

## 2019-11-12 NOTE — PN.GI
GI Progress Note


Subjective: 





GI NOte: Fever to 102 today. More confused. ABG reveals low O2. LFTs 

normalizing but TB still elevated. HIda reveals only chronic cholecystitis. I 

have advised a liver biopsy and discussed the risk of hemorrhage. The family 

has asked for me to seek a second ID opinion. I have communicated with Martita Mercado NP and comply.





- Objective


Vital Signs: 


 Vital Signs











Temperature  99.4 F   11/12/19 14:17


 


Pulse Rate  68   11/12/19 14:17


 


Respiratory Rate  18   11/12/19 14:17


 


Blood Pressure  154/90   11/12/19 14:17


 


O2 Sat by Pulse Oximetry (%)  98   11/12/19 18:09











Constitutional: Other (confused but responds politely)


...Auscultate: Yes: Normoactive Bowel Sounds


...Palpate: Yes: Soft, Other (nontender)


Extremities: Yes: Other (maculopapular rash on lateral lower extremities)


Labs: 


 CBC, BMP





 11/12/19 06:55 





 11/12/19 14:00 





 INR, PTT











INR  1.05  (0.83-1.09)   11/08/19  08:15    


 


Fibrinogen  229.0 mg/dL (238-498)  L  11/08/19  08:15    














Assessment/Plan





Assessment:


- Fever and abnormal LFTs could reflect a lymphoma given the other 

lymphadenopathy. It could harbor another malignancy, vasculitis or evidence for 

infection.  I did discuss the possibility of an underlying GI or pancreatic 

malignancy but given his respiratory status anesthesia for elective endoscopies 

is too risky. 


- Personal h/o a colon adenoma and diverticulosis


-  stomach cancer





Plan:


-- I will consult Claudio Hall and Aubrey for a second opinion. If they agree 

the family will consent for a liver biopsy. I just discussed the case with Dr Hall and he will see Josh tomorrow


-- Will order Hantavirus Ab. Leptospira Ab still pending


-- Agree with using the larger CPAP machine tonight





Problem List





- Problems


(1) Lymphadenopathy


Code(s): R59.1 - GENERALIZED ENLARGED LYMPH NODES   





(2) Abnormal liver enzymes


Code(s): R74.8 - ABNORMAL LEVELS OF OTHER SERUM ENZYMES   





(3) Gallstone


Code(s): K80.20 - CALCULUS OF GALLBLADDER W/O CHOLECYSTITIS W/O OBSTRUCTION   





(4) S/P aortic valve replacement


Code(s): Z95.2 - PRESENCE OF PROSTHETIC HEART VALVE   





(5) Colon adenoma


Code(s): D12.6 - BENIGN NEOPLASM OF COLON, UNSPECIFIED   





(6) Diverticulosis


Code(s): K57.90 - DVRTCLOS OF INTEST, PART UNSP, W/O PERF OR ABSCESS W/O BLEED 

  





(7) History of prostate cancer


Code(s): Z85.46 - PERSONAL HISTORY OF MALIGNANT NEOPLASM OF PROSTATE   





(8) Afib


Code(s): I48.91 - UNSPECIFIED ATRIAL FIBRILLATION   





(9) Altered mental status


Code(s): R41.82 - ALTERED MENTAL STATUS, UNSPECIFIED   





(10) Leukopenia


Code(s): D72.819 - DECREASED WHITE BLOOD CELL COUNT, UNSPECIFIED   





(11) Severe sepsis


Code(s): A41.9 - SEPSIS, UNSPECIFIED ORGANISM; R65.20 - SEVERE SEPSIS WITHOUT 

SEPTIC SHOCK   





(12) Thrombocytopenia


Code(s): D69.6 - THROMBOCYTOPENIA, UNSPECIFIED   





(13) HLD (hyperlipidemia)


Code(s): E78.5 - HYPERLIPIDEMIA, UNSPECIFIED   





(14) HTN (hypertension)


Code(s): I10 - ESSENTIAL (PRIMARY) HYPERTENSION   





(15) Family history of gastric cancer


Code(s): Z80.0 - FAMILY HISTORY OF MALIGNANT NEOPLASM OF DIGESTIVE ORGANS

## 2019-11-12 NOTE — PN
Progress Note, Physician


History of Present Illness: 





has remained afebrile


still very confused


all tests remain negative so far


family in room


d/w the family in great detail


being worked up from neurology point 





- Current Medication List


Current Medications: 


Active Medications





Acetaminophen (Ofirmev Injection -)  750 mg IVPB Q12H PRN


   PRN Reason: FEVER


   Last Admin: 11/10/19 15:44 Dose:  750 mg


Amlodipine Besylate (Norvasc -)  5 mg PO DAILY [facility]


   Last Admin: 11/12/19 09:10 Dose:  5 mg


Cyanocobalamin (Vitamin B12 -)  1,000 mcg PO DAILY [facility]


   Last Admin: 11/11/19 09:02 Dose:  1,000 mcg


Furosemide (Lasix -)  40 mg PO BID@0600,1400 [facility]


   Last Admin: 11/12/19 05:42 Dose:  40 mg


Lactulose (Cephulac (Oral Use))  20 gm PO DAILY [facility]


Lidocaine (Lidoderm Patch -)  1 patch TP DAILY [facility]


   Last Admin: 11/12/19 09:10 Dose:  1 patch


Melatonin (Melatonin)  3 mg PO HS [facility]


   Last Admin: 11/11/19 21:35 Dose:  3 mg


Methyl Salicylate (Cm-Jarrell -)  1 applic TP BID [facility]


   Last Admin: 11/12/19 09:17 Dose:  1 applic


Metoprolol Succinate (Toprol Xl -)  25 mg PO DAILY [facility]


   Last Admin: 11/12/19 09:10 Dose:  25 mg


Miscellaneous (Lidoderm Patch Removal)  1 each MC DAILY@2200 [facility]


   Last Admin: 11/11/19 21:36 Dose:  1 each


Ondansetron HCl (Zofran Injection)  4 mg IVPUSH Q6H PRN


   PRN Reason: NAUSEA AND/OR VOMITING


   Last Admin: 11/10/19 09:50 Dose:  4 mg


Pantoprazole Sodium (Protonix -)  40 mg PO BID [facility]


   Last Admin: 11/11/19 21:35 Dose:  40 mg


Polyethylene Glycol (Miralax (For Daily Use) -)  17 gm PO DAILY@1800 [facility]


   Last Admin: 11/11/19 18:32 Dose:  17 grams


Quetiapine Fumarate (Seroquel -)  12.5 mg PO DAILY@2000 [facility]


   Last Admin: 11/11/19 21:36 Dose:  12.5 mg


Rosuvastatin Calcium (Crestor -)  5 mg PO HS [facility]


   Last Admin: 11/11/19 21:36 Dose:  5 mg


Tramadol HCl (Ultram -)  50 mg PO Q8H PRN


   PRN Reason: PAIN LEVEL 6-10


   Last Admin: 11/11/19 23:09 Dose:  50 mg


Valsartan (Diovan -)  160 mg PO DAILY [facility]


   Last Admin: 11/12/19 09:10 Dose:  160 mg











- Objective


Vital Signs: 


 Vital Signs











Temperature  98.7 F   11/12/19 09:09


 


Pulse Rate  68   11/12/19 09:09


 


Respiratory Rate  19 11/12/19 09:09


 


Blood Pressure  144/73   11/12/19 09:09


 


O2 Sat by Pulse Oximetry (%)  98   11/12/19 09:00











Constitutional: Yes: No Distress, Calm


Cardiovascular: Yes: S1, S2


Respiratory: Yes: Regular, CTA Bilaterally


Gastrointestinal: Yes: Normal Bowel Sounds, Soft


Musculoskeletal: Yes: WNL


Extremities: Yes: WNL


Integumentary: Yes: Rash (on his legs)


Neurological: Yes: Other (drowsy)


Psychiatric: Yes: Other


Labs: 


 CBC, BMP





 11/12/19 06:55 





 11/12/19 06:55 





 INR, PTT











INR  1.05  (0.83-1.09)   11/08/19  08:15    


 


Fibrinogen  229.0 mg/dL (238-498)  L  11/08/19  08:15    














Assessment/Plan








Problem List





- Problems


(1) Severe sepsis


Code(s): A41.9 - SEPSIS, UNSPECIFIED ORGANISM; R65.20 - SEVERE SEPSIS WITHOUT 

SEPTIC SHOCK   





(2) DIPTI (obstructive sleep apnea)


Code(s): G47.33 - OBSTRUCTIVE SLEEP APNEA (ADULT) (PEDIATRIC)   





(3) HLD (hyperlipidemia)


Code(s): E78.5 - HYPERLIPIDEMIA, UNSPECIFIED   





(4) HTN (hypertension)


Code(s): I10 - ESSENTIAL (PRIMARY) HYPERTENSION   





(5) S/P aortic valve replacement 


Code(s): Z95.2 - PRESENCE OF PROSTHETIC HEART VALVE   





(6) Afib


Code(s): I48.91 - UNSPECIFIED ATRIAL FIBRILLATION   





plan


continue current mgmt


rest as per the team


nutrition


i think patient needs good oxygenation

## 2019-11-12 NOTE — PN
Progress Note (short form)





- Note


Progress Note: 





Mr. Campos was admitted with chills or rigors, cough and dyspnea  Known case 

of hypertension, hypertensive cardiovascular disease, severe left ventricular 

diastolic dysfunction, paroxysmal atrial fibrillation, chronic pulmonary disease

, status post  bioprosthetic aortic valve replacement.





Events noted, [facility] f/u appreciated. Continues to be febrile 

and has periodic confusion. CT of the chest and abdomen reveals increasing 

lymphadenopathy, etiology of FUO is still not determined. He has developed 

petechail rash.


Active Medications











Generic Name Dose Route Start Last Admin





  Trade Name Freq  PRN Reason Stop Dose Admin


 


Acetaminophen  750 mg  11/09/19 17:07  11/12/19 18:16





  Ofirmev Injection -  IVPB   750 mg





  Q12H PRN   Administration





  FEVER   





     





     





     


 


Amlodipine Besylate  5 mg  11/05/19 10:00  11/12/19 09:10





  Norvasc -  PO   5 mg





  DAILY [facility]   Administration





     





     





     





     


 


Cyanocobalamin  1,000 mcg  11/05/19 10:00  11/12/19 15:39





  Vitamin B12 -  PO   1,000 mcg





  DAILY [facility]   Administration





     





     





     





     


 


Furosemide  40 mg  11/10/19 06:00  11/12/19 15:38





  Lasix -  PO   40 mg





  BID@0600,1400 [facility]   Administration





     





     





     





     


 


Lactulose  20 gm  11/12/19 10:00  11/12/19 15:38





  Cephulac (Oral Use)  PO   20 gm





  DAILY [facility]   Administration





     





     





     





     


 


Lidocaine  1 patch  11/07/19 14:00  11/12/19 09:10





  Lidoderm Patch -  TP   1 patch





  DAILY [facility]   Administration





     





     





     





     


 


Melatonin  3 mg  11/07/19 22:00  11/11/19 21:35





  Melatonin  PO   3 mg





  HS [facility]   Administration





     





     





     





     


 


Methyl Salicylate  1 applic  11/07/19 10:00  11/12/19 09:17





  Cm-Jarrell -  TP   1 applic





  BID [facility]   Administration





     





     





     





     


 


Metoprolol Succinate  25 mg  11/05/19 10:00  11/12/19 09:10





  Toprol Xl -  PO   25 mg





  DAILY [facility]   Administration





     





     





     





     


 


Miscellaneous  1 each  11/07/19 22:00  11/11/19 21:36





  Lidoderm Patch Removal  MC   1 each





  DAILY@2200 [facility]   Administration





     





     





     





     


 


Ondansetron HCl  4 mg  11/07/19 17:27  11/10/19 09:50





  Zofran Injection  IVPUSH   4 mg





  Q6H PRN   Administration





  NAUSEA AND/OR VOMITING   





     





     





     


 


Pantoprazole Sodium  40 mg  11/05/19 22:00  11/12/19 15:38





  Protonix -  PO   40 mg





  BID [facility]   Administration





     





     





     





     


 


Polyethylene Glycol  17 gm  11/11/19 18:00  11/12/19 18:18





  Miralax (For Daily Use) -  PO   17 grams





  DAILY@1800 [facility]   Administration





     





     





     





     


 


Rosuvastatin Calcium  5 mg  11/04/19 22:00  11/11/19 21:36





  Crestor -  PO   5 mg





  HS [facility]   Administration





     





     





     





     


 


Valsartan  160 mg  11/05/19 10:00  11/12/19 09:10





  Diovan -  PO   160 mg





  DAILY [facility]   Administration





     





     





     





     











84-year-old gentleman was in no acute distress, no pallor, cyanosis, clubbing 

or jaundice.


 


 


NECK: Supple, no jugular venous distention, hepatojugular reflux was negative, 

carotids were 2+, no lymphadenopthy.


HEART: PMI was not localized, no heaves or thrills, heart sounds were distant, 

ejection systolic murmur grade 2/6 was heard at the second right intercostal 

space ending in early to mid systole.  No diastolic murmur or gallops were 

heard.


LUNGS: decreased breath sounds at both bases.  No extraneous sounds were heard.


Chest: Rash/ petechiae involving the posterior chest.


ABDOMEN: Soft, protuberant, nontender.  No pedal splenomegaly or palpable 

masses were felt.Small nontender mass vs lipoma RUQ.


EXTREMITIES: No calf tenderness or dependent edema, bilateral rash /petechae 

both legs pulses were equal except posterior tibial pulses were not palpable.


 Laboratory Results - last 24 hr











  11/09/19 11/12/19 11/12/19





  06:00 06:55 06:55


 


WBC    6.1


 


RBC    4.21


 


Hgb    12.3


 


Hct    36.4


 


MCV    86.4


 


MCH    29.3


 


MCHC    33.9


 


RDW    14.1


 


Plt Count    178


 


MPV    8.1


 


Absolute Neuts (auto)    5.0


 


Neutrophils %    81.5


 


Lymphocytes %    7.8 L


 


Monocytes %    9.3


 


Eosinophils %    1.2


 


Basophils %    0.2


 


Nucleated RBC %    0


 


Haptoglobin  118  


 


Anticoagulation Therapy   


 


Puncture Site   


 


ABG pH   


 


ABG pCO2 at Pt Temp   


 


ABG pO2 at Pt Temp   


 


ABG HCO3   


 


ABG O2 Sat (Measured)   


 


ABG O2 Content   


 


ABG Base Excess   


 


Italo Test   


 


O2 Delivery Device   


 


Oxygen Flow Rate   


 


Vent Mode   


 


Vent Rate   


 


Mechanical Rate   


 


Pressure Support Vent   


 


Sodium   136 


 


Potassium   3.0 L 


 


Chloride   98 


 


Carbon Dioxide   31 


 


Anion Gap   7 L 


 


BUN   21.1 H 


 


Creatinine   1.0 


 


Est GFR (CKD-EPI)AfAm   80.88 


 


Est GFR (CKD-EPI)NonAf   69.78 


 


Glucose  146 H  


 


Random Glucose   111 H 


 


Uric Acid   4.3 


 


Calcium   8.8 


 


Magnesium   2.2 


 


Total Bilirubin  1.4 H  2.0 H 


 


Direct Bilirubin   1.1 H 


 


GGT  324 H  


 


AST  70 H  23 


 


ALT  115 H  59 


 


Alkaline Phosphatase   327 H 


 


Liver Fibrosis Score  0.91 H  


 


Liver Fibrosis Stage    


 


Liver Steatosis Score  0.96 H  


 


Liver Steatosis Grade    


 


C-Reactive Protein   


 


Total Protein   6.0 L 


 


Albumin   2.7 L 


 


Alpha-2-Macroglobulin  184  


 


Triglycerides  120  


 


Cholesterol  89 L  


 


Apolipoprotein A-1  64 L  


 


Patient Height (cm)  67  


 


Patient Weight (kg)  208  


 


Urine Color   


 


Urine Appearance   


 


Urine pH   


 


Ur Specific Gravity   


 


Urine Protein   


 


Urine Glucose (UA)   


 


Urine Ketones   


 


Urine Blood   


 


Urine Nitrite   


 


Urine Bilirubin   


 


Urine Urobilinogen   


 


Ur Leukocyte Esterase   


 


Urine WBC (Auto)   


 


Urine RBC (Auto)   


 


Urine Casts (Auto)   


 


U Epithel Cells (Auto)   


 


Urine Bacteria (Auto)   


 


CSF IgG Interpretation    














  11/12/19 11/12/19 11/12/19





  06:55 13:04 14:00


 


WBC   


 


RBC   


 


Hgb   


 


Hct   


 


MCV   


 


MCH   


 


MCHC   


 


RDW   


 


Plt Count   


 


MPV   


 


Absolute Neuts (auto)   


 


Neutrophils %   


 


Lymphocytes %   


 


Monocytes %   


 


Eosinophils %   


 


Basophils %   


 


Nucleated RBC %   


 


Haptoglobin   


 


Anticoagulation Therapy   No Result Required. 


 


Puncture Site   No Result Required. 


 


ABG pH   7.52 H 


 


ABG pCO2 at Pt Temp   36.0 


 


ABG pO2 at Pt Temp   65.9 L 


 


ABG HCO3   29.5 H 


 


ABG O2 Sat (Measured)   93.9 L 


 


ABG O2 Content   17.5 


 


ABG Base Excess   6.8 H 


 


Italo Test   Positive 


 


O2 Delivery Device   No Result Required. 


 


Oxygen Flow Rate   Room air 


 


Vent Mode   No Result Required. 


 


Vent Rate   No Result Required. 


 


Mechanical Rate   No Result Required. 


 


Pressure Support Vent   No Result Required. 


 


Sodium   


 


Potassium    3.1 L


 


Chloride   


 


Carbon Dioxide   


 


Anion Gap   


 


BUN   


 


Creatinine   


 


Est GFR (CKD-EPI)AfAm   


 


Est GFR (CKD-EPI)NonAf   


 


Glucose   


 


Random Glucose   


 


Uric Acid   


 


Calcium   


 


Magnesium   


 


Total Bilirubin   


 


Direct Bilirubin   


 


GGT   


 


AST   


 


ALT   


 


Alkaline Phosphatase   


 


Liver Fibrosis Score   


 


Liver Fibrosis Stage   


 


Liver Steatosis Score   


 


Liver Steatosis Grade   


 


C-Reactive Protein  3.3 H  


 


Total Protein   


 


Albumin   


 


Alpha-2-Macroglobulin   


 


Triglycerides   


 


Cholesterol   


 


Apolipoprotein A-1   


 


Patient Height (cm)   


 


Patient Weight (kg)   


 


Urine Color   


 


Urine Appearance   


 


Urine pH   


 


Ur Specific Gravity   


 


Urine Protein   


 


Urine Glucose (UA)   


 


Urine Ketones   


 


Urine Blood   


 


Urine Nitrite   


 


Urine Bilirubin   


 


Urine Urobilinogen   


 


Ur Leukocyte Esterase   


 


Urine WBC (Auto)   


 


Urine RBC (Auto)   


 


Urine Casts (Auto)   


 


U Epithel Cells (Auto)   


 


Urine Bacteria (Auto)   


 


CSF IgG Interpretation   














  11/12/19





  19:00


 


WBC 


 


RBC 


 


Hgb 


 


Hct 


 


MCV 


 


MCH 


 


MCHC 


 


RDW 


 


Plt Count 


 


MPV 


 


Absolute Neuts (auto) 


 


Neutrophils % 


 


Lymphocytes % 


 


Monocytes % 


 


Eosinophils % 


 


Basophils % 


 


Nucleated RBC % 


 


Haptoglobin 


 


Anticoagulation Therapy 


 


Puncture Site 


 


ABG pH 


 


ABG pCO2 at Pt Temp 


 


ABG pO2 at Pt Temp 


 


ABG HCO3 


 


ABG O2 Sat (Measured) 


 


ABG O2 Content 


 


ABG Base Excess 


 


Italo Test 


 


O2 Delivery Device 


 


Oxygen Flow Rate 


 


Vent Mode 


 


Vent Rate 


 


Mechanical Rate 


 


Pressure Support Vent 


 


Sodium 


 


Potassium 


 


Chloride 


 


Carbon Dioxide 


 


Anion Gap 


 


BUN 


 


Creatinine 


 


Est GFR (CKD-EPI)AfAm 


 


Est GFR (CKD-EPI)NonAf 


 


Glucose 


 


Random Glucose 


 


Uric Acid 


 


Calcium 


 


Magnesium 


 


Total Bilirubin 


 


Direct Bilirubin 


 


GGT 


 


AST 


 


ALT 


 


Alkaline Phosphatase 


 


Liver Fibrosis Score 


 


Liver Fibrosis Stage 


 


Liver Steatosis Score 


 


Liver Steatosis Grade 


 


C-Reactive Protein 


 


Total Protein 


 


Albumin 


 


Alpha-2-Macroglobulin 


 


Triglycerides 


 


Cholesterol 


 


Apolipoprotein A-1 


 


Patient Height (cm) 


 


Patient Weight (kg) 


 


Urine Color  Dk yellow


 


Urine Appearance  Clear


 


Urine pH  5.5


 


Ur Specific Gravity  1.015


 


Urine Protein  1+ H


 


Urine Glucose (UA)  Negative


 


Urine Ketones  Negative


 


Urine Blood  Negative


 


Urine Nitrite  Negative


 


Urine Bilirubin  Negative


 


Urine Urobilinogen  2.0


 


Ur Leukocyte Esterase  Negative


 


Urine WBC (Auto)  1


 


Urine RBC (Auto)  2


 


Urine Casts (Auto)  6


 


U Epithel Cells (Auto)  0.8


 


Urine Bacteria (Auto)  0.2


 


CSF IgG Interpretation 











 


 Impression:





1. FUO, associated with abnormal LFT, new rash vs.petechaie,thrombocytopenia 


a). Possibility of reckettial disease needs exclusion.


b). Lymphoma.


c). Malignancy.


2. Hypertension, hypertensive cardiovascular disease.


3. Severe left ventricular diastolic dysfunction.


4. Chronic obstructive pulmonary disease.


5. Status post bioprosthetic aortic valve replacement.


6. Mental confusion, etiology to be determined.


7. Hypoxmia





Recommendations:


1. O2.


2. Resume A/C ASAP.


3.Lymph node biopsy ? right groin.








Prognosis: Critical.

## 2019-11-12 NOTE — PN
Progress Note (short form)





- Note


Progress Note: 


 Neurology 





- Admission


Chief Complaint: Chills, Weakness


History of Present Illness: 


This is a 85 y/o man from home with a PMhx of HTN, HLD, s/p Prosthetic Aortic 

Valve (Bovine) who presented to the ED with his family for ataxia, chills, 

rigors, and weakness. Patient reported feeling well earlier in the day of 

admission but the patient's wife reported they were at the grandkid's house 

earlier where the patient had difficulty getting out of the car and was walking 

like "he lost all his energy." the patient's wife reports she later found the 

patient under a blanket and was shaking. The patient reported he had a rough 

day and felt "terrible." Denied any pain. The patient reports associated 

symptoms of shortness of breath. They called Dr. Samuel (cardiologist), who 

told them to follow up at the ER. Denies worsening shortness of breath, chest 

pain, abdominal pain, nausea, vomiting. Denies cough or congestion. The patient 

has been in the hospital for further medical evaluation and management and I 

was consulted 11/11 for evaluation and management of altered mental status. I 

had an extensive conversation with the hospitalist nurse practitioner, Gricel

, who provided background information regarding the patient who has been 

demonstrating confusion with spiking fevers to 102.  He was again somnolent 

her my encounter but arousable. CT head has been completed and advised to have 

MRI of the brain for further evaluation. Work up for underlying infection has 

been otherwise negative thus far.  Therefore, fever of unknown origin.  Lumbar 

puncture has been completed and I reviewed CSF results which demonstrated 2Wbc 

and increased protein. Infectious disease Dr. Laguna has been following and 

patient previously on broad-spectrum antibiotics but not currently. 





Active Medications





Acetaminophen (Ofirmev Injection -)  750 mg IVPB Q12H PRN


   PRN Reason: FEVER


   Last Admin: 11/10/19 15:44 Dose:  750 mg


Amlodipine Besylate (Norvasc -)  5 mg PO DAILY [facility]


   Last Admin: 11/11/19 09:02 Dose:  5 mg


Cyanocobalamin (Vitamin B12 -)  1,000 mcg PO DAILY [facility]


   Last Admin: 11/11/19 09:02 Dose:  1,000 mcg


Furosemide (Lasix -)  40 mg PO BID@0600,1400 [facility]


   Last Admin: 11/12/19 05:42 Dose:  40 mg


Lactulose (Cephulac (Oral Use))  20 gm PO DAILY [facility]


Lidocaine (Lidoderm Patch -)  1 patch TP DAILY [facility]


   Last Admin: 11/11/19 09:02 Dose:  1 patch


Melatonin (Melatonin)  3 mg PO HS [facility]


   Last Admin: 11/11/19 21:35 Dose:  3 mg


Methyl Salicylate (Cm-Jarrell -)  1 applic TP BID [facility]


   Last Admin: 11/11/19 21:35 Dose:  1 applic


Metoprolol Succinate (Toprol Xl -)  25 mg PO DAILY [facility]


   Last Admin: 11/11/19 09:02 Dose:  25 mg


Miscellaneous (Lidoderm Patch Removal)  1 each MC DAILY@2200 [facility]


   Last Admin: 11/11/19 21:36 Dose:  1 each


Ondansetron HCl (Zofran Injection)  4 mg IVPUSH Q6H PRN


   PRN Reason: NAUSEA AND/OR VOMITING


   Last Admin: 11/10/19 09:50 Dose:  4 mg


Pantoprazole Sodium (Protonix -)  40 mg PO BID [facility]


   Last Admin: 11/11/19 21:35 Dose:  40 mg


Polyethylene Glycol (Miralax (For Daily Use) -)  17 gm PO DAILY@1800 [facility]


   Last Admin: 11/11/19 18:32 Dose:  17 grams


Quetiapine Fumarate (Seroquel -)  12.5 mg PO DAILY@2000 [facility]


   Last Admin: 11/11/19 21:36 Dose:  12.5 mg


Rosuvastatin Calcium (Crestor -)  5 mg PO HS [facility]


   Last Admin: 11/11/19 21:36 Dose:  5 mg


Tramadol HCl (Ultram -)  50 mg PO Q8H PRN


   PRN Reason: PAIN LEVEL 6-10


   Last Admin: 11/11/19 23:09 Dose:  50 mg


Valsartan (Diovan -)  160 mg PO DAILY [facility]


   Last Admin: 11/11/19 09:02 Dose:  160 mg





Physical Examination


Vital Signs: 


  


 Vital Signs











 Period  Temp  Pulse  Resp  BP Sys/Rosales  Pulse Ox


 


 Last 24 Hr  97 F-98.7 F  63-88  19-20  116-186/56-92  








Constitutional: Yes: Well Nourished, No Distress, Calm


Eyes: Yes: WNL, Conjunctiva Clear, EOM Intact, PERRL


HENT: Yes: WNL, Atraumatic, Normocephalic


Neck: Yes: WNL, Supple, Trachea Midline


Cardiovascular: Yes: Other (click)


Respiratory: Yes: WNL, Regular, CTA Bilaterally


Gastrointestinal: Yes: WNL, Normal Bowel Sounds, Soft, Abdomen, Obese


...Rectal Exam: Yes: WNL


Renal/: Yes: WNL


Breast(s): Yes: WNL


Musculoskeletal: Yes: Muscle Weakness


Extremities: Yes: WNL


Edema: No


Peripheral Pulses WNL: Yes


Neurological: somnolent but arousable,  moves extremities grossly, able to tell 

me name of hospital as well as year but not name of the president, sensory 

intact to tactile stimulation


 


 CBCD











WBC  6.1 K/mm3 (4.0-10.0)   11/12/19  06:55    


 


RBC  4.21 M/mm3 (4.00-5.60)   11/12/19  06:55    


 


Hgb  12.3 GM/dL (11.7-16.9)   11/12/19  06:55    


 


Hct  36.4 % (35.4-49)   11/12/19  06:55    


 


MCV  86.4 fl (80-96)   11/12/19  06:55    


 


MCHC  33.9 g/dl (32.0-35.9)   11/12/19  06:55    


 


RDW  14.1 % (11.9-15.9)   11/12/19  06:55    


 


Plt Count  178 K/MM3 (134-434)   11/12/19  06:55    


 


MPV  8.1 fl (7.5-11.1)   11/12/19  06:55    








 CMP











Sodium  136 mmol/L (136-145)   11/12/19  06:55    


 


Potassium  3.0 mmol/L (3.5-5.1)  L  11/12/19  06:55    


 


Chloride  98 mmol/L ()   11/12/19  06:55    


 


Carbon Dioxide  31 mmol/L (21-32)   11/12/19  06:55    


 


Anion Gap  7 MMOL/L (8-16)  L  11/12/19  06:55    


 


BUN  21.1 mg/dL (7-18)  H  11/12/19  06:55    


 


Creatinine  1.0 mg/dL (0.55-1.3)   11/12/19  06:55    


 


Glucose  146 mg/dL (65-99)  H  11/09/19  06:00    


 


Random Glucose  111 mg/dL ()  H  11/12/19  06:55    


 


Calcium  8.8 mg/dL (8.5-10.1)   11/12/19  06:55    


 


Total Bilirubin  2.0 mg/dL (0.2-1)  H  11/12/19  06:55    


 


AST  23 U/L (15-37)   11/12/19  06:55    


 


ALT  59 U/L (13-61)   11/12/19  06:55    


 


Alkaline Phosphatase  327 U/L ()  H  11/12/19  06:55    


 


Total Protein  6.0 g/dl (6.4-8.2)  L  11/12/19  06:55    


 


Albumin  2.7 g/dl (3.4-5.0)  L  11/12/19  06:55    








 CARDIAC ENZYMES











Creatine Kinase  278 U/L ()   11/02/19  05:51    


 


Troponin I  0.05 ng/ml (0.00-0.05)   11/01/19  13:30    














Plan:


85 y/o man from home with a PMhx of HTN, HLD, s/p Prosthetic Aortic Valve (

Bovine) who presented to the ED with his family for ataxia, chills, rigors, and 

weakness. Patient reported feeling well earlier in the day of admission but the 

patient's wife reported they were at the grandkid's house earlier where the 

patient had difficulty getting out of the car and was walking like "he lost all 

his energy." the patient's wife reports she later found the patient under a 

blanket and was shaking. The patient reported he had a rough day and felt 

"terrible." Denied any pain. The patient reports associated symptoms of 

shortness of breath. They called Dr. Samuel (cardiologist), who told them to 

follow up at the ER. Denies worsening shortness of breath, chest pain, 

abdominal pain, nausea, vomiting. Denies cough or congestion. The patient has 

been in the hospital for further medical evaluation and management and I was 

consulted 11/11 for evaluation and management of altered mental status. I had 

an extensive conversation with the hospitalist nurse practitioner, Gricel, 

who provided background information regarding the patient who has been 

demonstrating confusion with spiking fevers to 102.  He was again somnolent 

her my encounter but arousable. CT head has been completed and advised to have 

MRI of the brain for further evaluation. Work up for underlying infection has 

been otherwise negative thus far.  Therefore, fever of unknown origin.  Lumbar 

puncture has been completed and I reviewed CSF results which demonstrated 2Wbc 

and increased protein, normal glucose. Other results pending. Infectious 

disease Dr. Laguna has been following and patient previously on broad-spectrum 

antibiotics but not currently. Follow up infectious management and 

optimization.  Maintain adequate hydration, monitor blood pressure  and 

maintain normotensive range. MRI brain should be completed as soon as able, 

changed order from contrast to noncontrast.

## 2019-11-12 NOTE — PN
Progress Note, Physician





- Current Medication List


Current Medications: 


Active Medications





Acetaminophen (Ofirmev Injection -)  750 mg IVPB Q12H PRN


   PRN Reason: FEVER


   Last Admin: 11/10/19 15:44 Dose:  750 mg


Amlodipine Besylate (Norvasc -)  5 mg PO DAILY [facility]


   Last Admin: 11/12/19 09:10 Dose:  5 mg


Cyanocobalamin (Vitamin B12 -)  1,000 mcg PO DAILY [facility]


   Last Admin: 11/11/19 09:02 Dose:  1,000 mcg


Furosemide (Lasix -)  40 mg PO BID@0600,1400 [facility]


   Last Admin: 11/12/19 05:42 Dose:  40 mg


Lactulose (Cephulac (Oral Use))  20 gm PO DAILY [facility]


Lidocaine (Lidoderm Patch -)  1 patch TP DAILY [facility]


   Last Admin: 11/12/19 09:10 Dose:  1 patch


Melatonin (Melatonin)  3 mg PO HS [facility]


   Last Admin: 11/11/19 21:35 Dose:  3 mg


Methyl Salicylate (Cm-Jarrell -)  1 applic TP BID [facility]


   Last Admin: 11/12/19 09:17 Dose:  1 applic


Metoprolol Succinate (Toprol Xl -)  25 mg PO DAILY [facility]


   Last Admin: 11/12/19 09:10 Dose:  25 mg


Miscellaneous (Lidoderm Patch Removal)  1 each MC DAILY@2200 [facility]


   Last Admin: 11/11/19 21:36 Dose:  1 each


Ondansetron HCl (Zofran Injection)  4 mg IVPUSH Q6H PRN


   PRN Reason: NAUSEA AND/OR VOMITING


   Last Admin: 11/10/19 09:50 Dose:  4 mg


Pantoprazole Sodium (Protonix -)  40 mg PO BID [facility]


   Last Admin: 11/11/19 21:35 Dose:  40 mg


Polyethylene Glycol (Miralax (For Daily Use) -)  17 gm PO DAILY@1800 [facility]


   Last Admin: 11/11/19 18:32 Dose:  17 grams


Quetiapine Fumarate (Seroquel -)  12.5 mg PO DAILY@2000 [facility]


   Last Admin: 11/11/19 21:36 Dose:  12.5 mg


Rosuvastatin Calcium (Crestor -)  5 mg PO HS [facility]


   Last Admin: 11/11/19 21:36 Dose:  5 mg


Tramadol HCl (Ultram -)  50 mg PO Q8H PRN


   PRN Reason: PAIN LEVEL 6-10


   Last Admin: 11/11/19 23:09 Dose:  50 mg


Valsartan (Diovan -)  160 mg PO DAILY [facility]


   Last Admin: 11/12/19 09:10 Dose:  160 mg











- Objective


Vital Signs: 


 Vital Signs











Temperature  98.7 F   11/12/19 09:09


 


Pulse Rate  68   11/12/19 09:09


 


Respiratory Rate  19   11/12/19 09:09


 


Blood Pressure  144/73   11/12/19 09:09


 


O2 Sat by Pulse Oximetry (%)  98   11/12/19 09:00











Labs: 


 CBC, BMP





 11/12/19 06:55 





 11/12/19 06:55 





 INR, PTT











INR  1.05  (0.83-1.09)   11/08/19  08:15    


 


Fibrinogen  229.0 mg/dL (238-498)  L  11/08/19  08:15    














Problem List





- Problems


(1) Altered mental status


Code(s): R41.82 - ALTERED MENTAL STATUS, UNSPECIFIED   





(2) Abnormal liver enzymes


Code(s): R74.8 - ABNORMAL LEVELS OF OTHER SERUM ENZYMES   





(3) Afib


Code(s): I48.91 - UNSPECIFIED ATRIAL FIBRILLATION   





(4) Leukopenia


Code(s): D72.819 - DECREASED WHITE BLOOD CELL COUNT, UNSPECIFIED   





(5) DIPTI (obstructive sleep apnea)


Code(s): G47.33 - OBSTRUCTIVE SLEEP APNEA (ADULT) (PEDIATRIC)   





(6) Thrombocytopenia


Code(s): D69.6 - THROMBOCYTOPENIA, UNSPECIFIED   





(7) Weakness


Code(s): R53.1 - WEAKNESS   





(8) HLD (hyperlipidemia)


Code(s): E78.5 - HYPERLIPIDEMIA, UNSPECIFIED   





(9) HTN (hypertension)


Code(s): I10 - ESSENTIAL (PRIMARY) HYPERTENSION   





(10) S/P aortic valve replacement


Code(s): Z95.2 - PRESENCE OF PROSTHETIC HEART VALVE   





Assessment/Plan








ASSESSMENT:


Fever of unknown origin with altered mental status improved. HARRIETT NO ENDOCARDITIS


Toxic Metabolic Encephalopathy improved


Low clinical suspicion of Meningitis 


Sepsis


Leukopenia


AF on chronic AC


AS s/p porcine valve 2009


HTN


CHF


DIPTI


COPD


CKD


ELEVATED LFTS


MEDIASTINAL ADENOPATHY





PLAN:





-Lasix


-BD TX PRN 


-CPAP at night and when sleeping


-Oxygen 


-Rate control


-Monitor LFTS,cbc


- F/U CHEST CT outpatient 








DR HAGEN

## 2019-11-13 LAB
ALBUMIN SERPL-MCNC: 3 G/DL (ref 3.4–5)
ALP SERPL-CCNC: 375 U/L (ref 45–117)
ALT SERPL-CCNC: 57 U/L (ref 13–61)
ANION GAP SERPL CALC-SCNC: 5 MMOL/L (ref 8–16)
AST SERPL-CCNC: 26 U/L (ref 15–37)
BASOPHILS # BLD: 0.2 % (ref 0–2)
BILIRUB CONJ SERPL-MCNC: 1.6 MG/DL (ref 0–0.2)
BILIRUB SERPL-MCNC: 2.8 MG/DL (ref 0.2–1)
BUN SERPL-MCNC: 21.3 MG/DL (ref 7–18)
CALCIUM SERPL-MCNC: 9.1 MG/DL (ref 8.5–10.1)
CHLORIDE SERPL-SCNC: 96 MMOL/L (ref 98–107)
CO2 SERPL-SCNC: 32 MMOL/L (ref 21–32)
CREAT SERPL-MCNC: 1.1 MG/DL (ref 0.55–1.3)
DEPRECATED RDW RBC AUTO: 14.1 % (ref 11.9–15.9)
EOSINOPHIL # BLD: 0.9 % (ref 0–4.5)
GLUCOSE SERPL-MCNC: 100 MG/DL (ref 74–106)
HCT VFR BLD CALC: 38.8 % (ref 35.4–49)
HGB BLD-MCNC: 13.5 GM/DL (ref 11.7–16.9)
INR BLD: 1.14 (ref 0.83–1.09)
LDH1 CFR SERPL ELPH: 29 % (ref 17–32)
LDH2 CFR SERPL ELPH: 34 % (ref 25–40)
LDH3 CFR SERPL ELPH: 20 % (ref 17–27)
LDH4 CFR SERPL ELPH: 8 % (ref 5–13)
LDH5 CFR SERPL ELPH: 9 % (ref 4–20)
LYMPHOCYTES # BLD: 4.8 % (ref 8–40)
MAGNESIUM SERPL-MCNC: 2.1 MG/DL (ref 1.8–2.4)
MCH RBC QN AUTO: 30.1 PG (ref 25.7–33.7)
MCHC RBC AUTO-ENTMCNC: 34.8 G/DL (ref 32–35.9)
MCV RBC: 86.7 FL (ref 80–96)
MONOCYTES # BLD AUTO: 6.8 % (ref 3.8–10.2)
NEUTROPHILS # BLD: 87.3 % (ref 42.8–82.8)
PLATELET # BLD AUTO: 234 K/MM3 (ref 134–434)
PMV BLD: 8 FL (ref 7.5–11.1)
POTASSIUM SERPLBLD-SCNC: 2.9 MMOL/L (ref 3.5–5.1)
PROT SERPL-MCNC: 6.8 G/DL (ref 6.4–8.2)
PT PNL PPP: 13.5 SEC (ref 9.7–13)
RBC # BLD AUTO: 4.48 M/MM3 (ref 4–5.6)
SODIUM SERPL-SCNC: 134 MMOL/L (ref 136–145)
WBC # BLD AUTO: 7.1 K/MM3 (ref 4–10)

## 2019-11-13 RX ADMIN — ROSUVASTATIN CALCIUM SCH MG: 5 TABLET, FILM COATED ORAL at 21:25

## 2019-11-13 RX ADMIN — POTASSIUM CHLORIDE SCH MLS/HR: 7.46 INJECTION, SOLUTION INTRAVENOUS at 10:46

## 2019-11-13 RX ADMIN — POTASSIUM CHLORIDE SCH MEQ: 1500 TABLET, EXTENDED RELEASE ORAL at 21:25

## 2019-11-13 RX ADMIN — LACTULOSE SCH GM: 20 SOLUTION ORAL at 09:17

## 2019-11-13 RX ADMIN — Medication SCH: at 21:26

## 2019-11-13 RX ADMIN — ACETAMINOPHEN PRN MG: 10 INJECTION, SOLUTION INTRAVENOUS at 09:18

## 2019-11-13 RX ADMIN — AMLODIPINE BESYLATE SCH MG: 5 TABLET ORAL at 09:18

## 2019-11-13 RX ADMIN — FUROSEMIDE SCH MG: 40 TABLET ORAL at 05:52

## 2019-11-13 RX ADMIN — POTASSIUM CHLORIDE SCH MLS/HR: 7.46 INJECTION, SOLUTION INTRAVENOUS at 08:04

## 2019-11-13 RX ADMIN — VALSARTAN SCH MG: 160 TABLET, FILM COATED ORAL at 09:18

## 2019-11-13 RX ADMIN — POTASSIUM CHLORIDE SCH MLS/HR: 7.46 INJECTION, SOLUTION INTRAVENOUS at 12:51

## 2019-11-13 RX ADMIN — ANALGESIC BALM SCH APPLIC: 1.74; 4.06 OINTMENT TOPICAL at 09:19

## 2019-11-13 RX ADMIN — ANALGESIC BALM SCH APPLIC: 1.74; 4.06 OINTMENT TOPICAL at 21:25

## 2019-11-13 RX ADMIN — CYANOCOBALAMIN TAB 1000 MCG SCH MCG: 1000 TAB at 09:18

## 2019-11-13 RX ADMIN — Medication SCH EACH: at 21:25

## 2019-11-13 RX ADMIN — PANTOPRAZOLE SODIUM SCH MG: 40 TABLET, DELAYED RELEASE ORAL at 21:25

## 2019-11-13 RX ADMIN — PANTOPRAZOLE SODIUM SCH MG: 40 TABLET, DELAYED RELEASE ORAL at 09:17

## 2019-11-13 RX ADMIN — LIDOCAINE SCH PATCH: 50 PATCH TOPICAL at 09:17

## 2019-11-13 RX ADMIN — FUROSEMIDE SCH MG: 40 TABLET ORAL at 14:04

## 2019-11-13 NOTE — PN
Progress Note, Physician


History of Present Illness: 





PULMONARY





SLEEPING ON NASAL CANNULA,SLEPT BETTER LAST NIGHT ON BIPAP. PT REMAINS FEBRILE





- Current Medication List


Current Medications: 


Active Medications





Acetaminophen (Ofirmev Injection -)  750 mg IVPB Q12H PRN


   PRN Reason: FEVER


   Last Admin: 11/13/19 09:18 Dose:  750 mg


Amlodipine Besylate (Norvasc -)  5 mg PO DAILY [facility]


   Last Admin: 11/13/19 09:18 Dose:  5 mg


Cyanocobalamin (Vitamin B12 -)  1,000 mcg PO DAILY [facility]


   Last Admin: 11/13/19 09:18 Dose:  1,000 mcg


Furosemide (Lasix -)  40 mg PO BID@0600,1400 [facility]


   Last Admin: 11/13/19 05:52 Dose:  40 mg


Lactulose (Cephulac (Oral Use))  20 gm PO DAILY [facility]


   Last Admin: 11/13/19 09:17 Dose:  20 gm


Lidocaine (Lidoderm Patch -)  1 patch TP DAILY [facility]


   Last Admin: 11/13/19 09:17 Dose:  1 patch


Melatonin (Melatonin)  3 mg PO HS [facility]


   Last Admin: 11/12/19 22:40 Dose:  Not Given


Methyl Salicylate (Cm-Jarrell -)  1 applic TP BID [facility]


   Last Admin: 11/13/19 09:19 Dose:  1 applic


Metoprolol Succinate (Toprol Xl -)  25 mg PO DAILY [facility]


   Last Admin: 11/13/19 09:17 Dose:  25 mg


Miscellaneous (Lidoderm Patch Removal)  1 each MC DAILY@2200 [facility]


   Last Admin: 11/12/19 21:51 Dose:  1 each


Ondansetron HCl (Zofran Injection)  4 mg IVPUSH Q6H PRN


   PRN Reason: NAUSEA AND/OR VOMITING


   Last Admin: 11/10/19 09:50 Dose:  4 mg


Pantoprazole Sodium (Protonix -)  40 mg PO BID [facility]


   Last Admin: 11/13/19 09:17 Dose:  40 mg


Rosuvastatin Calcium (Crestor -)  5 mg PO HS [facility]


   Last Admin: 11/12/19 21:50 Dose:  5 mg


Valsartan (Diovan -)  160 mg PO DAILY [facility]


   Last Admin: 11/13/19 09:18 Dose:  160 mg











- Objective


Vital Signs: 


 Vital Signs











Temperature  101.1 F H  11/13/19 09:15


 


Pulse Rate  72   11/13/19 09:15


 


Respiratory Rate  19   11/13/19 09:15


 


Blood Pressure  137/53 L  11/13/19 09:15


 


O2 Sat by Pulse Oximetry (%)  98   11/13/19 08:49











Constitutional: Yes: No Distress, Other (SLEEPING)


Eyes: Yes: WNL


HENT: Yes: WNL


Neck: Yes: WNL


Cardiovascular: Yes: Pulse Irregular, S1, S2


Respiratory: Yes: Diminished


Gastrointestinal: Yes: Normal Bowel Sounds, Soft


Extremities: Yes: WNL


Edema: Yes


Labs: 


 CBC, BMP





 11/13/19 06:05 





 11/13/19 06:05 





 INR, PTT











INR  1.14  (0.83-1.09)  H  11/13/19  06:05    


 


Fibrinogen  229.0 mg/dL (238-498)  L  11/08/19  08:15    








 Laboratory Tests











  11/12/19





  13:04


 


ABG pH  7.52 H


 


ABG pCO2 at Pt Temp  36.0


 


ABG pO2 at Pt Temp  65.9 L


 


ABG HCO3  29.5 H


 


ABG O2 Sat (Measured)  93.9 L


 


ABG O2 Content  17.5














Problem List





- Problems


(1) Altered mental status


Code(s): R41.82 - ALTERED MENTAL STATUS, UNSPECIFIED   





(2) Abnormal liver enzymes


Code(s): R74.8 - ABNORMAL LEVELS OF OTHER SERUM ENZYMES   





(3) Afib


Code(s): I48.91 - UNSPECIFIED ATRIAL FIBRILLATION   





(4) Leukopenia


Code(s): D72.819 - DECREASED WHITE BLOOD CELL COUNT, UNSPECIFIED   





(5) DIPTI (obstructive sleep apnea)


Code(s): G47.33 - OBSTRUCTIVE SLEEP APNEA (ADULT) (PEDIATRIC)   





(6) Thrombocytopenia


Code(s): D69.6 - THROMBOCYTOPENIA, UNSPECIFIED   





(7) Weakness


Code(s): R53.1 - WEAKNESS   





(8) HLD (hyperlipidemia)


Code(s): E78.5 - HYPERLIPIDEMIA, UNSPECIFIED   





(9) HTN (hypertension)


Code(s): I10 - ESSENTIAL (PRIMARY) HYPERTENSION   





(10) S/P aortic valve replacement


Code(s): Z95.2 - PRESENCE OF PROSTHETIC HEART VALVE   





Assessment/Plan








Problem List





- Problems


(1) Altered mental status


Code(s): R41.82 - ALTERED MENTAL STATUS, UNSPECIFIED   





(2) Abnormal liver enzymes


Code(s): R74.8 - ABNORMAL LEVELS OF OTHER SERUM ENZYMES   





(3) Afib


Code(s): I48.91 - UNSPECIFIED ATRIAL FIBRILLATION   





(4) Leukopenia


Code(s): D72.819 - DECREASED WHITE BLOOD CELL COUNT, UNSPECIFIED   





(5) DIPTI (obstructive sleep apnea)


Code(s): G47.33 - OBSTRUCTIVE SLEEP APNEA (ADULT) (PEDIATRIC)   





(6) Thrombocytopenia


Code(s): D69.6 - THROMBOCYTOPENIA, UNSPECIFIED   





(7) Weakness


Code(s): R53.1 - WEAKNESS   





(8) HLD (hyperlipidemia)


Code(s): E78.5 - HYPERLIPIDEMIA, UNSPECIFIED   





(9) HTN (hypertension)


Code(s): I10 - ESSENTIAL (PRIMARY) HYPERTENSION   





(10) S/P aortic valve replacement


Code(s): Z95.2 - PRESENCE OF PROSTHETIC HEART VALVE   





Assessment/Plan








ASSESSMENT:


Fever of unknown origin with altered mental status improved. HARRIETT NO ENDOCARDITIS


Toxic Metabolic Encephalopathy 


Low clinical suspicion of Meningitis 


Sepsis


Leukopenia


AF on chronic AC


AS s/p porcine valve 2009


HTN


CHF


DIPTI


COPD


ACUTE ON CHRONIC KIDNEY INJURY improved


ELEVATED LFTS improved


MEDIASTINAL ADENOPATHY





PLAN:





-Lasix


-BD TX PRN 


-CPAP at night and when sleeping


-Oxygen 


-Rate control


-Monitor lytes,cbc


- chest x-ray


- abx as per ID


- repete lytes


- cultures








DR HAGEN

## 2019-11-13 NOTE — PN
Progress Note (short form)





- Note


Progress Note: 





Mr. Campos was admitted with chills or rigors, cough and dyspnea  Known case 

of hypertension, hypertensive cardiovascular disease, severe left ventricular 

diastolic dysfunction, paroxysmal atrial fibrillation, chronic pulmonary disease

, status post  bioprosthetic aortic valve replacement and OSAS.





 Continues to be febrile and has had periods significant confusion. Etiology of 

FUO is still not determined. Petechail rash persists. LFTs and CBC results are 

improving. Appetite has also improved. spoke to wife and son at length and 

waitng for a 2nd ID opinion.





Active Medications





Acetaminophen (Ofirmev Injection -)  750 mg IVPB Q12H PRN


   PRN Reason: FEVER


   Last Admin: 11/13/19 09:18 Dose:  750 mg


Amlodipine Besylate (Norvasc -)  5 mg PO DAILY [facility]


   Last Admin: 11/13/19 09:18 Dose:  5 mg


Cyanocobalamin (Vitamin B12 -)  1,000 mcg PO DAILY [facility]


   Last Admin: 11/13/19 09:18 Dose:  1,000 mcg


Furosemide (Lasix -)  40 mg PO BID@0600,1400 [facility]


   Last Admin: 11/13/19 05:52 Dose:  40 mg


Lactulose (Cephulac (Oral Use))  20 gm PO DAILY [facility]


   Last Admin: 11/13/19 09:17 Dose:  20 gm


Lidocaine (Lidoderm Patch -)  1 patch TP DAILY [facility]


   Last Admin: 11/13/19 09:17 Dose:  1 patch


Melatonin (Melatonin)  3 mg PO HS [facility]


   Last Admin: 11/12/19 22:40 Dose:  Not Given


Methyl Salicylate (Cm-Jarrell -)  1 applic TP BID [facility]


   Last Admin: 11/13/19 09:19 Dose:  1 applic


Metoprolol Succinate (Toprol Xl -)  25 mg PO DAILY [facility]


   Last Admin: 11/13/19 09:17 Dose:  25 mg


Miscellaneous (Lidoderm Patch Removal)  1 each MC DAILY@2200 [facility]


   Last Admin: 11/12/19 21:51 Dose:  1 each


Ondansetron HCl (Zofran Injection)  4 mg IVPUSH Q6H PRN


   PRN Reason: NAUSEA AND/OR VOMITING


   Last Admin: 11/10/19 09:50 Dose:  4 mg


Pantoprazole Sodium (Protonix -)  40 mg PO BID [facility]


   Last Admin: 11/13/19 09:17 Dose:  40 mg


Rosuvastatin Calcium (Crestor -)  5 mg PO HS [facility]


   Last Admin: 11/12/19 21:50 Dose:  5 mg


Valsartan (Diovan -)  160 mg PO DAILY [facility]


   Last Admin: 11/13/19 09:18 Dose:  160 mg








84-year-old gentleman was in no acute distress, no pallor, cyanosis, clubbing 

or jaundice.


 


 Last Vital Signs











Temp Pulse Resp BP Pulse Ox


 


 101.1 F H  72   19   137/53 L  98 


 


 11/13/19 09:15  11/13/19 09:15  11/13/19 09:15  11/13/19 09:15  11/13/19 09:00











NECK: Supple, no jugular venous distention, hepato jugular reflux was negative, 

carotids were 2+, no lymphadenopthy.


HEART: PMI was not localized, no heaves or thrills, heart sounds were distant, 

ejection systolic murmur grade 2/6 was heard at the second right intercostal 

space ending in early to mid systole.  No diastolic murmur or gallops were 

heard.


LUNGS: decreased breath sounds at both bases.  No extraneous sounds were heard.


Chest: Rash/ petechiae involving the posterior chest.


ABDOMEN: Soft, protuberant, nontender. No hepatosplenomegaly or palpable masses 

were felt.Small nontender mass vs lipoma RUQ.


EXTREMITIES: No calf tenderness or dependent edema, bilateral rash /petechae 

both legs pulses were equal except posterior tibial pulses were not palpable. 

Swelling of the left knee, nontender


 


 Laboratory Results - last 24 hr











  11/12/19 11/12/19 11/12/19





  06:55 13:04 14:00


 


WBC   


 


RBC   


 


Hgb   


 


Hct   


 


MCV   


 


MCH   


 


MCHC   


 


RDW   


 


Plt Count   


 


MPV   


 


Absolute Neuts (auto)   


 


Neutrophils %   


 


Lymphocytes %   


 


Monocytes %   


 


Eosinophils %   


 


Basophils %   


 


Nucleated RBC %   


 


PT with INR   


 


INR   


 


Anticoagulation Therapy   No Result Required. 


 


Puncture Site   No Result Required. 


 


ABG pH   7.52 H 


 


ABG pCO2 at Pt Temp   36.0 


 


ABG pO2 at Pt Temp   65.9 L 


 


ABG HCO3   29.5 H 


 


ABG O2 Sat (Measured)   93.9 L 


 


ABG O2 Content   17.5 


 


ABG Base Excess   6.8 H 


 


Italo Test   Positive 


 


O2 Delivery Device   No Result Required. 


 


Oxygen Flow Rate   Room air 


 


Vent Mode   No Result Required. 


 


Vent Rate   No Result Required. 


 


Mechanical Rate   No Result Required. 


 


Pressure Support Vent   No Result Required. 


 


Sodium   


 


Potassium    3.1 L


 


Chloride   


 


Carbon Dioxide   


 


Anion Gap   


 


BUN   


 


Creatinine   


 


Est GFR (CKD-EPI)AfAm   


 


Est GFR (CKD-EPI)NonAf   


 


Random Glucose   


 


Calcium   


 


Magnesium   


 


Total Bilirubin   


 


Direct Bilirubin   


 


AST   


 


ALT   


 


Alkaline Phosphatase   


 


C-Reactive Protein   


 


Total Protein   


 


Albumin   


 


CA 19-9 Antigen  34  


 


Urine Color   


 


Urine Appearance   


 


Urine pH   


 


Ur Specific Gravity   


 


Urine Protein   


 


Urine Glucose (UA)   


 


Urine Ketones   


 


Urine Blood   


 


Urine Nitrite   


 


Urine Bilirubin   


 


Urine Urobilinogen   


 


Ur Leukocyte Esterase   


 


Urine WBC (Auto)   


 


Urine RBC (Auto)   


 


Urine Casts (Auto)   


 


U Epithel Cells (Auto)   


 


Urine Bacteria (Auto)   














  11/12/19 11/12/19 11/13/19





  19:00 21:00 06:05


 


WBC   


 


RBC   


 


Hgb   


 


Hct   


 


MCV   


 


MCH   


 


MCHC   


 


RDW   


 


Plt Count   


 


MPV   


 


Absolute Neuts (auto)   


 


Neutrophils %   


 


Lymphocytes %   


 


Monocytes %   


 


Eosinophils %   


 


Basophils %   


 


Nucleated RBC %   


 


PT with INR   


 


INR   


 


Anticoagulation Therapy   


 


Puncture Site   


 


ABG pH   


 


ABG pCO2 at Pt Temp   


 


ABG pO2 at Pt Temp   


 


ABG HCO3   


 


ABG O2 Sat (Measured)   


 


ABG O2 Content   


 


ABG Base Excess   


 


Italo Test   


 


O2 Delivery Device   


 


Oxygen Flow Rate   


 


Vent Mode   


 


Vent Rate   


 


Mechanical Rate   


 


Pressure Support Vent   


 


Sodium    134 L


 


Potassium   3.3 L  2.9 L*


 


Chloride    96 L


 


Carbon Dioxide    32


 


Anion Gap    5 L


 


BUN    21.3 H


 


Creatinine    1.1


 


Est GFR (CKD-EPI)AfAm    72.07


 


Est GFR (CKD-EPI)NonAf    62.19


 


Random Glucose    100


 


Calcium    9.1


 


Magnesium    2.1


 


Total Bilirubin    2.8 H


 


Direct Bilirubin    1.6 H


 


AST    26


 


ALT    57


 


Alkaline Phosphatase    375 H


 


C-Reactive Protein   


 


Total Protein    6.8


 


Albumin    3.0 L


 


CA 19-9 Antigen   


 


Urine Color  Dk yellow  


 


Urine Appearance  Clear  


 


Urine pH  5.5  


 


Ur Specific Gravity  1.015  


 


Urine Protein  1+ H  


 


Urine Glucose (UA)  Negative  


 


Urine Ketones  Negative  


 


Urine Blood  Negative  


 


Urine Nitrite  Negative  


 


Urine Bilirubin  Negative  


 


Urine Urobilinogen  2.0  


 


Ur Leukocyte Esterase  Negative  


 


Urine WBC (Auto)  1  


 


Urine RBC (Auto)  2  


 


Urine Casts (Auto)  6  


 


U Epithel Cells (Auto)  0.8  


 


Urine Bacteria (Auto)  0.2  














  11/13/19 11/13/19 11/13/19





  06:05 06:05 06:05


 


WBC  7.1  


 


RBC  4.48  


 


Hgb  13.5  


 


Hct  38.8  


 


MCV  86.7  


 


MCH  30.1  


 


MCHC  34.8  


 


RDW  14.1  


 


Plt Count  234  D  


 


MPV  8.0  


 


Absolute Neuts (auto)  6.2  


 


Neutrophils %  87.3 H  


 


Lymphocytes %  4.8 L D  


 


Monocytes %  6.8  


 


Eosinophils %  0.9  


 


Basophils %  0.2  


 


Nucleated RBC %  0  


 


PT with INR    13.50 H


 


INR    1.14 H


 


Anticoagulation Therapy   


 


Puncture Site   


 


ABG pH   


 


ABG pCO2 at Pt Temp   


 


ABG pO2 at Pt Temp   


 


ABG HCO3   


 


ABG O2 Sat (Measured)   


 


ABG O2 Content   


 


ABG Base Excess   


 


Italo Test   


 


O2 Delivery Device   


 


Oxygen Flow Rate   


 


Vent Mode   


 


Vent Rate   


 


Mechanical Rate   


 


Pressure Support Vent   


 


Sodium   


 


Potassium   


 


Chloride   


 


Carbon Dioxide   


 


Anion Gap   


 


BUN   


 


Creatinine   


 


Est GFR (CKD-EPI)AfAm   


 


Est GFR (CKD-EPI)NonAf   


 


Random Glucose   


 


Calcium   


 


Magnesium   


 


Total Bilirubin   


 


Direct Bilirubin   


 


AST   


 


ALT   


 


Alkaline Phosphatase   


 


C-Reactive Protein   7.3 H 


 


Total Protein   


 


Albumin   


 


CA 19-9 Antigen   


 


Urine Color   


 


Urine Appearance   


 


Urine pH   


 


Ur Specific Gravity   


 


Urine Protein   


 


Urine Glucose (UA)   


 


Urine Ketones   


 


Urine Blood   


 


Urine Nitrite   


 


Urine Bilirubin   


 


Urine Urobilinogen   


 


Ur Leukocyte Esterase   


 


Urine WBC (Auto)   


 


Urine RBC (Auto)   


 


Urine Casts (Auto)   


 


U Epithel Cells (Auto)   


 


Urine Bacteria (Auto)   











 Impression:





1. FUO, associated with abnormal LFT, new rash vs.petechaie,thrombocytopenia 


a). Possibility of reckettial disease needs exclusion.


b). Lymphoma.


c). Malignancy.


2. Hypertension, hypertensive cardiovascular disease.


3. Severe left ventricular diastolic dysfunction.


4. Chronic obstructive pulmonary disease.


5. Status post bioprosthetic aortic valve replacement.


6. Mental confusion, etiology to be determined.


7. Hypoxmia.


8. Hypokalemia, etiology to be determined





Recommendations:


1. O2.


2. Resume A/C ASAP now that platelet, have normalised unless a biopsy is being 

consideredin the day or two.


3. Lymph node biopsy ? right groin.


4. Awaiting ID second opinion.


5. Correction of K+ in progress.


6. Check urine lytes.


7. F/u ECG.











Prognosis: Critical.


Time spent face ti face and  40 mins.

## 2019-11-13 NOTE — PN
Progress Note, Physician


History of Present Illness: 





bipap changed


patient more awake and alert


still spiked a fever


clinically looks more better


wife in the room





- Current Medication List


Current Medications: 


Active Medications





Acetaminophen (Ofirmev Injection -)  750 mg IVPB Q12H PRN


   PRN Reason: FEVER


   Last Admin: 11/13/19 09:18 Dose:  750 mg


Amlodipine Besylate (Norvasc -)  5 mg PO DAILY [facility]


   Last Admin: 11/13/19 09:18 Dose:  5 mg


Cyanocobalamin (Vitamin B12 -)  1,000 mcg PO DAILY [facility]


   Last Admin: 11/13/19 09:18 Dose:  1,000 mcg


Furosemide (Lasix -)  40 mg PO BID@0600,1400 [facility]


   Last Admin: 11/13/19 05:52 Dose:  40 mg


Lactulose (Cephulac (Oral Use))  20 gm PO DAILY [facility]


   Last Admin: 11/13/19 09:17 Dose:  20 gm


Lidocaine (Lidoderm Patch -)  1 patch TP DAILY [facility]


   Last Admin: 11/13/19 09:17 Dose:  1 patch


Melatonin (Melatonin)  3 mg PO HS [facility]


   Last Admin: 11/12/19 22:40 Dose:  Not Given


Methyl Salicylate (Cm-Jarrell -)  1 applic TP BID [facility]


   Last Admin: 11/13/19 09:19 Dose:  1 applic


Metoprolol Succinate (Toprol Xl -)  25 mg PO DAILY [facility]


   Last Admin: 11/13/19 09:17 Dose:  25 mg


Miscellaneous (Lidoderm Patch Removal)  1 each MC DAILY@2200 [facility]


   Last Admin: 11/12/19 21:51 Dose:  1 each


Ondansetron HCl (Zofran Injection)  4 mg IVPUSH Q6H PRN


   PRN Reason: NAUSEA AND/OR VOMITING


   Last Admin: 11/10/19 09:50 Dose:  4 mg


Pantoprazole Sodium (Protonix -)  40 mg PO BID [facility]


   Last Admin: 11/13/19 09:17 Dose:  40 mg


Rosuvastatin Calcium (Crestor -)  5 mg PO HS [facility]


   Last Admin: 11/12/19 21:50 Dose:  5 mg


Valsartan (Diovan -)  160 mg PO DAILY [facility]


   Last Admin: 11/13/19 09:18 Dose:  160 mg











- Objective


Vital Signs: 


 Vital Signs











Temperature  101.1 F H  11/13/19 09:15


 


Pulse Rate  72   11/13/19 09:15


 


Respiratory Rate  19   11/13/19 09:15


 


Blood Pressure  137/53 L  11/13/19 09:15


 


O2 Sat by Pulse Oximetry (%)  98   11/13/19 08:49











Constitutional: Yes: No Distress, Calm


Cardiovascular: Yes: S1, S2


Respiratory: Yes: Regular, CTA Bilaterally


Gastrointestinal: Yes: Normal Bowel Sounds, Soft


Musculoskeletal: Yes: WNL


Extremities: Yes: WNL


Integumentary: Yes: Rash (on the ext)


Neurological: Yes: Alert, Oriented


Labs: 


 CBC, BMP





 11/13/19 06:05 





 11/13/19 06:05 





 INR, PTT











INR  1.14  (0.83-1.09)  H  11/13/19  06:05    


 


Fibrinogen  229.0 mg/dL (238-498)  L  11/08/19  08:15    














Assessment/Plan








Problem List





- Problems


(1) Severe sepsis


Code(s): A41.9 - SEPSIS, UNSPECIFIED ORGANISM; R65.20 - SEVERE SEPSIS WITHOUT 

SEPTIC SHOCK   





(2) DIPTI (obstructive sleep apnea)


Code(s): G47.33 - OBSTRUCTIVE SLEEP APNEA (ADULT) (PEDIATRIC)   





(3) HLD (hyperlipidemia)


Code(s): E78.5 - HYPERLIPIDEMIA, UNSPECIFIED   





(4) HTN (hypertension)


Code(s): I10 - ESSENTIAL (PRIMARY) HYPERTENSION   





(5) S/P aortic valve replacement 


Code(s): Z95.2 - PRESENCE OF PROSTHETIC HEART VALVE   





(6) Afib


Code(s): I48.91 - UNSPECIFIED ATRIAL FIBRILLATION   





plan


continue current mgmt


rest as per the team


nutrition


continue bipap


also ordered test for leptospirosis


will see how the patient does

## 2019-11-13 NOTE — PN
Physical Exam: 


SUBJECTIVE: Patient seen and examined at the bedside.  more awake and alert.  

allowed bipap overnight and mentation seems to be improved.





patient is more awake, alert, follows commands.  knows his name, , month and 

year.  knows thanksgiving is in 2 weeks.  partially knows where he is "[employer] 

in the [place]", does not know president name, has 5 kids he tells me and 9 

grandkids.





OBJECTIVE:





has petecial rash/vs drug rash? on right back-pink raised, rash extends from 

right posterior leg to right thigh and covers most of his right back.  non 

itchy. 





Patient is an 84 year old male with a significant past medical history of 

hypertension, hyperlipidemia, prosthetic aortic valve replacement (bovine). 

afib (on eliquis).   He presented to the [facility] ED with ataxia, chills, rigors and 

weakness.  Patient was worked up for persistent fevers during hospital stay, no 

def source found.  However, malignancy will need to be excluded and may need 

liver biopsy if patient's family agrees.





K 2.9, repleted with 3 k riders, will add supplements to diet for poor oral 

intake


hold all sedating medications


will have SW address home cpap machine with company to assure that is is 

working properly





 Vital Signs











 Period  Temp  Pulse  Resp  BP Sys/Rosales  Pulse Ox


 


 Last 24 Hr  97.6 F-102.0 F  65-80  18-20  144-174/56-90  96-98








GENERAL: The patient is awake, alert, and fully oriented, except for name of 

president.  see above.


HEAD: Normal with no signs of trauma.


EYES: PERRL, extraocular movements intact, sclera anicteric, conjunctiva clear. 

No ptosis. 


ENT: Ears normal, nares patent, oropharynx clear without exudates, moist mucous 

membranes.


NECK: Trachea midline, full range of motion, supple. 


LUNGS: Breath sounds equal, clear to auscultation bilaterally, no wheezes 


HEART: Regular rate and rhythm @ 71


ABDOMEN: Soft, nontender, nondistended, normoactive bowel sounds, no guarding


EXTREMITIES: 2+ pulses, warm, well-perfused, no edema. 


NEUROLOGICAL:  Normal speech, gait not observed. can stand, will order PT


PSYCH: Normal mood, normal affect.


SKIN: raised pink diffused rash that extends from right posterior leg>right 

thigh>right upper back











 Laboratory Results - last 24 hr











  19





  06:55 06:55 06:55


 


WBC   6.1 


 


RBC   4.21 


 


Hgb   12.3 


 


Hct   36.4 


 


MCV   86.4 


 


MCH   29.3 


 


MCHC   33.9 


 


RDW   14.1 


 


Plt Count   178 


 


MPV   8.1 


 


Absolute Neuts (auto)   5.0 


 


Neutrophils %   81.5 


 


Lymphocytes %   7.8 L 


 


Monocytes %   9.3 


 


Eosinophils %   1.2 


 


Basophils %   0.2 


 


Nucleated RBC %   0 


 


PT with INR   


 


INR   


 


Anticoagulation Therapy   


 


Puncture Site   


 


ABG pH   


 


ABG pCO2 at Pt Temp   


 


ABG pO2 at Pt Temp   


 


ABG HCO3   


 


ABG O2 Sat (Measured)   


 


ABG O2 Content   


 


ABG Base Excess   


 


Italo Test   


 


O2 Delivery Device   


 


Oxygen Flow Rate   


 


Vent Mode   


 


Vent Rate   


 


Mechanical Rate   


 


Pressure Support Vent   


 


Sodium  136  


 


Potassium  3.0 L  


 


Chloride  98  


 


Carbon Dioxide  31  


 


Anion Gap  7 L  


 


BUN  21.1 H  


 


Creatinine  1.0  


 


Est GFR (CKD-EPI)AfAm  80.88  


 


Est GFR (CKD-EPI)NonAf  69.78  


 


Random Glucose  111 H  


 


Uric Acid  4.3  


 


Calcium  8.8  


 


Magnesium  2.2  


 


Total Bilirubin  2.0 H  


 


Direct Bilirubin  1.1 H  


 


AST  23  


 


ALT  59  


 


Alkaline Phosphatase  327 H  


 


C-Reactive Protein   


 


Total Protein  6.0 L  


 


Albumin  2.7 L  


 


CA 19-9 Antigen    34


 


Urine Color   


 


Urine Appearance   


 


Urine pH   


 


Ur Specific Gravity   


 


Urine Protein   


 


Urine Glucose (UA)   


 


Urine Ketones   


 


Urine Blood   


 


Urine Nitrite   


 


Urine Bilirubin   


 


Urine Urobilinogen   


 


Ur Leukocyte Esterase   


 


Urine WBC (Auto)   


 


Urine RBC (Auto)   


 


Urine Casts (Auto)   


 


U Epithel Cells (Auto)   


 


Urine Bacteria (Auto)   














  19





  13:04 14:00 19:00


 


WBC   


 


RBC   


 


Hgb   


 


Hct   


 


MCV   


 


MCH   


 


MCHC   


 


RDW   


 


Plt Count   


 


MPV   


 


Absolute Neuts (auto)   


 


Neutrophils %   


 


Lymphocytes %   


 


Monocytes %   


 


Eosinophils %   


 


Basophils %   


 


Nucleated RBC %   


 


PT with INR   


 


INR   


 


Anticoagulation Therapy  No Result Required.  


 


Puncture Site  No Result Required.  


 


ABG pH  7.52 H  


 


ABG pCO2 at Pt Temp  36.0  


 


ABG pO2 at Pt Temp  65.9 L  


 


ABG HCO3  29.5 H  


 


ABG O2 Sat (Measured)  93.9 L  


 


ABG O2 Content  17.5  


 


ABG Base Excess  6.8 H  


 


Italo Test  Positive  


 


O2 Delivery Device  No Result Required.  


 


Oxygen Flow Rate  Room air  


 


Vent Mode  No Result Required.  


 


Vent Rate  No Result Required.  


 


Mechanical Rate  No Result Required.  


 


Pressure Support Vent  No Result Required.  


 


Sodium   


 


Potassium   3.1 L 


 


Chloride   


 


Carbon Dioxide   


 


Anion Gap   


 


BUN   


 


Creatinine   


 


Est GFR (CKD-EPI)AfAm   


 


Est GFR (CKD-EPI)NonAf   


 


Random Glucose   


 


Uric Acid   


 


Calcium   


 


Magnesium   


 


Total Bilirubin   


 


Direct Bilirubin   


 


AST   


 


ALT   


 


Alkaline Phosphatase   


 


C-Reactive Protein   


 


Total Protein   


 


Albumin   


 


CA 19-9 Antigen   


 


Urine Color    Dk yellow


 


Urine Appearance    Clear


 


Urine pH    5.5


 


Ur Specific Gravity    1.015


 


Urine Protein    1+ H


 


Urine Glucose (UA)    Negative


 


Urine Ketones    Negative


 


Urine Blood    Negative


 


Urine Nitrite    Negative


 


Urine Bilirubin    Negative


 


Urine Urobilinogen    2.0


 


Ur Leukocyte Esterase    Negative


 


Urine WBC (Auto)    1


 


Urine RBC (Auto)    2


 


Urine Casts (Auto)    6


 


U Epithel Cells (Auto)    0.8


 


Urine Bacteria (Auto)    0.2














  19





  21:00 06:05 06:05


 


WBC    7.1


 


RBC    4.48


 


Hgb    13.5


 


Hct    38.8


 


MCV    86.7


 


MCH    30.1


 


MCHC    34.8


 


RDW    14.1


 


Plt Count    234  D


 


MPV    8.0


 


Absolute Neuts (auto)    6.2


 


Neutrophils %    87.3 H


 


Lymphocytes %    4.8 L D


 


Monocytes %    6.8


 


Eosinophils %    0.9


 


Basophils %    0.2


 


Nucleated RBC %    0


 


PT with INR   


 


INR   


 


Anticoagulation Therapy   


 


Puncture Site   


 


ABG pH   


 


ABG pCO2 at Pt Temp   


 


ABG pO2 at Pt Temp   


 


ABG HCO3   


 


ABG O2 Sat (Measured)   


 


ABG O2 Content   


 


ABG Base Excess   


 


Italo Test   


 


O2 Delivery Device   


 


Oxygen Flow Rate   


 


Vent Mode   


 


Vent Rate   


 


Mechanical Rate   


 


Pressure Support Vent   


 


Sodium   134 L 


 


Potassium  3.3 L  2.9 L* 


 


Chloride   96 L 


 


Carbon Dioxide   32 


 


Anion Gap   5 L 


 


BUN   21.3 H 


 


Creatinine   1.1 


 


Est GFR (CKD-EPI)AfAm   72.07 


 


Est GFR (CKD-EPI)NonAf   62.19 


 


Random Glucose   100 


 


Uric Acid   


 


Calcium   9.1 


 


Magnesium   2.1 


 


Total Bilirubin   2.8 H 


 


Direct Bilirubin   1.6 H 


 


AST   26 


 


ALT   57 


 


Alkaline Phosphatase   375 H 


 


C-Reactive Protein   


 


Total Protein   6.8 


 


Albumin   3.0 L 


 


CA 19-9 Antigen   


 


Urine Color   


 


Urine Appearance   


 


Urine pH   


 


Ur Specific Gravity   


 


Urine Protein   


 


Urine Glucose (UA)   


 


Urine Ketones   


 


Urine Blood   


 


Urine Nitrite   


 


Urine Bilirubin   


 


Urine Urobilinogen   


 


Ur Leukocyte Esterase   


 


Urine WBC (Auto)   


 


Urine RBC (Auto)   


 


Urine Casts (Auto)   


 


U Epithel Cells (Auto)   


 


Urine Bacteria (Auto)   














  19





  06:05 06:05


 


WBC  


 


RBC  


 


Hgb  


 


Hct  


 


MCV  


 


MCH  


 


MCHC  


 


RDW  


 


Plt Count  


 


MPV  


 


Absolute Neuts (auto)  


 


Neutrophils %  


 


Lymphocytes %  


 


Monocytes %  


 


Eosinophils %  


 


Basophils %  


 


Nucleated RBC %  


 


PT with INR   13.50 H


 


INR   1.14 H


 


Anticoagulation Therapy  


 


Puncture Site  


 


ABG pH  


 


ABG pCO2 at Pt Temp  


 


ABG pO2 at Pt Temp  


 


ABG HCO3  


 


ABG O2 Sat (Measured)  


 


ABG O2 Content  


 


ABG Base Excess  


 


Italo Test  


 


O2 Delivery Device  


 


Oxygen Flow Rate  


 


Vent Mode  


 


Vent Rate  


 


Mechanical Rate  


 


Pressure Support Vent  


 


Sodium  


 


Potassium  


 


Chloride  


 


Carbon Dioxide  


 


Anion Gap  


 


BUN  


 


Creatinine  


 


Est GFR (CKD-EPI)AfAm  


 


Est GFR (CKD-EPI)NonAf  


 


Random Glucose  


 


Uric Acid  


 


Calcium  


 


Magnesium  


 


Total Bilirubin  


 


Direct Bilirubin  


 


AST  


 


ALT  


 


Alkaline Phosphatase  


 


C-Reactive Protein  7.3 H 


 


Total Protein  


 


Albumin  


 


CA 19-9 Antigen  


 


Urine Color  


 


Urine Appearance  


 


Urine pH  


 


Ur Specific Gravity  


 


Urine Protein  


 


Urine Glucose (UA)  


 


Urine Ketones  


 


Urine Blood  


 


Urine Nitrite  


 


Urine Bilirubin  


 


Urine Urobilinogen  


 


Ur Leukocyte Esterase  


 


Urine WBC (Auto)  


 


Urine RBC (Auto)  


 


Urine Casts (Auto)  


 


U Epithel Cells (Auto)  


 


Urine Bacteria (Auto)  








Active Medications











Generic Name Dose Route Start Last Admin





  Trade Name Freq  PRN Reason Stop Dose Admin


 


Acetaminophen  750 mg  19 17:07  19 18:16





  Ofirmev Injection -  IVPB   750 mg





  Q12H PRN   Administration





  FEVER   





     





     





     


 


Amlodipine Besylate  5 mg  19 10:00  19 09:10





  Norvasc -  PO   5 mg





  DAILY LARISA   Administration





     





     





     





     


 


Cyanocobalamin  1,000 mcg  19 10:00  19 15:39





  Vitamin B12 -  PO   1,000 mcg





  DAILY LARISA   Administration





     





     





     





     


 


Furosemide  40 mg  11/10/19 06:00  19 05:52





  Lasix -  PO   40 mg





  BID@0600,1400 LARISA   Administration





     





     





     





     


 


Potassium Chloride  10 meq in 100 mls @ 100 mls/hr  19 08:00  19 08:

04





  Potassium Chloride 10 Meq Premix Ivpb -  IVPB  19 10:59  100 mls/hr





  Q60M LARISA   Administration





     





     





     





     


 


Lactulose  20 gm  19 10:00  19 15:38





  Cephulac (Oral Use)  PO   20 gm





  DAILY LARISA   Administration





     





     





     





     


 


Lidocaine  1 patch  19 14:00  19 09:10





  Lidoderm Patch -  TP   1 patch





  DAILY LARISA   Administration





     





     





     





     


 


Melatonin  3 mg  19 22:00  19 22:40





  Melatonin  PO   Not Given





  HS LARISA   





     





     





     





     


 


Methyl Salicylate  1 applic  19 10:00  19 21:50





  Cm-Jarrell -  TP   1 applic





  BID LARISA   Administration





     





     





     





     


 


Metoprolol Succinate  25 mg  19 10:00  19 09:10





  Toprol Xl -  PO   25 mg





  DAILY LARISA   Administration





     





     





     





     


 


Miscellaneous  1 each  19 22:00  19 21:51





  Lidoderm Patch Removal  MC   1 each





  DAILY@2200 LARISA   Administration





     





     





     





     


 


Ondansetron HCl  4 mg  19 17:27  11/10/19 09:50





  Zofran Injection  IVPUSH   4 mg





  Q6H PRN   Administration





  NAUSEA AND/OR VOMITING   





     





     





     


 


Pantoprazole Sodium  40 mg  19 22:00  19 21:50





  Protonix -  PO   40 mg





  BID LARISA   Administration





     





     





     





     


 


Rosuvastatin Calcium  5 mg  19 22:00  19 21:50





  Crestor -  PO   5 mg





  HS LARISA   Administration





     





     





     





     


 


Valsartan  160 mg  19 10:00  19 09:10





  Diovan -  PO   160 mg





  DAILY LARISA   Administration





     





     





     





     











ASSESSMENT/PLAN:








Problem List





- Problems


(1) Fever of unknown origin


Assessment/Plan: 


spiked to 101 today, blood cultures pending.  mental status improving, he is 

eating more now and ambulating with PT


monitor fever curve, vitals signs.  lactic acid normal limits.


family asked for a 2nd ID opinion, Dr. Burgos following.  


Code(s): R50.9 - FEVER, UNSPECIFIED   





(2) Severe sepsis


Assessment/Plan: 


sepsis resolved, however, again with fevers of 101


fevers of unknown origin, workup ongoing.


may need liver biopsy per GI


discussed with ID, no further antibiotics at this time


Code(s): A41.9 - SEPSIS, UNSPECIFIED ORGANISM; R65.20 - SEVERE SEPSIS WITHOUT 

SEPTIC SHOCK   





(3) DIPTI (obstructive sleep apnea)


Assessment/Plan: 


take off cpap and replaced with bipap for low oxygen levels on abg.  mental 

status has improved.  


will have home cpap checked to assure that it is working properly prior to d/c  


Code(s): G47.33 - OBSTRUCTIVE SLEEP APNEA (ADULT) (PEDIATRIC)   





(4) HLD (hyperlipidemia)


Assessment/Plan: 


continue home meds


Code(s): E78.5 - HYPERLIPIDEMIA, UNSPECIFIED   





(5) HTN (hypertension)


Assessment/Plan: 


continue cardiac meds


Code(s): I10 - ESSENTIAL (PRIMARY) HYPERTENSION   





(6) S/P aortic valve replacement


Assessment/Plan: 


follows with cardiologist, Dr. Samuel


Code(s): Z95.2 - PRESENCE OF PROSTHETIC HEART VALVE   





(7) Afib


Assessment/Plan: 


 Rate controlled


c/w toprol


Eliquis held for possible invasive testing-last dose  @ 11am


c/w tele monitoring


Code(s): I48.91 - UNSPECIFIED ATRIAL FIBRILLATION   





(8) Abnormal liver enzymes


Assessment/Plan: 


lfts within normal limits


US with hepatomegaly-fatty liver vs hepatocellular 


US without mass 


avoid hepatotoxic agents


MRCP revealing no CBD stones, GB stone but no cholecystitis. 


hida scan showed acute lb.


CT A/P:Thickening of gallbladder with debridement, questionable cyctitis. Right 

rectus sheath hematoma 4x3x6-may be the palpable lesion that was felt earlier


will determine if  biopsy will be done 


Code(s): R74.8 - ABNORMAL LEVELS OF OTHER SERUM ENZYMES   





(9) Altered mental status


Assessment/Plan: 


confusion persists.  abg shows respiratory acidosis with hypoxemia.


stop all sedating agents such as ultram and seroquel


for brain mri today


neurology following


Code(s): R41.82 - ALTERED MENTAL STATUS, UNSPECIFIED   





(10) Leukopenia


Assessment/Plan: 


resolved


Code(s): D72.819 - DECREASED WHITE BLOOD CELL COUNT, UNSPECIFIED   





(11) Lymphadenopathy


Assessment/Plan: 


outpatient follow up with Dr. Alfaro 


Code(s): R59.1 - GENERALIZED ENLARGED LYMPH NODES   





(12) Mediastinal lymphadenopathy


Assessment/Plan: 


may need biopsy to rule to lymphoma.  heme/onc following.


Code(s): R59.0 - LOCALIZED ENLARGED LYMPH NODES   





(13) Thrombocytopenia


Assessment/Plan: 


resolved


Code(s): D69.6 - THROMBOCYTOPENIA, UNSPECIFIED   





(14) Toxic metabolic encephalopathy


Assessment/Plan: 


continues to have altered mentation, completed antibiotics per ID.


monitor mental status


brain mri negative


Code(s): G92 - TOXIC ENCEPHALOPATHY   





(15) Prophylactic measure


Assessment/Plan: 


fen


tolerating po


monitor electrolyles


low salt diet as tolerated





on eliquis 5 bid-on hold for possible invasive procedure, if no procedure 

planned, will restart





full code





Code(s): Z29.9 - ENCOUNTER FOR PROPHYLACTIC MEASURES, UNSPECIFIED   





(16) Petechial eruption


Assessment/Plan: 


petechial rash vs drug rash on right leg, thigh and right back.  


no wheezing


monitor rash and respiratory status


Code(s): R23.3 - SPONTANEOUS ECCHYMOSES   





Visit type





- Emergency Visit


Emergency Visit: Yes


ED Registration Date: 19


Care time: The patient presented to the Emergency Department on the above date 

and was hospitalized for further evaluation of their emergent condition.





- New Patient


This patient is new to me today: No





- Critical Care


Critical Care patient: No





- Discharge Referral


Referred to [facility] No

## 2019-11-13 NOTE — PN.GI
GI Progress Note


Subjective: 





GI NOte: Dr. Burgos's consult is appreciated. Had fever to 101 earlier 

today. Total bilirubin and alk phos remain elevated





- Objective


Vital Signs: 


 Vital Signs











Temperature  98.4 F   11/13/19 13:49


 


Pulse Rate  70   11/13/19 13:49


 


Respiratory Rate  18   11/13/19 13:49


 


Blood Pressure  111/62   11/13/19 13:49


 


O2 Sat by Pulse Oximetry (%)  97   11/13/19 12:35











Constitutional: Other (less confused tonight)


...Auscultate: Yes: Normoactive Bowel Sounds


...Palpate: Yes: Soft, Other (nontender)


Labs: 


 CBC, BMP





 11/13/19 06:05 





 11/13/19 06:05 





 INR, PTT











INR  1.14  (0.83-1.09)  H  11/13/19  06:05    


 


Fibrinogen  229.0 mg/dL (238-498)  L  11/08/19  08:15    














Assessment/Plan





Assessment:


- Fever and abnormal LFTs could reflect a lymphoma given the other 

lymphadenopathy. It could harbor another malignancy, vasculitis or evidence for 

infection.  I did discuss the possibility of an underlying GI or pancreatic 

malignancy but given his respiratory status anesthesia for elective endoscopies 

is too risky. 


- Personal h/o a colon adenoma and diverticulosis


-  stomach cancer





Plan:


--  Hantavirus Ab and Leptospira Ab pending





Problem List





- Problems


(1) Lymphadenopathy


Code(s): R59.1 - GENERALIZED ENLARGED LYMPH NODES   





(2) Abnormal liver enzymes


Code(s): R74.8 - ABNORMAL LEVELS OF OTHER SERUM ENZYMES   





(3) Gallstone


Code(s): K80.20 - CALCULUS OF GALLBLADDER W/O CHOLECYSTITIS W/O OBSTRUCTION   





(4) S/P aortic valve replacement


Code(s): Z95.2 - PRESENCE OF PROSTHETIC HEART VALVE   





(5) Colon adenoma


Code(s): D12.6 - BENIGN NEOPLASM OF COLON, UNSPECIFIED   





(6) Diverticulosis


Code(s): K57.90 - DVRTCLOS OF INTEST, PART UNSP, W/O PERF OR ABSCESS W/O BLEED 

  





(7) History of prostate cancer


Code(s): Z85.46 - PERSONAL HISTORY OF MALIGNANT NEOPLASM OF PROSTATE   





(8) Afib


Code(s): I48.91 - UNSPECIFIED ATRIAL FIBRILLATION   





(9) Altered mental status


Code(s): R41.82 - ALTERED MENTAL STATUS, UNSPECIFIED   





(10) Leukopenia


Code(s): D72.819 - DECREASED WHITE BLOOD CELL COUNT, UNSPECIFIED   





(11) Severe sepsis


Code(s): A41.9 - SEPSIS, UNSPECIFIED ORGANISM; R65.20 - SEVERE SEPSIS WITHOUT 

SEPTIC SHOCK   





(12) Thrombocytopenia


Code(s): D69.6 - THROMBOCYTOPENIA, UNSPECIFIED   





(13) HLD (hyperlipidemia)


Code(s): E78.5 - HYPERLIPIDEMIA, UNSPECIFIED   





(14) HTN (hypertension)


Code(s): I10 - ESSENTIAL (PRIMARY) HYPERTENSION   





(15) Family history of gastric cancer


Code(s): Z80.0 - FAMILY HISTORY OF MALIGNANT NEOPLASM OF DIGESTIVE ORGANS

## 2019-11-13 NOTE — CONS
INFECTIOUS DISEASE CONSULTATION

 

DATE OF CONSULTATION:

 

DATE OF DICTATION:  2019

 

REQUESTING PHYSICIAN:  The hospitalist service.

 

This is an 84-year-old man with a history of COPD and a bioprosthetic aortic valve

done about 10 years ago.  I am asked to see him on hospital day number 13.  He was

admitted on , with weakness, chills, and fevers that had started 2 days

previously.  He reported that on that prior Tuesday he had gone to work.  He works as

a , and he was turning a valve for a water pipe in a tunnel.  He became

soaked, and he pulled his back that day.  His wife reports after he arrived home he

was soaked.  The next day, he complained of worsening back pain, and that night, he

started having rigors and chills.  This persisted on Thursday, and Thursday night, he

was not able to go trick or treating with his grandkids because he was shivering. 

With that, he was brought to [facility].  Three weeks prior to that,

he had been in [place] with his grandchildren, and his 3-year-old grandchild, during

the [place] trip, had gotten ill.  He was admitted through the ER and actually

admitted to the ICU after admission.  He has been evaluated by Neurology, ID, GI, as

well as Hematology and Cardiology.  His hospital stay has been notable for leukopenia

and thrombocytopenia that he developed after admission, that have now resolved.  He

also had elevated transaminases and alkaline phosphatase and bilirubin.  The

transaminases have resolved.  He has continued to have an elevated alkaline

phosphatase and bilirubin.  He has had extensive imaging done since admission, which

has been notable for chest CT with mediastinal adenopathy.  He has had a lumbar

puncture with elevated protein, and he has had a HARRIETT that showed no vegetation. 

Multiple blood cultures are negative as well.  He was treated with cefepime from the

 to the  and doxycycline from the  to the , and since then, he has been

off antibiotics.  Today, he is more alert and conversant and has actually had an

appetite and ate.  He has no known drug allergies.  He has had no recent changes in

his medications.

 

MEDICATIONS:  As an outpatient include Crestor, Toprol-XL, furosemide, Benicar,

Eliquis, B12, and olmesartan.  None of these medications have been changed.

 

FAMILY HISTORY:  Notable for gastric cancer.  Father  of pneumonia.

 

PAST MEDICAL HISTORY:  He is hard of hearing.  He wears hearing aids.  He has a

history of atrial fibrillation, bioprosthetic aortic valve, aortic stenosis, LVH,

history of CHF, hypertension, hyperlipidemia, pulmonary hypertension, diverticulosis,

cholelithiasis, prostate cancer, status post radical prostatectomy.  He has had MRSA.

 He had a perianal abscess many years ago, and he has a history of osteoarthritis,

cervical radiculopathy, gout.

 

SURGICAL HISTORY:  Notable for laminectomy, umbilical hernia repair, radical

prostatectomy, aortic valve replacement in .  He has had a left neck lipoma

resection and a hemorrhoidectomy.

 

SOCIAL HISTORY:  He is a former beer drinker, has not had a drink in 3 months. 

Former smoker, quit in .  He lives with his spouse.  He owns a [employer] company.

 

REVIEW OF SYSTEMS:  He reports he had some nausea on admission, which is now gone. 

He never vomited.  He had no diarrhea or dysuria.  No chest pain or abdominal pain. 

His back pain has now markedly improved.

 

PHYSICAL EXAMINATION:

General:  He is an elderly man in no acute distress.

Vital Signs:  T-max of 101.1 rectally.  He has had intermittent fevers since his

admission on the .  His current temperature is 98.4 rectally.  Pulse is 70. 

Blood pressure is 111/62.  Respiratory rate of 18.

HEENT:  He is normocephalic.  His eyes are mildly icteric.  He has no thrush.  He has

good dentition.

Neck:  Supple.

Lungs:  Clear to auscultation.

Heart:  Regular rate and rhythm.

Abdomen:  Soft, nontender.

Extremities:  Without edema.

Skin:  He looks like he has a rash on the outer aspect of his right leg, that appears

to be resolving.  He has no edema.  He has no skin breakdown.

 

LABORATORY DATA:  White count is now 7.1, hemoglobin 13.5, platelets are 234.  On

, his white count was 1.8 with platelets of 61,000.  His INR is 1.14.  His

BUN is 21 and creatinine 1.1.  His liver tests:  His AST and ALT are now normal, 26

and 57, with a total bilirubin of 2.8 and an alkaline phosphatase of 375.  CRP is

7.3.  Urinalysis is unremarkable.  His LP was notable for 2 white cells and total

protein of 92.  His SANTOS is negative.  The Babesia serology is negative.  His smear

for Babesia is negative.  RPR is negative.  Lyme screen is negative.  West Nile, IgG,

IgM serology is negative.  Ehrlichia serology is negative.  Hepatitis serology is

negative.  He has a CMV IgG/IgM that are sent, that are both negative as well, and he

has a Leptospira IgM pending and anaplasma DNA.

 

In summary, this is an 84-year-old man with an FUO (fever of unknown origin) that

appears to be clinically improving in terms of his hematologic parameters. 

Differential diagnosis would include viral tick illness, consideration for

malignancy, a lymphoma also a possibility with nonspecific adenopathy and now with a

rash on his leg as well.  I would check EBV serology.  CMV serology is negative. 

Would check for dengue and HHV-6, HIV testing, Ehrlichia PCR.  Anaplasma PCR is

pending.  Would repeat a GGT and a CRP.  If further fevers persist, then

consideration for biopsy is reasonable.  Follow his fever curve.  This was discussed

with the patient, wife, and son at bedside.  I spoke with Dr. Laguna as well regarding

the patient's care.

 

 

FAWAD BLANC M.D.

 

[id number]

DD: 2019 19:41

DT: 2019 23:03

Job #:  [id number]
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
## 2019-11-14 VITALS — SYSTOLIC BLOOD PRESSURE: 139 MMHG | DIASTOLIC BLOOD PRESSURE: 66 MMHG

## 2019-11-14 VITALS — TEMPERATURE: 98.3 F

## 2019-11-14 VITALS — HEART RATE: 63 BPM

## 2019-11-14 LAB
ALBUMIN SERPL-MCNC: 2.6 G/DL (ref 3.4–5)
ALP SERPL-CCNC: 355 U/L (ref 45–117)
ALPHA1 GLOB MFR CSF ELPH: 6.1 % (ref 1.1–6.6)
ALPHA2 GLOB CSF ELPH-MCNC: 5.8 % (ref 3–12.6)
ALT SERPL-CCNC: 48 U/L (ref 13–61)
ANION GAP SERPL CALC-SCNC: 6 MMOL/L (ref 8–16)
AST SERPL-CCNC: 27 U/L (ref 15–37)
BASOPHILS # BLD: 0.5 % (ref 0–2)
BETA2 GLOB CSF ELPH-MCNC: 16.9 % (ref 7.3–17.9)
BILIRUB SERPL-MCNC: 2.2 MG/DL (ref 0.2–1)
BUN SERPL-MCNC: 27.7 MG/DL (ref 7–18)
CALCIUM SERPL-MCNC: 8.9 MG/DL (ref 8.5–10.1)
CHLORIDE SERPL-SCNC: 96 MMOL/L (ref 98–107)
CO2 SERPL-SCNC: 32 MMOL/L (ref 21–32)
CREAT SERPL-MCNC: 1.3 MG/DL (ref 0.55–1.3)
DEPRECATED RDW RBC AUTO: 13.7 % (ref 11.9–15.9)
EOSINOPHIL # BLD: 1.9 % (ref 0–4.5)
GAMMA GLOB CSF ELPH-MCNC: 13.6 % (ref 3–13)
GLUCOSE SERPL-MCNC: 102 MG/DL (ref 74–106)
HCT VFR BLD CALC: 35.9 % (ref 35.4–49)
HGB BLD-MCNC: 12.2 GM/DL (ref 11.7–16.9)
IGA IN CSF/IGA IN SERUM: 1.51 MG/DL (ref 0–0.64)
IGG CSF/SERPL: 12.6 MG/DL (ref 0–8.6)
IGM.MONOCLONAL [PRESENCE] IN CEREBRAL SPINAL FLUID BY IMMUNOFIXATION: 4.5 %
LYMPHOCYTES # BLD: 11.3 % (ref 8–40)
MAGNESIUM SERPL-MCNC: 2.5 MG/DL (ref 1.8–2.4)
MCH RBC QN AUTO: 29.7 PG (ref 25.7–33.7)
MCHC RBC AUTO-ENTMCNC: 33.9 G/DL (ref 32–35.9)
MCV RBC: 87.6 FL (ref 80–96)
MONOCYTES # BLD AUTO: 9.8 % (ref 3.8–10.2)
NEUTROPHILS # BLD: 76.5 % (ref 42.8–82.8)
PLATELET # BLD AUTO: 248 K/MM3 (ref 134–434)
PMV BLD: 7.9 FL (ref 7.5–11.1)
POTASSIUM SERPLBLD-SCNC: 3.3 MMOL/L (ref 3.5–5.1)
PREALB CSF ELPH-MCNC: 1.6 % (ref 2.2–7.1)
PROT SERPL-MCNC: 6 G/DL (ref 6.4–8.2)
RBC # BLD AUTO: 4.1 M/MM3 (ref 4–5.6)
SODIUM SERPL-SCNC: 134 MMOL/L (ref 136–145)
WBC # BLD AUTO: 6 K/MM3 (ref 4–10)

## 2019-11-14 RX ADMIN — PANTOPRAZOLE SODIUM SCH MG: 40 TABLET, DELAYED RELEASE ORAL at 10:06

## 2019-11-14 RX ADMIN — ACETAMINOPHEN PRN MG: 10 INJECTION, SOLUTION INTRAVENOUS at 02:10

## 2019-11-14 RX ADMIN — ANALGESIC BALM SCH APPLIC: 1.74; 4.06 OINTMENT TOPICAL at 10:10

## 2019-11-14 RX ADMIN — ACETAMINOPHEN PRN MG: 10 INJECTION, SOLUTION INTRAVENOUS at 14:46

## 2019-11-14 RX ADMIN — FUROSEMIDE SCH MG: 40 TABLET ORAL at 14:49

## 2019-11-14 RX ADMIN — AMLODIPINE BESYLATE SCH MG: 5 TABLET ORAL at 10:06

## 2019-11-14 RX ADMIN — FUROSEMIDE SCH MG: 40 TABLET ORAL at 06:13

## 2019-11-14 RX ADMIN — LIDOCAINE SCH PATCH: 50 PATCH TOPICAL at 10:06

## 2019-11-14 RX ADMIN — CYANOCOBALAMIN TAB 1000 MCG SCH MCG: 1000 TAB at 10:06

## 2019-11-14 RX ADMIN — LACTULOSE SCH GM: 20 SOLUTION ORAL at 10:05

## 2019-11-14 RX ADMIN — VALSARTAN SCH MG: 160 TABLET, FILM COATED ORAL at 10:05

## 2019-11-14 RX ADMIN — POTASSIUM CHLORIDE SCH MEQ: 1500 TABLET, EXTENDED RELEASE ORAL at 10:02

## 2019-11-14 NOTE — PN
Progress Note, Physician


History of Present Illness: 





doing well


still with low grade temp


mental status back to normal


looks comfortable


family in the room





- Current Medication List


Current Medications: 


Active Medications





Acetaminophen (Ofirmev Injection -)  750 mg IVPB Q12H PRN


   PRN Reason: FEVER


   Last Admin: 11/14/19 02:10 Dose:  750 mg


Amlodipine Besylate (Norvasc -)  5 mg PO DAILY [facility]


   Last Admin: 11/14/19 10:06 Dose:  5 mg


Cyanocobalamin (Vitamin B12 -)  1,000 mcg PO DAILY [facility]


   Last Admin: 11/14/19 10:06 Dose:  1,000 mcg


Furosemide (Lasix -)  40 mg PO BID@0600,1400 [facility]


   Last Admin: 11/14/19 06:13 Dose:  40 mg


Lactulose (Cephulac (Oral Use))  20 gm PO DAILY [facility]


   Last Admin: 11/14/19 10:05 Dose:  20 gm


Lidocaine (Lidoderm Patch -)  1 patch TP DAILY [facility]


   Last Admin: 11/14/19 10:06 Dose:  1 patch


Melatonin (Melatonin)  3 mg PO HS [facility]


   Last Admin: 11/13/19 21:26 Dose:  Not Given


Methyl Salicylate (Cm-Jarrell -)  1 applic TP BID [facility]


   Last Admin: 11/14/19 10:10 Dose:  1 applic


Metoprolol Succinate (Toprol Xl -)  25 mg PO DAILY [facility]


   Last Admin: 11/14/19 10:05 Dose:  25 mg


Miscellaneous (Lidoderm Patch Removal)  1 each MC DAILY@2200 [facility]


   Last Admin: 11/13/19 21:25 Dose:  1 each


Ondansetron HCl (Zofran Injection)  4 mg IVPUSH Q6H PRN


   PRN Reason: NAUSEA AND/OR VOMITING


   Last Admin: 11/10/19 09:50 Dose:  4 mg


Pantoprazole Sodium (Protonix -)  40 mg PO BID [facility]


   Last Admin: 11/14/19 10:06 Dose:  40 mg


Potassium Chloride (K-Dur -)  20 meq PO BID [facility]


   Last Admin: 11/14/19 10:02 Dose:  20 meq


Rosuvastatin Calcium (Crestor -)  5 mg PO HS [facility]


   Last Admin: 11/13/19 21:25 Dose:  5 mg


Valsartan (Diovan -)  160 mg PO DAILY [facility]


   Last Admin: 11/14/19 10:05 Dose:  160 mg











- Objective


Vital Signs: 


 Vital Signs











Temperature  98.5 F   11/14/19 09:16


 


Pulse Rate  63   11/14/19 09:16


 


Respiratory Rate  20   11/14/19 09:16


 


Blood Pressure  143/89   11/14/19 09:16


 


O2 Sat by Pulse Oximetry (%)  91 L  11/13/19 21:00











Constitutional: Yes: No Distress, Calm


Cardiovascular: Yes: S1, S2


Respiratory: Yes: Regular, CTA Bilaterally


Gastrointestinal: Yes: Normal Bowel Sounds, Soft


Musculoskeletal: Yes: WNL


Extremities: Yes: WNL


Integumentary: Yes: Other (lymph nodes in the groin)


Neurological: Yes: Alert, Oriented


Psychiatric: Yes: Alert, Oriented


Labs: 


 CBC, BMP





 11/14/19 06:07 





 11/14/19 06:07 





 INR, PTT











INR  1.14  (0.83-1.09)  H  11/13/19  06:05    


 


Fibrinogen  229.0 mg/dL (238-498)  L  11/08/19  08:15    














Assessment/Plan








Problem List





- Problems


(1) Severe sepsis


Code(s): A41.9 - SEPSIS, UNSPECIFIED ORGANISM; R65.20 - SEVERE SEPSIS WITHOUT 

SEPTIC SHOCK   





(2) DIPTI (obstructive sleep apnea)


Code(s): G47.33 - OBSTRUCTIVE SLEEP APNEA (ADULT) (PEDIATRIC)   





(3) HLD (hyperlipidemia)


Code(s): E78.5 - HYPERLIPIDEMIA, UNSPECIFIED   





(4) HTN (hypertension)


Code(s): I10 - ESSENTIAL (PRIMARY) HYPERTENSION   





(5) S/P aortic valve replacement 


Code(s): Z95.2 - PRESENCE OF PROSTHETIC HEART VALVE   





(6) Afib


Code(s): I48.91 - UNSPECIFIED ATRIAL FIBRILLATION   





plan


continue current mgmt


rest as per the team


nutrition


await for all the test


2nd opinion note noted


patient wants to be transferred to other hospital 


also  consider biopsy of the node which is planned if patient stays here

## 2019-11-14 NOTE — PN
Progress Note (short form)





- Note


Progress Note: 


 Neurology 





- Admission


Chief Complaint: Chills, Weakness


History of Present Illness: 


This is a 85 y/o man from home with a PMhx of HTN, HLD, s/p Prosthetic Aortic 

Valve (Bovine) who presented to the ED with his family for ataxia, chills, 

rigors, and weakness. Patient reported feeling well earlier in the day of 

admission but the patient's wife reported they were at the grandkid's house 

earlier where the patient had difficulty getting out of the car and was walking 

like "he lost all his energy." the patient's wife reports she later found the 

patient under a blanket and was shaking. The patient reported he had a rough 

day and felt "terrible." Denied any pain. The patient reports associated 

symptoms of shortness of breath. They called Dr. Samuel (cardiologist), who 

told them to follow up at the ER. Denies worsening shortness of breath, chest 

pain, abdominal pain, nausea, vomiting. Denies cough or congestion. The patient 

has been in the hospital for further medical evaluation and management and I 

was consulted 11/11 for evaluation and management of altered mental status. I 

had an extensive conversation with the hospitalist nurse practitioner, Gricel

, who provided background information regarding the patient who has been 

demonstrating confusion with spiking fevers to 102. Work up for underlying 

infection has been otherwise negative thus far with fever of unknown origin.  

Lumbar puncture was been completed and I reviewed CSF results which 

demonstrated 2Wbc and increased protein, normal glucose. MRI of the brain was 

completed  and patient seen at bedside with nurse practitioner  with whom I 

discussed the results and did not show any acute structural abnormalities.  He 

remains awake and alert does know that he is in the hospital along with month 

and year s well as the name of the president today.  ID second 

oopinionreviewedand considering  possible underlying malignancy, lymphoma as 

suggestive etiology considering hematologic abnormalities and  elevated liver 

function tests. GI note reviewed as well. Overall, mental status has improved 

with interventions and patient seems to be near baseline.





Active Medications





Acetaminophen (Ofirmev Injection -)  750 mg IVPB Q12H PRN


   PRN Reason: FEVER


   Last Admin: 11/14/19 02:10 Dose:  750 mg


Amlodipine Besylate (Norvasc -)  5 mg PO DAILY [facility]


   Last Admin: 11/13/19 09:18 Dose:  5 mg


Cyanocobalamin (Vitamin B12 -)  1,000 mcg PO DAILY [facility]


   Last Admin: 11/13/19 09:18 Dose:  1,000 mcg


Furosemide (Lasix -)  40 mg PO BID@0600,1400 [facility]


   Last Admin: 11/14/19 06:13 Dose:  40 mg


Lactulose (Cephulac (Oral Use))  20 gm PO DAILY [facility]


   Last Admin: 11/13/19 09:17 Dose:  20 gm


Lidocaine (Lidoderm Patch -)  1 patch TP DAILY [facility]


   Last Admin: 11/13/19 09:17 Dose:  1 patch


Melatonin (Melatonin)  3 mg PO HS [facility]


   Last Admin: 11/13/19 21:26 Dose:  Not Given


Methyl Salicylate (Cm-Jarrell -)  1 applic TP BID [facility]


   Last Admin: 11/13/19 21:25 Dose:  1 applic


Metoprolol Succinate (Toprol Xl -)  25 mg PO DAILY [facility]


   Last Admin: 11/13/19 09:17 Dose:  25 mg


Miscellaneous (Lidoderm Patch Removal)  1 each MC DAILY@2200 [facility]


   Last Admin: 11/13/19 21:25 Dose:  1 each


Ondansetron HCl (Zofran Injection)  4 mg IVPUSH Q6H PRN


   PRN Reason: NAUSEA AND/OR VOMITING


   Last Admin: 11/10/19 09:50 Dose:  4 mg


Pantoprazole Sodium (Protonix -)  40 mg PO BID [facility]


   Last Admin: 11/13/19 21:25 Dose:  40 mg


Potassium Chloride (K-Dur -)  20 meq PO BID [facility]


   Last Admin: 11/13/19 21:25 Dose:  20 meq


Potassium Chloride (K-Dur -)  40 meq PO ONCE ONE


   Stop: 11/14/19 09:09


Rosuvastatin Calcium (Crestor -)  5 mg PO HS [facility]


   Last Admin: 11/13/19 21:25 Dose:  5 mg


Valsartan (Diovan -)  160 mg PO DAILY [facility]


   Last Admin: 11/13/19 09:18 Dose:  160 mg





Physical Examination


Vital Signs: 


  


 Vital Signs











 Period  Temp  Pulse  Resp  BP Sys/Rosales  Pulse Ox


 


 Last 24 Hr  97.6 F-100.9 F  59-71  18-20  111-155/62-77  91-97








Constitutional: Yes: Well Nourished, No Distress, Calm


Eyes: Yes: WNL, Conjunctiva Clear, EOM Intact, PERRL


HENT: Yes: WNL, Atraumatic, Normocephalic


Neck: Yes: WNL, Supple, Trachea Midline


Cardiovascular: Yes: Other (click)


Respiratory: Yes: WNL, Regular, CTA Bilaterally


Gastrointestinal: Yes: WNL, Normal Bowel Sounds, Soft, Abdomen, Obese


...Rectal Exam: Yes: WNL


Renal/: Yes: WNL


Breast(s): Yes: WNL


Musculoskeletal: Yes: Muscle Weakness


Extremities: Yes: WNL


Edema: No


Peripheral Pulses WNL: Yes


Neurological: somnolent but arousable,  moves extremities grossly, able to tell 

me name of hospital as well as year but not name of the president, sensory 

intact to tactile stimulation


 


 CBCD











WBC  6.0 K/mm3 (4.0-10.0)   11/14/19  06:07    


 


RBC  4.10 M/mm3 (4.00-5.60)   11/14/19  06:07    


 


Hgb  12.2 GM/dL (11.7-16.9)   11/14/19  06:07    


 


Hct  35.9 % (35.4-49)   11/14/19  06:07    


 


MCV  87.6 fl (80-96)   11/14/19  06:07    


 


MCHC  33.9 g/dl (32.0-35.9)   11/14/19  06:07    


 


RDW  13.7 % (11.9-15.9)   11/14/19  06:07    


 


Plt Count  248 K/MM3 (134-434)   11/14/19  06:07    


 


MPV  7.9 fl (7.5-11.1)   11/14/19  06:07    








 CMP











Sodium  134 mmol/L (136-145)  L  11/14/19  06:07    


 


Potassium  3.3 mmol/L (3.5-5.1)  L  11/14/19  06:07    


 


Chloride  96 mmol/L ()  L  11/14/19  06:07    


 


Carbon Dioxide  32 mmol/L (21-32)   11/14/19  06:07    


 


Anion Gap  6 MMOL/L (8-16)  L  11/14/19  06:07    


 


BUN  27.7 mg/dL (7-18)  H  11/14/19  06:07    


 


Creatinine  1.3 mg/dL (0.55-1.3)   11/14/19  06:07    


 


Glucose  146 mg/dL (65-99)  H  11/09/19  06:00    


 


Random Glucose  102 mg/dL ()   11/14/19  06:07    


 


Calcium  8.9 mg/dL (8.5-10.1)   11/14/19  06:07    


 


Total Bilirubin  2.2 mg/dL (0.2-1)  H  11/14/19  06:07    


 


AST  27 U/L (15-37)   11/14/19  06:07    


 


ALT  48 U/L (13-61)   11/14/19  06:07    


 


Alkaline Phosphatase  355 U/L ()  H  11/14/19  06:07    


 


Total Protein  6.0 g/dl (6.4-8.2)  L  11/14/19  06:07    


 


Albumin  2.6 g/dl (3.4-5.0)  L  11/14/19  06:07    








 CARDIAC ENZYMES











Creatine Kinase  278 U/L ()   11/02/19  05:51    


 


Troponin I  0.05 ng/ml (0.00-0.05)   11/01/19  13:30    











Plan:


85 y/o man from home with a PMhx of HTN, HLD, s/p Prosthetic Aortic Valve (

Bovine) who presented to the ED with his family for ataxia, chills, rigors, and 

weakness. Patient reported feeling well earlier in the day of admission but the 

patient's wife reported they were at the grandkid's house earlier where the 

patient had difficulty getting out of the car and was walking like "he lost all 

his energy." the patient's wife reports she later found the patient under a 

blanket and was shaking. The patient reported he had a rough day and felt 

"terrible." Denied any pain. The patient reports associated symptoms of 

shortness of breath. They called Dr. Samuel (cardiologist), who told them to 

follow up at the ER. Denies worsening shortness of breath, chest pain, 

abdominal pain, nausea, vomiting. Denies cough or congestion. The patient has 

been in the hospital for further medical evaluation and management and I was 

consulted 11/11 for evaluation and management of altered mental status. I had 

an extensive conversation with the hospitalist nurse practitioner, Gricel, 

who provided background information regarding the patient who has been 

demonstrating confusion with spiking fevers to 102.  He was again somnolent 

her my encounter but arousable. CT head has been completed and advised to have 

MRI of the brain for further evaluation. Work up for underlying infection has 

been otherwise negative thus far.  Therefore, fever of unknown origin.  Lumbar 

puncture was been completed and I reviewed CSF results which demonstrated 2Wbc 

and increased protein, normal glucose. MRI of the brain was completed  and 

patient seen at bedside with nurse practitioner  with whom I discussed the 

results and did not show any acute structural abnormalities.  He remains awake 

and alert does know that he is in the hospital along with month and year s well 

as the name of the president today.  ID second oopinionreviewedand considering  

possible underlying malignancy, lymphoma as suggestive etiology considering 

hematologic abnormalities and  elevated liver function tests. GI note reviewed 

as well. Overall, mental status has improved with interventions and patient 

seems to be near baseline.  Maintain adequate hydration, monitor blood pressure

  and maintain normotensive range. Continue monitor for fevers, ID follow up 

suggested.  Continue hydration, GI follow up.  Continue reorientation and  

cognitive activity.

## 2019-11-14 NOTE — PN
Progress Note (short form)





- Note


Progress Note: 





Mr. Capmos was admitted with chills or rigors, cough and dyspnea  Known case 

of hypertension, hypertensive cardiovascular disease, severe left ventricular 

diastolic dysfunction, paroxysmal atrial fibrillation, chronic pulmonary disease

, status post  bioprosthetic aortic valve replacement and OSAS.





 Continues to be febrile and has had periods significant confusion. Etiology of 

FUO is still not determined. Petechail rash persists. LFTs and CBC results are 

improving. Appetite has also improved. spoke to wife and son regarding transfer 

to a tertiary care centerfor further evaluation of FUO and ID also is in favor 

of transfer.arrangements are being made.








84-year-old gentleman was in no acute distress, no pallor, cyanosis, clubbing 

or jaundice.


 


 Last Vital Signs











Temp Pulse Resp BP Pulse Ox


 


 98.3 F   63   18   139/66   96 


 


 11/14/19 15:33  11/14/19 13:30  11/14/19 13:30  11/14/19 13:30  11/14/19 10:00











NECK: Supple, no jugular venous distention, hepato jugular reflux was negative, 

carotids were 2+, no lymphadenopthy.


HEART: PMI was not localized, no heaves or thrills, heart sounds were distant, 

ejection systolic murmur grade 2/6 was heard at the second right intercostal 

space ending in early to mid systole.  No diastolic murmur or gallops were 

heard.


LUNGS: decreased breath sounds at both bases.  No extraneous sounds were heard.


Chest: Rash/ petechiae involving the posterior chest.


ABDOMEN: Soft, protuberant, nontender. No hepatosplenomegaly or palpable masses 

were felt.Small nontender mass vs lipoma RUQ.


EXTREMITIES: No calf tenderness or dependent edema, bilateral rash /petechae 

both legs pulses were equal except posterior tibial pulses were not palpable. 

Swelling of the left knee, nontender


 


 Laboratory Results - last 24 hr











  11/12/19 11/12/19 11/12/19





  06:55 13:04 14:00


 


WBC   


 


RBC   


 


Hgb   


 


Hct   


 


MCV   


 


MCH   


 


MCHC   


 


RDW   


 


Plt Count   


 


MPV   


 


Absolute Neuts (auto)   


 


Neutrophils %   


 


Lymphocytes %   


 


Monocytes %   


 


Eosinophils %   


 


Basophils %   


 


Nucleated RBC %   


 


PT with INR   


 


INR   


 


Anticoagulation Therapy   No Result Required. 


 


Puncture Site   No Result Required. 


 


ABG pH   7.52 H 


 


ABG pCO2 at Pt Temp   36.0 


 


ABG pO2 at Pt Temp   65.9 L 


 


ABG HCO3   29.5 H 


 


ABG O2 Sat (Measured)   93.9 L 


 


ABG O2 Content   17.5 


 


ABG Base Excess   6.8 H 


 


Italo Test   Positive 


 


O2 Delivery Device   No Result Required. 


 


Oxygen Flow Rate   Room air 


 


Vent Mode   No Result Required. 


 


Vent Rate   No Result Required. 


 


Mechanical Rate   No Result Required. 


 


Pressure Support Vent   No Result Required. 


 


Sodium   


 


Potassium    3.1 L


 


Chloride   


 


Carbon Dioxide   


 


Anion Gap   


 


BUN   


 


Creatinine   


 


Est GFR (CKD-EPI)AfAm   


 


Est GFR (CKD-EPI)NonAf   


 


Random Glucose   


 


Calcium   


 


Magnesium   


 


Total Bilirubin   


 


Direct Bilirubin   


 


AST   


 


ALT   


 


Alkaline Phosphatase   


 


C-Reactive Protein   


 


Total Protein   


 


Albumin   


 


CA 19-9 Antigen  34  


 


Urine Color   


 


Urine Appearance   


 


Urine pH   


 


Ur Specific Gravity   


 


Urine Protein   


 


Urine Glucose (UA)   


 


Urine Ketones   


 


Urine Blood   


 


Urine Nitrite   


 


Urine Bilirubin   


 


Urine Urobilinogen   


 


Ur Leukocyte Esterase   


 


Urine WBC (Auto)   


 


Urine RBC (Auto)   


 


Urine Casts (Auto)   


 


U Epithel Cells (Auto)   


 


Urine Bacteria (Auto)   














  11/12/19 11/12/19 11/13/19





  19:00 21:00 06:05


 


WBC   


 


RBC   


 


Hgb   


 


Hct   


 


MCV   


 


MCH   


 


MCHC   


 


RDW   


 


Plt Count   


 


MPV   


 


Absolute Neuts (auto)   


 


Neutrophils %   


 


Lymphocytes %   


 


Monocytes %   


 


Eosinophils %   


 


Basophils %   


 


Nucleated RBC %   


 


PT with INR   


 


INR   


 


Anticoagulation Therapy   


 


Puncture Site   


 


ABG pH   


 


ABG pCO2 at Pt Temp   


 


ABG pO2 at Pt Temp   


 


ABG HCO3   


 


ABG O2 Sat (Measured)   


 


ABG O2 Content   


 


ABG Base Excess   


 


Italo Test   


 


O2 Delivery Device   


 


Oxygen Flow Rate   


 


Vent Mode   


 


Vent Rate   


 


Mechanical Rate   


 


Pressure Support Vent   


 


Sodium    134 L


 


Potassium   3.3 L  2.9 L*


 


Chloride    96 L


 


Carbon Dioxide    32


 


Anion Gap    5 L


 


BUN    21.3 H


 


Creatinine    1.1


 


Est GFR (CKD-EPI)AfAm    72.07


 


Est GFR (CKD-EPI)NonAf    62.19


 


Random Glucose    100


 


Calcium    9.1


 


Magnesium    2.1


 


Total Bilirubin    2.8 H


 


Direct Bilirubin    1.6 H


 


AST    26


 


ALT    57


 


Alkaline Phosphatase    375 H


 


C-Reactive Protein   


 


Total Protein    6.8


 


Albumin    3.0 L


 


CA 19-9 Antigen   


 


Urine Color  Dk yellow  


 


Urine Appearance  Clear  


 


Urine pH  5.5  


 


Ur Specific Gravity  1.015  


 


Urine Protein  1+ H  


 


Urine Glucose (UA)  Negative  


 


Urine Ketones  Negative  


 


Urine Blood  Negative  


 


Urine Nitrite  Negative  


 


Urine Bilirubin  Negative  


 


Urine Urobilinogen  2.0  


 


Ur Leukocyte Esterase  Negative  


 


Urine WBC (Auto)  1  


 


Urine RBC (Auto)  2  


 


Urine Casts (Auto)  6  


 


U Epithel Cells (Auto)  0.8  


 


Urine Bacteria (Auto)  0.2  














  11/13/19 11/13/19 11/13/19





  06:05 06:05 06:05


 


WBC  7.1  


 


RBC  4.48  


 


Hgb  13.5  


 


Hct  38.8  


 


MCV  86.7  


 


MCH  30.1  


 


MCHC  34.8  


 


RDW  14.1  


 


Plt Count  234  D  


 


MPV  8.0  


 


Absolute Neuts (auto)  6.2  


 


Neutrophils %  87.3 H  


 


Lymphocytes %  4.8 L D  


 


Monocytes %  6.8  


 


Eosinophils %  0.9  


 


Basophils %  0.2  


 


Nucleated RBC %  0  


 


PT with INR    13.50 H


 


INR    1.14 H


 


Anticoagulation Therapy   


 


Puncture Site   


 


ABG pH   


 


ABG pCO2 at Pt Temp   


 


ABG pO2 at Pt Temp   


 


ABG HCO3   


 


ABG O2 Sat (Measured)   


 


ABG O2 Content   


 


ABG Base Excess   


 


Italo Test   


 


O2 Delivery Device   


 


Oxygen Flow Rate   


 


Vent Mode   


 


Vent Rate   


 


Mechanical Rate   


 


Pressure Support Vent   


 


Sodium   


 


Potassium   


 


Chloride   


 


Carbon Dioxide   


 


Anion Gap   


 


BUN   


 


Creatinine   


 


Est GFR (CKD-EPI)AfAm   


 


Est GFR (CKD-EPI)NonAf   


 


Random Glucose   


 


Calcium   


 


Magnesium   


 


Total Bilirubin   


 


Direct Bilirubin   


 


AST   


 


ALT   


 


Alkaline Phosphatase   


 


C-Reactive Protein   7.3 H 


 


Total Protein   


 


Albumin   


 


CA 19-9 Antigen   


 


Urine Color   


 


Urine Appearance   


 


Urine pH   


 


Ur Specific Gravity   


 


Urine Protein   


 


Urine Glucose (UA)   


 


Urine Ketones   


 


Urine Blood   


 


Urine Nitrite   


 


Urine Bilirubin   


 


Urine Urobilinogen   


 


Ur Leukocyte Esterase   


 


Urine WBC (Auto)   


 


Urine RBC (Auto)   


 


Urine Casts (Auto)   


 


U Epithel Cells (Auto)   


 


Urine Bacteria (Auto)   











 Impression:





1. FUO, associated with abnormal LFT, new rash vs.petechaie,thrombocytopenia 


a). Possibility of reckettial disease needs exclusion.


b). Lymphoma.


c). Malignancy.


2. Hypertension, hypertensive cardiovascular disease.


3. Severe left ventricular diastolic dysfunction.


4. Chronic obstructive pulmonary disease.


5. Status post bioprosthetic aortic valve replacement.


6. Mental confusion, etiology to be determined.


7. Hypoxmia.


8. Hypokalemia, etiology to be determined





Recommendations:


1. O2.


2. Resume A/C ASAP now that platelet, have normalised unless a biopsy is being 

consideredin the day or two.


3. Lymph node biopsy ? right groin.


4. Awaiting ID second opinion.


5. Correction of K+ in progress.


6. Check urine lytes.


7.  Transfer to tertiary care center is being planned.











Prognosis: Critical.

## 2019-11-14 NOTE — PN
Progress Note (short form)





- Note


Progress Note: 


Tolerating NIPPV support.  Family at the bedside. 


Periods of confusions and still with intermittent fever. 


Being transferred to [facility]. 








 Intake & Output











 11/11/19 11/12/19 11/13/19 11/14/19





 23:59 23:59 23:59 23:59


 


Intake Total 120 1140 100 240


 


Output Total  


 


Balance 120 765 -300 -960


 


Weight 208 lb 9.6 oz 204 lb 204 lb 4 oz 205 lb 3.2 oz











 Last Vital Signs











Temp Pulse Resp BP Pulse Ox


 


 98.5 F   63   20   143/89   96 


 


 11/14/19 09:16  11/14/19 09:16  11/14/19 09:16  11/14/19 09:16  11/14/19 10:00








Active Medications





Acetaminophen (Ofirmev Injection -)  750 mg IVPB Q12H PRN


   PRN Reason: FEVER


   Last Admin: 11/14/19 02:10 Dose:  750 mg


Amlodipine Besylate (Norvasc -)  5 mg PO DAILY [facility]


   Last Admin: 11/14/19 10:06 Dose:  5 mg


Cyanocobalamin (Vitamin B12 -)  1,000 mcg PO DAILY [facility]


   Last Admin: 11/14/19 10:06 Dose:  1,000 mcg


Furosemide (Lasix -)  40 mg PO BID@0600,1400 [facility]


   Last Admin: 11/14/19 06:13 Dose:  40 mg


Lactulose (Cephulac (Oral Use))  20 gm PO DAILY [facility]


   Last Admin: 11/14/19 10:05 Dose:  20 gm


Lidocaine (Lidoderm Patch -)  1 patch TP DAILY [facility]


   Last Admin: 11/14/19 10:06 Dose:  1 patch


Melatonin (Melatonin)  3 mg PO HS [facility]


   Last Admin: 11/13/19 21:26 Dose:  Not Given


Methyl Salicylate (Cm-Jarrell -)  1 applic TP BID [facility]


   Last Admin: 11/14/19 10:10 Dose:  1 applic


Metoprolol Succinate (Toprol Xl -)  25 mg PO DAILY [facility]


   Last Admin: 11/14/19 10:05 Dose:  25 mg


Miscellaneous (Lidoderm Patch Removal)  1 each MC DAILY@2200 [facility]


   Last Admin: 11/13/19 21:25 Dose:  1 each


Ondansetron HCl (Zofran Injection)  4 mg IVPUSH Q6H PRN


   PRN Reason: NAUSEA AND/OR VOMITING


   Last Admin: 11/10/19 09:50 Dose:  4 mg


Pantoprazole Sodium (Protonix -)  40 mg PO BID [facility]


   Last Admin: 11/14/19 10:06 Dose:  40 mg


Potassium Chloride (K-Dur -)  20 meq PO BID [facility]


   Last Admin: 11/14/19 10:02 Dose:  20 meq


Rosuvastatin Calcium (Crestor -)  5 mg PO HS [facility]


   Last Admin: 11/13/19 21:25 Dose:  5 mg


Valsartan (Diovan -)  160 mg PO DAILY [facility]


   Last Admin: 11/14/19 10:05 Dose:  160 mg








Constitutional: Yes: NAD 


Eyes: Yes: WNL


HENT: Yes: WNL


Neck: Yes: WNL


Cardiovascular: Yes: Pulse Irregular, S1, S2


Respiratory: Yes: Diminished


Gastrointestinal: Yes: Normal Bowel Sounds, Soft


Extremities: Yes: WNL


Edema: No


Labs: 


 Laboratory Results - last 24 hr











  11/12/19 11/12/19 11/13/19





  06:55 06:55 06:05


 


WBC   


 


RBC   


 


Hgb   


 


Hct   


 


MCV   


 


MCH   


 


MCHC   


 


RDW   


 


Plt Count   


 


MPV   


 


Absolute Neuts (auto)   


 


Neutrophils %   


 


Lymphocytes %   


 


Monocytes %   


 


Eosinophils %   


 


Basophils %   


 


Nucleated RBC %   


 


Sodium   


 


Potassium   


 


Chloride   


 


Carbon Dioxide   


 


Anion Gap   


 


BUN   


 


Creatinine   


 


Est GFR (CKD-EPI)AfAm   


 


Est GFR (CKD-EPI)NonAf   


 


Random Glucose   


 


Lactic Acid   


 


Calcium   


 


Magnesium   


 


Total Bilirubin   


 


GGT   


 


AST   


 


ALT   


 


Alkaline Phosphatase   


 


LD Total   270 H 


 


Total Protein   


 


Total Protein (PEP)  5.8 L  


 


Albumin   


 


Albumin (PEP)  2.6 L  


 


Globulin  3.2  


 


Albumin/Globulin Ratio  0.8  


 


Beta Globulins  0.7  


 


LDL 1 Fraction   29.0 


 


LDL 2 Fraction   34.0 


 


LDL 3 Fraction   20.0 


 


LDL 4 Fraction   8.0 


 


LDL 5 Fraction   9.0 


 


Carcinoembryonic Ag   


 


EPHRAIM M-Naresh    


 


Hantavirus IgG Ab    Cancelled


 


Hantavirus IgM Ab    Cancelled


 


HIV 1&2 Antibody Screen   


 


HIV P24 Antigen   














  11/13/19 11/13/19 11/13/19





  06:05 12:40 20:15


 


WBC   


 


RBC   


 


Hgb   


 


Hct   


 


MCV   


 


MCH   


 


MCHC   


 


RDW   


 


Plt Count   


 


MPV   


 


Absolute Neuts (auto)   


 


Neutrophils %   


 


Lymphocytes %   


 


Monocytes %   


 


Eosinophils %   


 


Basophils %   


 


Nucleated RBC %   


 


Sodium   


 


Potassium    3.5


 


Chloride   


 


Carbon Dioxide   


 


Anion Gap   


 


BUN   


 


Creatinine   


 


Est GFR (CKD-EPI)AfAm   


 


Est GFR (CKD-EPI)NonAf   


 


Random Glucose   


 


Lactic Acid   1.7 


 


Calcium   


 


Magnesium   


 


Total Bilirubin   


 


GGT   


 


AST   


 


ALT   


 


Alkaline Phosphatase   


 


LD Total   


 


Total Protein   


 


Total Protein (PEP)   


 


Albumin   


 


Albumin (PEP)   


 


Globulin   


 


Albumin/Globulin Ratio   


 


Beta Globulins   


 


LDL 1 Fraction   


 


LDL 2 Fraction   


 


LDL 3 Fraction   


 


LDL 4 Fraction   


 


LDL 5 Fraction   


 


Carcinoembryonic Ag  1.8  


 


EPHRAIM M-Naresh   


 


Hantavirus IgG Ab   


 


Hantavirus IgM Ab   


 


HIV 1&2 Antibody Screen   


 


HIV P24 Antigen   














  11/14/19 11/14/19 11/14/19





  06:07 06:07 06:07


 


WBC  6.0  


 


RBC  4.10  


 


Hgb  12.2  


 


Hct  35.9  


 


MCV  87.6  


 


MCH  29.7  


 


MCHC  33.9  


 


RDW  13.7  


 


Plt Count  248  


 


MPV  7.9  


 


Absolute Neuts (auto)  4.6  


 


Neutrophils %  76.5  


 


Lymphocytes %  11.3  D  


 


Monocytes %  9.8  


 


Eosinophils %  1.9  D  


 


Basophils %  0.5  


 


Nucleated RBC %  0  


 


Sodium   134 L 


 


Potassium   3.3 L 


 


Chloride   96 L 


 


Carbon Dioxide   32 


 


Anion Gap   6 L 


 


BUN   27.7 H 


 


Creatinine   1.3 


 


Est GFR (CKD-EPI)AfAm   58.89 


 


Est GFR (CKD-EPI)NonAf   50.81 


 


Random Glucose   102 


 


Lactic Acid   


 


Calcium   8.9 


 


Magnesium   2.5 H 


 


Total Bilirubin   2.2 H 


 


GGT    388 H


 


AST   27 


 


ALT   48 


 


Alkaline Phosphatase   355 H 


 


LD Total   


 


Total Protein   6.0 L 


 


Total Protein (PEP)   


 


Albumin   2.6 L 


 


Albumin (PEP)   


 


Globulin   


 


Albumin/Globulin Ratio   


 


Beta Globulins   


 


LDL 1 Fraction   


 


LDL 2 Fraction   


 


LDL 3 Fraction   


 


LDL 4 Fraction   


 


LDL 5 Fraction   


 


Carcinoembryonic Ag   


 


EPHRAIM M-Naresh   


 


Hantavirus IgG Ab   


 


Hantavirus IgM Ab   


 


HIV 1&2 Antibody Screen   


 


HIV P24 Antigen   














  11/14/19





  06:07


 


WBC 


 


RBC 


 


Hgb 


 


Hct 


 


MCV 


 


MCH 


 


MCHC 


 


RDW 


 


Plt Count 


 


MPV 


 


Absolute Neuts (auto) 


 


Neutrophils % 


 


Lymphocytes % 


 


Monocytes % 


 


Eosinophils % 


 


Basophils % 


 


Nucleated RBC % 


 


Sodium 


 


Potassium 


 


Chloride 


 


Carbon Dioxide 


 


Anion Gap 


 


BUN 


 


Creatinine 


 


Est GFR (CKD-EPI)AfAm 


 


Est GFR (CKD-EPI)NonAf 


 


Random Glucose 


 


Lactic Acid 


 


Calcium 


 


Magnesium 


 


Total Bilirubin 


 


GGT 


 


AST 


 


ALT 


 


Alkaline Phosphatase 


 


LD Total 


 


Total Protein 


 


Total Protein (PEP) 


 


Albumin 


 


Albumin (PEP) 


 


Globulin 


 


Albumin/Globulin Ratio 


 


Beta Globulins 


 


LDL 1 Fraction 


 


LDL 2 Fraction 


 


LDL 3 Fraction 


 


LDL 4 Fraction 


 


LDL 5 Fraction 


 


Carcinoembryonic Ag 


 


EPHRAIM M-Naresh 


 


Hantavirus IgG Ab 


 


Hantavirus IgM Ab 


 


HIV 1&2 Antibody Screen  Negative


 


HIV P24 Antigen  Negative

















Problem List





- Problems


(1) Altered mental status


Code(s): R41.82 - ALTERED MENTAL STATUS, UNSPECIFIED   





(2) Abnormal liver enzymes


Code(s): R74.8 - ABNORMAL LEVELS OF OTHER SERUM ENZYMES   





(3) Afib


Code(s): I48.91 - UNSPECIFIED ATRIAL FIBRILLATION   





(4) Leukopenia


Code(s): D72.819 - DECREASED WHITE BLOOD CELL COUNT, UNSPECIFIED   





(5) DIPTI (obstructive sleep apnea)


Code(s): G47.33 - OBSTRUCTIVE SLEEP APNEA (ADULT) (PEDIATRIC)   





(6) Thrombocytopenia


Code(s): D69.6 - THROMBOCYTOPENIA, UNSPECIFIED   





(7) Weakness


Code(s): R53.1 - WEAKNESS   





(8) HLD (hyperlipidemia)


Code(s): E78.5 - HYPERLIPIDEMIA, UNSPECIFIED   





(9) HTN (hypertension)


Code(s): I10 - ESSENTIAL (PRIMARY) HYPERTENSION   





(10) S/P aortic valve replacement


Code(s): Z95.2 - PRESENCE OF PROSTHETIC HEART VALVE   





Assessment/Plan


Fever of unknown origin with altered mental status improved. HARRIETT: No 

Endocarditis 


Toxic Metabolic Encephalopathy 


Low clinical suspicion of Meningitis 


Sepsis


Leukopenia


AF on chronic AC


AS s/p porcine valve 2009


HTN


CHF


DIPTI


COPD


CKD


ELEVATED LFTS


MEDIASTINAL ADENOPATHY





PLAN:


-Lasix


-BD TX PRN 


-CPAP at night and PRN 


-Rate control


-Patient being transferred to [facility] for further workup.





Dr Becerra

## 2019-11-14 NOTE — PN
Physical Exam: 


SUBJECTIVE: Patient seen and examined at the bedside.  eating breakfast.  

answering questions appropriately.   denies chest pain or shortness of breath.  

slept overnight for a few hours per nursing note.








OBJECTIVE:


improvement of petecial rash/vs drug rash? on right back-pink raised, rash 

extends from right posterior leg to right thigh and covers most of his right 

back.  non itchy. 





Patient is an 84 year old male with a significant past medical history of 

hypertension, hyperlipidemia, prosthetic aortic valve replacement (bovine). 

afib (on eliquis).   He presented to the [facility] ED with ataxia, chills, rigors and 

weakness.  Patient was worked up for persistent fevers during hospital stay, no 

def source found.  However, malignancy will need to be excluded and may need 

liver biopsy if patient's family agrees.





K 3.3, repleted with potassium chl 40mg x 1, then on maintenence of potassium 

20meq bid


hold all sedating medications


will have SW address home cpap machine with company to assure that is is 

working properly





 Vital Signs











 Period  Temp  Pulse  Resp  BP Sys/Rosales  Pulse Ox


 


 Last 24 Hr  97.6 F-101.1 F  59-72  18-20  111-155/53-77  91-97








GENERAL: The patient is awake, alert, and fully oriented, forgetful at times


HEAD: Normal with no signs of trauma.


EYES: PERRL, extraocular movements intact, sclera anicteric, conjunctiva clear. 

No ptosis. 


ENT: Ears normal, nares patent, oropharynx clear without exudates, moist mucous 

membranes.


NECK: Trachea midline, full range of motion, supple. 


LUNGS: Breath sounds equal, clear to auscultation bilaterally, no wheezes 


HEART: Regular rate and rhythm @ 64


ABDOMEN: Soft, nontender, nondistended, normoactive bowel sounds, no guarding


EXTREMITIES: 2+ pulses, warm, well-perfused, no edema. 


NEUROLOGICAL:  Normal speech, gait not observed. can stand, will order PT


PSYCH: Normal mood, normal affect.


SKIN: raised pink diffused rash that extends from right posterior leg>right 

thigh>right upper back - improved





 Laboratory Results - last 24 hr











  11/12/19 11/12/19 11/13/19





  06:55 06:55 06:05


 


WBC   


 


RBC   


 


Hgb   


 


Hct   


 


MCV   


 


MCH   


 


MCHC   


 


RDW   


 


Plt Count   


 


MPV   


 


Absolute Neuts (auto)   


 


Neutrophils %   


 


Lymphocytes %   


 


Monocytes %   


 


Eosinophils %   


 


Basophils %   


 


Nucleated RBC %   


 


Sodium   


 


Potassium   


 


Chloride   


 


Carbon Dioxide   


 


Anion Gap   


 


BUN   


 


Creatinine   


 


Est GFR (CKD-EPI)AfAm   


 


Est GFR (CKD-EPI)NonAf   


 


Random Glucose   


 


Lactic Acid   


 


Calcium   


 


Magnesium   


 


Total Bilirubin   


 


GGT   


 


AST   


 


ALT   


 


Alkaline Phosphatase   


 


LD Total   270 H 


 


Total Protein   


 


Total Protein (PEP)  5.8 L  


 


Albumin   


 


Albumin (PEP)  2.6 L  


 


Globulin  3.2  


 


Albumin/Globulin Ratio  0.8  


 


Beta Globulins  0.7  


 


LDL 1 Fraction   29.0 


 


LDL 2 Fraction   34.0 


 


LDL 3 Fraction   20.0 


 


LDL 4 Fraction   8.0 


 


LDL 5 Fraction   9.0 


 


Carcinoembryonic Ag   


 


EPHRAIM M-Naresh    


 


Hantavirus IgG Ab    Cancelled


 


Hantavirus IgM Ab    Cancelled














  11/13/19 11/13/19 11/13/19





  06:05 12:40 20:15


 


WBC   


 


RBC   


 


Hgb   


 


Hct   


 


MCV   


 


MCH   


 


MCHC   


 


RDW   


 


Plt Count   


 


MPV   


 


Absolute Neuts (auto)   


 


Neutrophils %   


 


Lymphocytes %   


 


Monocytes %   


 


Eosinophils %   


 


Basophils %   


 


Nucleated RBC %   


 


Sodium   


 


Potassium    3.5


 


Chloride   


 


Carbon Dioxide   


 


Anion Gap   


 


BUN   


 


Creatinine   


 


Est GFR (CKD-EPI)AfAm   


 


Est GFR (CKD-EPI)NonAf   


 


Random Glucose   


 


Lactic Acid   1.7 


 


Calcium   


 


Magnesium   


 


Total Bilirubin   


 


GGT   


 


AST   


 


ALT   


 


Alkaline Phosphatase   


 


LD Total   


 


Total Protein   


 


Total Protein (PEP)   


 


Albumin   


 


Albumin (PEP)   


 


Globulin   


 


Albumin/Globulin Ratio   


 


Beta Globulins   


 


LDL 1 Fraction   


 


LDL 2 Fraction   


 


LDL 3 Fraction   


 


LDL 4 Fraction   


 


LDL 5 Fraction   


 


Carcinoembryonic Ag  1.8  


 


EPHRAIM M-Naresh   


 


Hantavirus IgG Ab   


 


Hantavirus IgM Ab   














  11/14/19 11/14/19 11/14/19





  06:07 06:07 06:07


 


WBC  6.0  


 


RBC  4.10  


 


Hgb  12.2  


 


Hct  35.9  


 


MCV  87.6  


 


MCH  29.7  


 


MCHC  33.9  


 


RDW  13.7  


 


Plt Count  248  


 


MPV  7.9  


 


Absolute Neuts (auto)  4.6  


 


Neutrophils %  76.5  


 


Lymphocytes %  11.3  D  


 


Monocytes %  9.8  


 


Eosinophils %  1.9  D  


 


Basophils %  0.5  


 


Nucleated RBC %  0  


 


Sodium   134 L 


 


Potassium   3.3 L 


 


Chloride   96 L 


 


Carbon Dioxide   32 


 


Anion Gap   6 L 


 


BUN   27.7 H 


 


Creatinine   1.3 


 


Est GFR (CKD-EPI)AfAm   58.89 


 


Est GFR (CKD-EPI)NonAf   50.81 


 


Random Glucose   102 


 


Lactic Acid   


 


Calcium   8.9 


 


Magnesium   2.5 H 


 


Total Bilirubin   2.2 H 


 


GGT    388 H


 


AST   27 


 


ALT   48 


 


Alkaline Phosphatase   355 H 


 


LD Total   


 


Total Protein   6.0 L 


 


Total Protein (PEP)   


 


Albumin   2.6 L 


 


Albumin (PEP)   


 


Globulin   


 


Albumin/Globulin Ratio   


 


Beta Globulins   


 


LDL 1 Fraction   


 


LDL 2 Fraction   


 


LDL 3 Fraction   


 


LDL 4 Fraction   


 


LDL 5 Fraction   


 


Carcinoembryonic Ag   


 


EPHRAIM M-Naresh   


 


Hantavirus IgG Ab   


 


Hantavirus IgM Ab   








Active Medications











Generic Name Dose Route Start Last Admin





  Trade Name Freq  PRN Reason Stop Dose Admin


 


Acetaminophen  750 mg  11/09/19 17:07  11/14/19 02:10





  Ofirmev Injection -  IVPB   750 mg





  Q12H PRN   Administration





  FEVER   





     





     





     


 


Amlodipine Besylate  5 mg  11/05/19 10:00  11/13/19 09:18





  Norvasc -  PO   5 mg





  DAILY LARISA   Administration





     





     





     





     


 


Cyanocobalamin  1,000 mcg  11/05/19 10:00  11/13/19 09:18





  Vitamin B12 -  PO   1,000 mcg





  DAILY LARISA   Administration





     





     





     





     


 


Furosemide  40 mg  11/10/19 06:00  11/14/19 06:13





  Lasix -  PO   40 mg





  BID@0600,1400 LARISA   Administration





     





     





     





     


 


Lactulose  20 gm  11/12/19 10:00  11/13/19 09:17





  Cephulac (Oral Use)  PO   20 gm





  DAILY LARISA   Administration





     





     





     





     


 


Lidocaine  1 patch  11/07/19 14:00  11/13/19 09:17





  Lidoderm Patch -  TP   1 patch





  DAILY LARISA   Administration





     





     





     





     


 


Melatonin  3 mg  11/07/19 22:00  11/13/19 21:26





  Melatonin  PO   Not Given





  HS LARISA   





     





     





     





     


 


Methyl Salicylate  1 applic  11/07/19 10:00  11/13/19 21:25





  Cm-Jarrell -  TP   1 applic





  BID LARISA   Administration





     





     





     





     


 


Metoprolol Succinate  25 mg  11/05/19 10:00  11/13/19 09:17





  Toprol Xl -  PO   25 mg





  DAILY LARISA   Administration





     





     





     





     


 


Miscellaneous  1 each  11/07/19 22:00  11/13/19 21:25





  Lidoderm Patch Removal  MC   1 each





  DAILY@2200 LARISA   Administration





     





     





     





     


 


Ondansetron HCl  4 mg  11/07/19 17:27  11/10/19 09:50





  Zofran Injection  IVPUSH   4 mg





  Q6H PRN   Administration





  NAUSEA AND/OR VOMITING   





     





     





     


 


Pantoprazole Sodium  40 mg  11/05/19 22:00  11/13/19 21:25





  Protonix -  PO   40 mg





  BID LARISA   Administration





     





     





     





     


 


Potassium Chloride  20 meq  11/13/19 22:00  11/13/19 21:25





  K-Dur -  PO   20 meq





  BID LARISA   Administration





     





     





     





     


 


Rosuvastatin Calcium  5 mg  11/04/19 22:00  11/13/19 21:25





  Crestor -  PO   5 mg





  HS LARISA   Administration





     





     





     





     


 


Valsartan  160 mg  11/05/19 10:00  11/13/19 09:18





  Diovan -  PO   160 mg





  DAILY LARISA   Administration





     





     





     





     











ASSESSMENT/PLAN:








Problem List





- Problems


(1) Fever of unknown origin


Assessment/Plan: 


t max 100.9 overnight at 2a.m.


negative blood/urine culture


mental status improved. he is eating more now and ambulating with PT


monitor fever curve, vitals signs.  lactic acid normal limits.


family asked for a 2nd ID opinion, Dr. Burgos following, notes appreciated  


Code(s): R50.9 - FEVER, UNSPECIFIED   





(2) Severe sepsis


Assessment/Plan: 


sepsis resolved, however, again with fevers of 100.9 tmax


may need liver biopsy per GI


discussed with ID, no further antibiotics at this time


Code(s): A41.9 - SEPSIS, UNSPECIFIED ORGANISM; R65.20 - SEVERE SEPSIS WITHOUT 

SEPTIC SHOCK   





(3) DIPTI (obstructive sleep apnea)


Assessment/Plan: 


taken off home cpap and replaced with bipap for low oxygen levels on abg.  

mental status has improved.  


will have home cpap checked to assure that it is working properly prior to d/c  


Code(s): G47.33 - OBSTRUCTIVE SLEEP APNEA (ADULT) (PEDIATRIC)   





(4) HLD (hyperlipidemia)


Assessment/Plan: 


continue home meds


Code(s): E78.5 - HYPERLIPIDEMIA, UNSPECIFIED   





(5) HTN (hypertension)


Assessment/Plan: 


continue cardiac meds, bp stable


Code(s): I10 - ESSENTIAL (PRIMARY) HYPERTENSION   





(6) S/P aortic valve replacement


Assessment/Plan: 


follows with cardiologist, Dr. Samuel


Code(s): Z95.2 - PRESENCE OF PROSTHETIC HEART VALVE   





(7) Afib


Assessment/Plan: 


 Rate controlled


c/w toprol


Eliquis held for possible invasive testing-last dose 11/9 @ 11am, if no testing 

to be done will restart eliquis.


c/w tele monitoring


Code(s): I48.91 - UNSPECIFIED ATRIAL FIBRILLATION   





(8) Abnormal liver enzymes


Assessment/Plan: 


lfts within normal limits


US with hepatomegaly-fatty liver vs hepatocellular 


US without mass 


avoid hepatotoxic agents


MRCP revealing no CBD stones, GB stone but no cholecystitis. 


hida scan showed acute lb.


CT A/P:Thickening of gallbladder with debridement, questionable cyctitis. Right 

rectus sheath hematoma 4x3x6-may be the palpable lesion that was felt earlier


will determine if  biopsy will be done 


Code(s): R74.8 - ABNORMAL LEVELS OF OTHER SERUM ENZYMES   





(9) Altered mental status


Assessment/Plan: 


resolved


Code(s): R41.82 - ALTERED MENTAL STATUS, UNSPECIFIED   





(10) Leukopenia


Assessment/Plan: 


resolved


Code(s): D72.819 - DECREASED WHITE BLOOD CELL COUNT, UNSPECIFIED   





(11) Lymphadenopathy


Assessment/Plan: 


outpatient follow up with Dr. Alfaro  for possible biopsy


Code(s): R59.1 - GENERALIZED ENLARGED LYMPH NODES   





(12) Mediastinal lymphadenopathy


Assessment/Plan: 


may need biopsy to rule to lymphoma.  heme/onc following.


Code(s): R59.0 - LOCALIZED ENLARGED LYMPH NODES   





(13) Thrombocytopenia


Assessment/Plan: 


resolved


Code(s): D69.6 - THROMBOCYTOPENIA, UNSPECIFIED   





(14) Toxic metabolic encephalopathy


Assessment/Plan: 


resolved


Code(s): G92 - TOXIC ENCEPHALOPATHY   





(15) Prophylactic measure


Assessment/Plan: 


fen


tolerating po


monitor electrolyles


low salt diet as tolerated





on eliquis 5 bid-on hold for possible invasive procedure, if no procedure 

planned, will restart





full code





Code(s): Z29.9 - ENCOUNTER FOR PROPHYLACTIC MEASURES, UNSPECIFIED   





(16) Petechial eruption


Assessment/Plan: 


petechial rash vs drug rash on right leg, thigh and right back.  improving.


no wheezing


monitor rash and respiratory status


Code(s): R23.3 - SPONTANEOUS ECCHYMOSES   





Visit type





- Emergency Visit


Emergency Visit: Yes


ED Registration Date: 11/01/19


Care time: The patient presented to the Emergency Department on the above date 

and was hospitalized for further evaluation of their emergent condition.





- New Patient


This patient is new to me today: No





- Critical Care


Critical Care patient: No





- Discharge Referral


Referred to [facility] No

## 2019-11-15 LAB
ALBUMIN SERPL-MCNC: 3.2 G/DL (ref 3.5–4.7)
IGG CSF-MCNC: 12.6 MG/DL (ref 0–8.6)
IGG SERPL-MCNC: 1425 MG/DL (ref 700–1600)
IGG/ALB CLEAR SER+CSF-RTO: 0.6 (ref 0–0.7)
IGG/ALB CSF: 0.26 {RATIO} (ref 0–0.25)

## 2019-11-18 LAB — LACTATE CSF-MCNC: 21 MG/DL (ref 10–22)

## 2019-11-19 LAB — MUV IGG SER-ACNC: < 5 AU/ML (ref ?–10.9)

## 2019-11-21 ENCOUNTER — HOSPITAL ENCOUNTER (OUTPATIENT)
Dept: HOSPITAL 74 - JER | Age: 84
Setting detail: OBSERVATION
LOS: 1 days | Discharge: HOME | End: 2019-11-22
Attending: NURSE PRACTITIONER | Admitting: GENERAL ACUTE CARE HOSPITAL
Payer: COMMERCIAL

## 2019-11-21 VITALS — BODY MASS INDEX: 29 KG/M2

## 2019-11-21 DIAGNOSIS — K57.30: ICD-10-CM

## 2019-11-21 DIAGNOSIS — D64.9: ICD-10-CM

## 2019-11-21 DIAGNOSIS — R10.9: ICD-10-CM

## 2019-11-21 DIAGNOSIS — Z97.4: ICD-10-CM

## 2019-11-21 DIAGNOSIS — Z80.0: ICD-10-CM

## 2019-11-21 DIAGNOSIS — R53.1: ICD-10-CM

## 2019-11-21 DIAGNOSIS — I48.0: ICD-10-CM

## 2019-11-21 DIAGNOSIS — Z79.01: ICD-10-CM

## 2019-11-21 DIAGNOSIS — K21.0: ICD-10-CM

## 2019-11-21 DIAGNOSIS — R74.8: ICD-10-CM

## 2019-11-21 DIAGNOSIS — K80.20: ICD-10-CM

## 2019-11-21 DIAGNOSIS — M10.9: ICD-10-CM

## 2019-11-21 DIAGNOSIS — R63.4: ICD-10-CM

## 2019-11-21 DIAGNOSIS — D12.0: ICD-10-CM

## 2019-11-21 DIAGNOSIS — I50.9: ICD-10-CM

## 2019-11-21 DIAGNOSIS — Z95.2: ICD-10-CM

## 2019-11-21 DIAGNOSIS — M19.90: ICD-10-CM

## 2019-11-21 DIAGNOSIS — G92: ICD-10-CM

## 2019-11-21 DIAGNOSIS — I25.10: ICD-10-CM

## 2019-11-21 DIAGNOSIS — Z90.79: ICD-10-CM

## 2019-11-21 DIAGNOSIS — K29.50: ICD-10-CM

## 2019-11-21 DIAGNOSIS — I11.0: ICD-10-CM

## 2019-11-21 DIAGNOSIS — Z85.46: ICD-10-CM

## 2019-11-21 DIAGNOSIS — K44.9: ICD-10-CM

## 2019-11-21 DIAGNOSIS — M54.12: ICD-10-CM

## 2019-11-21 DIAGNOSIS — D12.3: ICD-10-CM

## 2019-11-21 DIAGNOSIS — K59.00: Primary | ICD-10-CM

## 2019-11-21 LAB
ALBUMIN SERPL-MCNC: 2.5 G/DL (ref 3.4–5)
ALP SERPL-CCNC: 277 U/L (ref 45–117)
ALT SERPL-CCNC: 45 U/L (ref 13–61)
ANION GAP SERPL CALC-SCNC: 4 MMOL/L (ref 8–16)
AST SERPL-CCNC: 26 U/L (ref 15–37)
BASOPHILS # BLD: 1 % (ref 0–2)
BILIRUB SERPL-MCNC: 0.7 MG/DL (ref 0.2–1)
BUN SERPL-MCNC: 25 MG/DL (ref 7–18)
CALCIUM SERPL-MCNC: 8.6 MG/DL (ref 8.5–10.1)
CHLORIDE SERPL-SCNC: 101 MMOL/L (ref 98–107)
CO2 SERPL-SCNC: 31 MMOL/L (ref 21–32)
CREAT SERPL-MCNC: 1.2 MG/DL (ref 0.55–1.3)
DEPRECATED RDW RBC AUTO: 14.2 % (ref 11.9–15.9)
EOSINOPHIL # BLD: 2.3 % (ref 0–4.5)
GLUCOSE SERPL-MCNC: 108 MG/DL (ref 74–106)
HCT VFR BLD CALC: 31.2 % (ref 35.4–49)
HGB BLD-MCNC: 10.4 GM/DL (ref 11.7–16.9)
INR BLD: 1.34 (ref 0.83–1.09)
LYMPHOCYTES # BLD: 13.7 % (ref 8–40)
MCH RBC QN AUTO: 29.7 PG (ref 25.7–33.7)
MCHC RBC AUTO-ENTMCNC: 33.5 G/DL (ref 32–35.9)
MCV RBC: 88.7 FL (ref 80–96)
MONOCYTES # BLD AUTO: 11.3 % (ref 3.8–10.2)
NEUTROPHILS # BLD: 71.7 % (ref 42.8–82.8)
PLATELET # BLD AUTO: 315 K/MM3 (ref 134–434)
PMV BLD: 6.8 FL (ref 7.5–11.1)
POTASSIUM SERPLBLD-SCNC: 4 MMOL/L (ref 3.5–5.1)
PROT SERPL-MCNC: 5.9 G/DL (ref 6.4–8.2)
PT PNL PPP: 15.8 SEC (ref 9.7–13)
RBC # BLD AUTO: 3.51 M/MM3 (ref 4–5.6)
SODIUM SERPL-SCNC: 136 MMOL/L (ref 136–145)
WBC # BLD AUTO: 4.7 K/MM3 (ref 4–10)

## 2019-11-21 PROCEDURE — 0DB58ZX EXCISION OF ESOPHAGUS, VIA NATURAL OR ARTIFICIAL OPENING ENDOSCOPIC, DIAGNOSTIC: ICD-10-PCS | Performed by: INTERNAL MEDICINE

## 2019-11-21 PROCEDURE — G0378 HOSPITAL OBSERVATION PER HR: HCPCS

## 2019-11-21 PROCEDURE — 0DBH8ZX EXCISION OF CECUM, VIA NATURAL OR ARTIFICIAL OPENING ENDOSCOPIC, DIAGNOSTIC: ICD-10-PCS | Performed by: INTERNAL MEDICINE

## 2019-11-21 PROCEDURE — 0DB98ZX EXCISION OF DUODENUM, VIA NATURAL OR ARTIFICIAL OPENING ENDOSCOPIC, DIAGNOSTIC: ICD-10-PCS | Performed by: INTERNAL MEDICINE

## 2019-11-21 PROCEDURE — 0DBL8ZX EXCISION OF TRANSVERSE COLON, VIA NATURAL OR ARTIFICIAL OPENING ENDOSCOPIC, DIAGNOSTIC: ICD-10-PCS | Performed by: INTERNAL MEDICINE

## 2019-11-21 PROCEDURE — 0DB68ZX EXCISION OF STOMACH, VIA NATURAL OR ARTIFICIAL OPENING ENDOSCOPIC, DIAGNOSTIC: ICD-10-PCS | Performed by: INTERNAL MEDICINE

## 2019-11-21 RX ADMIN — PIPERACILLIN SODIUM AND TAZOBACTAM SODIUM SCH: .25; 2 INJECTION, POWDER, LYOPHILIZED, FOR SOLUTION INTRAVENOUS at 23:12

## 2019-11-21 RX ADMIN — PANTOPRAZOLE SODIUM SCH MG: 40 INJECTION, POWDER, FOR SOLUTION INTRAVENOUS at 23:21

## 2019-11-21 NOTE — PDOC
Documentation entered by Emelyn Holloway SCRIBE, acting as scribe for Juan Francisco Marcelo MD.








Juan Francisco Marcelo MD:  This documentation has been prepared by the Jewel liu Nirvannie, SCRIBE, under my direction and personally reviewed by me in its 

entirety.  I confirm that the documentation accurately reflects all work, 

treatment, procedures, and medical decision making performed by me.  





Attending Attestation





- Resident


Resident Name: Lindsay Andrade





- ED Attending Attestation


I have performed the following: I have examined & evaluated the patient, The 

case was reviewed & discussed with the resident, I agree w/resident's findings 

& plan, Exceptions are as noted





- HPI


HPI: 





11/21/19 10:50


CC: Constipation.





The patient is an 84 year old male, with a significant past medical history of 

hypertension, hyperlipidemia, left ventricular diastolic dysfunction, 

paroxysmal atrial fibrillation, and recent admission at [facility] for persistent 

fevers (10/31 with transfer to [facility]), who presents to the emergency department 

with 1 week of constipation.  As per patients son at bedside, he was recently 

admitted to [facility] ICU 10/31-11/14 for fevers then transferred to [facility] for 2 days then discharged. Patients son notes since his 

discharge he has not passed a bowel movement. He notes giving the patient 

Dulcolax then changed to Miralax after recommendation from GI, Dr. Miramontes, 

with minimal relief (minimal wet brown in diaper after using Miralax). Patient

s son notes calling Dr. Miramontes and was advised to report to the ED for further 

evaluation.





He denies any recent fevers, chills, headache or dizziness. He denies any 

recent nausea or vomit. He denies any recent chest pain or shortness of breath. 

He denies any recent dysuria, frequency, urgency or hematuria.





Allergies: NKDA


Past surgical history: Aortic valve replacement.


GI: Dr. Miramontes


Cardiologist: Dr. Samuel





- Physicial Exam


PE: 





11/21/19 16:15





Vitals: Triage Vital signs reviewed


General Appearance: No acute distress, well nourished well developed, 


Neck: Supple; no Nucal rigidity


Chest Wall: Nontender


Cardiac: Regular rate and rhythym, 


Lungs: Clear to auscultation bilateral, good air movement bilaterally,


Abdomen: normal bowel sounds, non tender to palpation


Extremities: Full range of motion to all extremities, no cyanosis, clubbing, or 

edema


Skin: Warm and dry, no rashes or lesions, no rash, no petechiae


Psych: Normal mood, normal affect








- Medical Decision Making





11/21/19 16:18


Patient sent in for weakness by GI has been having constipation with leakage 

concern for bowel obstruction patient sent to ED for CT abdomen pelvis IV and 

oral contrast





If negative patient will be observed overnight for colonoscopy tomorrow to rule 

out malignancy

## 2019-11-21 NOTE — PDOC
History of Present Illness





- General


Chief Complaint: Constipation


Stated Complaint: NO BOWEL MOVEMENT


Time Seen by Provider: 11/21/19 10:03


History Source: Patient, Family


Exam Limitations: Clinical Condition





- History of Present Illness


Initial Comments: 





11/21/19 10:22


Patient is an 84 year old male with PMH of HTN, HLD, prosthetic aortic valve (

porcine), diastolic dysfunction, and recent admission at [facility] for persistent 

fevers of unknown origin (10/31 with transfer to [facility]) who presents with 

constipation for 1 week. As per patients son at bedside, he was recently 

admitted to [facility] ICU 10/31-11/14 for fevers then transferred to [facility] for 2 days then discharged. Workup was all negative and pt was 

discharged. Pt has not had a BM since d/c 1 week ago. He denies any abd pain, 

changes in appetite, fevers, chills, nausea, vomiting, or any urinary symptoms. 

He has been eating well. Pt follows with Dr. Miramontes (GI). Per his 

recommendations, pt has been taking Miralax TID for 3 days, with no bowel 

movements to date. Dr. Miramontes recommended pt come to the hospital to receive a 

colonoscopy. 








11/21/19 12:12








Past History





- Travel


Traveled outside of the country in the last 30 days: No





- Past Medical History


Allergies/Adverse Reactions: 


 Allergies











Allergy/AdvReac Type Severity Reaction Status Date / Time


 


No Known Allergies Allergy   Verified 11/21/19 09:50











Home Medications: 


Ambulatory Orders





Rosuvastatin Calcium [Crestor] 5 mg PO DAILY #0 tablet 09/11/12 


Metoprolol Succinate [Toprol XL -] 25 mg PO DAILY 06/18/14 


Cyanocobalamin [Vitamin B12 -] 1,000 mcg PO DAILY 11/01/19 


Amlodipine Besylate [Norvasc -] 5 mg PO DAILY #30 tablet 11/09/19 


Furosemide [Lasix -] 40 mg PO BID@0600,1400 #60 tablet 11/11/19 


Polyethylene Glycol 3350 [Miralax 119 gm Btl -] 17 gm PO DAILY #1 bottle 11/11/ 19 


Apixaban [Eliquis] 2.5 mg PO DAILY 11/21/19 


Olmesartan Medoxomil 20 mg PO DAILY 11/21/19 








Anemia: No


Asthma: No


Cancer: Yes (PROSTATE CA)


Cardiac Disorders: Yes (Aortic Valve Prothestic,Bovine 2007)


CVA: No


COPD: No


CHF: No


DVT: No


Dementia: No


Diabetes: No


GI Disorders: No


 Disorders: No


HTN: Yes


Hypercholesterolemia: Yes


Liver Disease: No


Seizures: No


Thyroid Disease: No





- Surgical History


Abdominal Surgery: Yes (umbilical hernia)


Appendectomy: No


Cardiac Surgery: Yes (AORTIC VALVE REPLACEMENT)


Cholecystectomy: No


Lung Surgery: No


Neurologic Surgery: No


Orthopedic Surgery: No





- Immunization History


Immunization Up to Date: Yes





- Psycho Social/Smoking Cessation Hx


Smoking Status: No


Smoking History: Never smoked


Have you smoked in the past 12 months: No


Number of Cigarettes Smoked Daily: 0


If you are a former smoker, when did you quit?: 1974


Hx Alcohol Use: Yes (1-2 beers nightly previously)


Drug/Substance Use Hx: No


Substance Use Type: None


Hx Substance Use Treatment: No





**Review of Systems





- Review of Systems


Able to Perform ROS?: Yes


Constitutional: No: Chills, Fever, Loss of Appetite


HEENTM: No: Symptoms Reported, See HPI, Eye Pain, Blurred Vision, Tearing, 

Recent change in vision, Double Vision, Cataracts, Ear Pain, Ocular Prothesis, 

Ear Discharge, Nose Pain, Nose Congestion, Tinnitus, Nose Bleeding, Hearing Loss

, Throat Pain, Throat Swelling, Mouth Pain, Dental Problems, Difficulty 

Swallowing, Mouth Swelling, Other


Respiratory: No: Symptoms reported, See HPI, Cough, Orthopnea, Shortness of 

Breath, SOB with Exertion, SOB at Rest, Stridor, Wheezing, Productive cough, 

Hemoptysis, Other


Cardiac (ROS): No: Symptoms Reported, See HPI, Chest Pain, Edema, Irregular 

Heart Rate, Lightheadedness, Palpitations, Syncope, Chest Tightness, Other


ABD/GI: Yes: Constipated.  No: Symptoms Reported, See HPI, Abdominal Distended, 

Abd. Pain w/ defecation, Blood Streaked Bowels, Diarrhea, Difficulty Swallowing

, Nausea, Poor Appetite, Poor Fluid Intake, Rectal Bleeding, Vomiting, 

Indigestion, Abdominal cramping, Tarry Stools, Other


: No: Symptoms Reported, See HPI, Burning, Dysuria, Discharge, Frequency, 

Flank Pain, Hematuria, Incontinence, Pain, Urgency, Testicular Mass, Testicular 

Swelling, Lesions, Testicular Pain, Other


Musculoskeletal: No: Symptoms Reported, See HPI, Back Pain, Gout, Joint Pain, 

Joint Swelling, Muscle Pain, Muscle Weakness, Neck Pain, Joint Stiffness, Other


Neurological: No: Symptoms reported, See HPI, Headache, Numbness, Paresthesia, 

Pre-Existing Deficit, Seizure, Tingling, Tremors, Weakness, Unsteady Gait, 

Ataxia, Dizziness, Other





*Physical Exam





- Vital Signs


 Last Vital Signs











Temp Pulse Resp BP Pulse Ox


 


 98 F   56 L  18   131/57 L  98 


 


 11/21/19 09:52  11/21/19 09:52  11/21/19 09:52  11/21/19 09:52  11/21/19 09:52














- Physical Exam


General Appearance: Yes: Nourished, Appropriately Dressed


HEENT: positive: EOMI, ELIZABETH, Normal ENT Inspection, Normal Voice, Symmetrical


Neck: positive: Trachea midline


Respiratory/Chest: positive: Lungs Clear, Normal Breath Sounds.  negative: 

Respiratory Distress, Accessory Muscle Use, Wheezing


Cardiovascular: positive: Regular Rhythm, Regular Rate, S1, S2.  negative: Edema

, JVD, Murmur


Vascular Pulses: Dorsalis-Pedis (R): 2+, Doralis-Pedis (L): 2+


Gastrointestinal/Abdominal: positive: Normal Bowel Sounds, Tender (mild diffuse 

tenderness)


Rectal Exam: positive: normal exam (No hemorrhoid, masses, or hard stool. Some 

loose stool on glove, no blood)





ED Treatment Course





- LABORATORY


CBC & Chemistry Diagram: 


 11/21/19 10:46





 11/21/19 10:46





Medical Decision Making





- Medical Decision Making





11/21/19 10:50





>>Spoke with Dr. Miramontes. Pt is long overdue for colonoscopy, concerns for an 

obstructing mass. Will admit pt for colonoscopy tomorrow.


 - CBC, CMP


 - Coags


 - CT abd





11/21/19 14:03





Labs wnl


EKG: Afib





>>Spoke with Dr. Miramontes. If CT shows high grade obstruction, will start on 

GoLYTELY. Can hold eliquis today. 





11/21/19 14:27





CTAP: no acute bowel obstruction or acute pathology. Moderate amount of 

retained fecal material throughout colon. 





Will admit under Dr. Eldridge for colonoscopy tomorrow by Dr. Miramontes. 


>>Giving GoLYTEly





11/21/19 14:32





11/21/19 15:03








Discharge





- Discharge Information


Problems reviewed: Yes


Clinical Impression/Diagnosis: 


Constipation


Qualifiers:


 Constipation type: unspecified constipation type Qualified Code(s): K59.00 - 

Constipation, unspecified








- Admission


Yes





- Follow up/Referral





- Patient Discharge Instructions





- Post Discharge Activity





- Transfer to Acute Care Facility


Accepting Physician:: Silas Eldridge

## 2019-11-21 NOTE — CON.GI
Consult


Consult Specialty:: Gastroenterology


Referred by:: Dr. Marcelo


Reason for Consultation:: Constipation, pain and anemia





- History of Present Illness


Chief Complaint: Constipation and bloating pain


History of Present Illness: 





84M is admitted for constipation. He is found to have a drop in Hb as well. He 

had not moved his bowels in several days despite daily Miralax and despite TID 

Miralax dosing. He developed a bloating discomfort, nausea and unwillingness to 

eat. He presented to [facility] on 19 with fever, chills, altered mental status, 

leukopenia, thrombocytopenia, abnormal LFTs and became an FUO after a HARRIETT, MRCP

, spinal tap and multiple other studies failed to reveal an etiology and when 

SBE, cholangitis and meningitis were excluded. The symptoms arose after 

becoming drenched in a basement tunnel while attending to a plumbing problem in 

[place]. He was transferred to [facility] where no diagnosis was established. 

During this time his Hb has dropped from 15 to 13 without any overt bleeding. 

He last had a colonoscopy on 14 which revealed midl left colon 

diverticulosis and led to the removal of a hyperplastic transverse colon polyp. 

He did have a colon adenoma removed in  and his mother had stomach cancer. 

When I last saw him in the office on 18 I advised both an EGD and a 

colonoscopy to evaluate weight loss and an alteration in bowel habits as well 

as for adenoma surveillance but he never followed through. His mental status 

has improved but he not not yet fully back to baseline. He has been taking 

Eliquis.     





- History Source


History Provided By: Patient, Medical Record


Limitations to Obtaining History: Other (mild confusion)





- Past Medical History


CNS: Yes: Other (Recent alteration  in mental status with residual mild 

confusion, tinnitus, wears hearing aids)


Cardio/Vascular: Yes: AFIB, Aortic Insufficiency (bioprosthetic AVR ), Aortic 

Stenosis (moderate), CHF (LVH with EF 50-55%), HTN, Hyperlipdemia, Murmur (tr), 

Pulmonary Hypertension


Gastrointestinal: Yes: Diverticulosis, Other (prox. transverse colon adenoma 

removed , subsequent hyperplastic polyps)


Hepatobiliary: Yes: Cholelithiasis, Other (recent unexplained abnormal LFTs 

with FUO)


Renal/: Yes: Cancer (prostate  radical prostatectemy then Lupron at [facility])


Infectious Disease: Yes: MRSA (perianal abcess), Other ( neck staff 

infection= hospitalized x 1 month)


Musculoskeletal: Yes: Osteoarthritis, Other (cervical radiculopathy)


Rheumatology: Yes: Gout





- Past Surgical History


Past Surgical History: Yes: Colonoscopy, Hernia Repair (Umbilical hernia repair 

 Dr Vázquez), Laminectomy (C spine disc - Dr Israel Mckeon), Prostatectomy (

radical prostatectomy at [facility] ), Upper Endoscopy, Valve 

Replacement (bioprosthetic AVR (on Eliquis))


Additional Surgical History: left neck lipoma excision .  hemorrhoidectomy





- Alcohol/Substance Use


Hx Alcohol Use: Yes (1-2 beers nightly previously)


History of Substance Use: reports: None





- Smoking History


Smoking history: Never smoked


Have you smoked in the past 12 months: No


Aproximately how many cigarettes per day: 0


If you are a former smoker, when did you quit?: 





- Social History


Usual Living Arrangement: With Spouse


ADL: Independent


Occupation: owns active plumbing company


Place of Birth: [place]


History of Recent Travel: No





Home Medications





- Allergies


Allergies/Adverse Reactions: 


 Allergies











Allergy/AdvReac Type Severity Reaction Status Date / Time


 


No Known Allergies Allergy   Verified 19 09:50














- Home Medications


Home Medications: 


Ambulatory Orders





Rosuvastatin Calcium [Crestor] 5 mg PO DAILY #0 tablet 12 


Metoprolol Succinate [Toprol XL -] 25 mg PO DAILY 14 


Cyanocobalamin [Vitamin B12 -] 1,000 mcg PO DAILY 19 


Amlodipine Besylate [Norvasc -] 5 mg PO DAILY #30 tablet 19 


Furosemide [Lasix -] 40 mg PO BID@0600,1400 #60 tablet 19 


Polyethylene Glycol 3350 [Miralax 119 gm Btl -] 17 gm PO DAILY #1 bottle  


Apixaban [Eliquis] 2.5 mg PO DAILY 19 


Olmesartan Medoxomil 20 mg PO DAILY 19 











Family Medical History


Family Hx Cancer: Mother (gastric cancer)


Other Family History: Father  of pneumonia





Review of Systems





- Review of Systems


Constitutional: reports: Lethargy, Loss of Appetite, Malaise, Unintentional 

Wgt. Loss, Weakness


Eyes: reports: No Symptoms


HENT: reports: No Symptoms


Neck: reports: No Symptoms


Cardiovascular: reports: No Symptoms


Respiratory: reports: No Symptoms


Gastrointestinal: reports: Bloating, Constipation, Nausea


Genitourinary: reports: No Symptoms


Musculoskeletal: reports: Back Pain


Neurological: reports: Confusion





Physical Exam-GI


Vital Signs: 


 Vital Signs











Temperature  97.7 F   19 21:41


 


Pulse Rate  59 L  19 21:41


 


Respiratory Rate  18   19 21:41


 


Blood Pressure  143/68   19 21:41


 


O2 Sat by Pulse Oximetry (%)  97   19 21:00








CBC,CMP











WBC  4.7 K/mm3 (4.0-10.0)   19  10:46    


 


RBC  3.51 M/mm3 (4.00-5.60)  L  19  10:46    


 


Hgb  10.4 GM/dL (11.7-16.9)  L  19  10:46    


 


Hct  31.2 % (35.4-49)  L  19  10:46    


 


MCV  88.7 fl (80-96)   19  10:46    


 


MCH  29.7 pg (25.7-33.7)   19  10:46    


 


MCHC  33.5 g/dl (32.0-35.9)   19  10:46    


 


RDW  14.2 % (11.9-15.9)   19  10:46    


 


Plt Count  315 K/MM3 (134-434)  D 19  10:46    


 


MPV  6.8 fl (7.5-11.1)  L D 19  10:46    


 


Absolute Neuts (auto)  3.4 K/mm3 (1.5-8.0)   19  10:46    


 


Neutrophils %  71.7 % (42.8-82.8)   19  10:46    


 


Lymphocytes %  13.7 % (8-40)  D 19  10:46    


 


Monocytes %  11.3 % (3.8-10.2)  H  19  10:46    


 


Eosinophils %  2.3 % (0-4.5)   19  10:46    


 


Basophils %  1.0 % (0-2.0)   19  10:46    


 


Nucleated RBC %  0 % (0-0)   19  10:46    


 


Sodium  136 mmol/L (136-145)   19  10:46    


 


Potassium  4.0 mmol/L (3.5-5.1)   19  10:46    


 


Chloride  101 mmol/L ()   19  10:46    


 


Carbon Dioxide  31 mmol/L (21-32)   19  10:46    


 


Anion Gap  4 MMOL/L (8-16)  L  19  10:46    


 


BUN  25.0 mg/dL (7-18)  H  19  10:46    


 


Creatinine  1.2 mg/dL (0.55-1.3)   19  10:46    


 


Est GFR (CKD-EPI)AfAm  64.88   19  10:46    


 


Est GFR (CKD-EPI)NonAf  55.98   19  10:46    


 


Random Glucose  108 mg/dL ()  H  19  10:46    


 


Calcium  8.6 mg/dL (8.5-10.1)   19  10:46    


 


Total Bilirubin  0.7 mg/dL (0.2-1)   19  10:46    


 


AST  26 U/L (15-37)   19  10:46    


 


ALT  45 U/L (13-61)   19  10:46    


 


Alkaline Phosphatase  277 U/L ()  H  19  10:46    


 


Total Protein  5.9 g/dl (6.4-8.2)  L  19  10:46    


 


Albumin  2.5 g/dl (3.4-5.0)  L  19  10:46    








 Current Medications











Generic Name Dose Route Start Last Admin





  Trade Name Freq  PRN Reason Stop Dose Admin


 


Acetaminophen  650 mg  19 19:02  





  Tylenol -  PO   





  Q4H PRN   





  FEVER   





     





     





     


 


Amlodipine Besylate  5 mg  19 10:00  





  Norvasc -  PO   





  DAILY LARISA   





     





     





     





     


 


Cyanocobalamin  1,000 mcg  19 10:00  





  Vitamin B12 -  PO   





  DAILY LARISA   





     





     





     





     


 


Furosemide  40 mg  19 06:00  





  Lasix -  PO   





  BID@0600,1400 LARISA   





     





     





     





     


 


Piperacillin Sod/Tazobactam  50 mls @ 100 mls/hr  19 19:00  





  Sod 2.25 gm/ Dextrose  IVPB   





  Q8H-IV LARISA   





     





     





  Protocol   





     


 


Piperacillin Sod/Tazobactam  50 mls @ 100 mls/hr  19 19:15  





  Sod 2.25 gm/ Dextrose  IVPB  19 11:44  





  Q8H LARISA   





     





     





     





     


 


Metoprolol Succinate  25 mg  19 10:00  





  Toprol Xl -  PO   





  DAILY LARISA   





     





     





     





     


 


Pantoprazole Sodium  40 mg  19 22:00  





  Protonix Iv  IVPUSH   





  BID LARISA   





     





     





     





     


 


Rosuvastatin Calcium  5 mg  19 10:00  





  Crestor -  PO   





  DAILY LARISA   





     





     





     





     











Constitutional: Yes: Calm


Eyes: Yes: Conjunctiva Clear


HENT: Yes: Atraumatic


Neck: Yes: Trachea Midline


Cardiovascular: Yes: Pulse Irregular, Murmur (2/6 ELIZABETH at base), Other (healed 

median sternotomy)


Respiratory: Yes: CTA Bilaterally


Gastrointestinal Inspection: Yes: Distention, Hernia (nontender midline 

diastasis recti hernia), Scars (healed midline suprapubic incision), Other (

multiple sq lipomas)


...Auscultate: Yes: Normoactive Bowel Sounds, Hyperactive Bowel Sounds


...Palpate: Yes: Soft, Other (nontender)


...Percussion: Yes: Tympanitic


...Rectal Exam: Yes: Deferred (for colonoscopy tomorrow)


Musculoskeletal: Yes: Back Pain


Edema: No


Peripheral Pulses WNL: Yes


Neurological: Yes: Alert, Confusion


Labs: 


 CBC, BMP





 19 10:46 





 19 10:46 





 INR, PTT











INR  1.34  (0.83-1.09)  H  19  10:46    








 Laboratory Tests











  14





  08:30 15:10 15:10


 


Hgb   


 


Prostate Specific Ag  < 0.10  


 


Tumor Marker AFP   


 


Carcinoembryonic Ag   


 


CA 19-9 Antigen   


 


CSF Total Protein   


 


Babesia microti IgG Ab   <1:10 


 


Lyme IgG Ab Interpret   


 


CMV IgG Ab   


 


Dengue Fever IgG Ab   


 


Ehrlichia IgG Antibody   


 


West Nile Virus IgG Ab    Negative


 


Hantavirus IgG Ab   


 


Hantavirus IgM Ab   


 


Hep A IgM Ab Confirm   


 


Hepatitis A Ab Total   


 


Hep Bs Antigen   


 


Hep Bs Antibody   


 


Hep B Core Total Ab   


 


Hep B Core IgM Ab   


 


Hepatitis Be Antibody   


 


Hepatitis Be Antigen   


 


Hep C Ab Diagnostic   


 


HIV 1&2 Antibody Screen   


 


HIV P24 Antigen   


 


Leptospira IgM Antibody   














  19





  05:45 05:45 00:50


 


Hgb   15.4 


 


Prostate Specific Ag   


 


Tumor Marker AFP   


 


Carcinoembryonic Ag   


 


CA 19-9 Antigen   


 


CSF Total Protein   


 


Babesia microti IgG Ab   


 


Lyme IgG Ab Interpret   


 


CMV IgG Ab   


 


Dengue Fever IgG Ab   


 


Ehrlichia IgG Antibody  Negative  


 


West Nile Virus IgG Ab   


 


Hantavirus IgG Ab   


 


Hantavirus IgM Ab   


 


Hep A IgM Ab Confirm    Negative


 


Hepatitis A Ab Total    Negative


 


Hep Bs Antigen    Negative


 


Hep Bs Antibody    Non reactive


 


Hep B Core Total Ab    Negative


 


Hep B Core IgM Ab    Negative


 


Hepatitis Be Antibody    Negative


 


Hepatitis Be Antigen    Negative


 


Hep C Ab Diagnostic    0.2


 


HIV 1&2 Antibody Screen   


 


HIV P24 Antigen   


 


Leptospira IgM Antibody   














  19





  06:00 06:00 14:53


 


Hgb   


 


Prostate Specific Ag   


 


Tumor Marker AFP  1.9  


 


Carcinoembryonic Ag   


 


CA 19-9 Antigen   


 


CSF Total Protein    92 H


 


Babesia microti IgG Ab   


 


Lyme IgG Ab Interpret   


 


CMV IgG Ab   < 0.60 


 


Dengue Fever IgG Ab   


 


Ehrlichia IgG Antibody   


 


West Nile Virus IgG Ab   


 


Hantavirus IgG Ab   


 


Hantavirus IgM Ab   


 


Hep A IgM Ab Confirm   


 


Hepatitis A Ab Total   


 


Hep Bs Antigen   


 


Hep Bs Antibody   


 


Hep B Core Total Ab   


 


Hep B Core IgM Ab   


 


Hepatitis Be Antibody   


 


Hepatitis Be Antigen   


 


Hep C Ab Diagnostic   


 


HIV 1&2 Antibody Screen   


 


HIV P24 Antigen   


 


Leptospira IgM Antibody  Pending  














  19





  06:55 11:30 21:00


 


Hgb   


 


Prostate Specific Ag   


 


Tumor Marker AFP   


 


Carcinoembryonic Ag   


 


CA 19-9 Antigen  34  


 


CSF Total Protein   


 


Babesia microti IgG Ab   


 


Lyme IgG Ab Interpret   Negative 


 


CMV IgG Ab   


 


Dengue Fever IgG Ab   


 


Ehrlichia IgG Antibody   


 


West Nile Virus IgG Ab   


 


Hantavirus IgG Ab   


 


Hantavirus IgM Ab   


 


Hep A IgM Ab Confirm   


 


Hepatitis A Ab Total   


 


Hep Bs Antigen   


 


Hep Bs Antibody   


 


Hep B Core Total Ab   


 


Hep B Core IgM Ab   


 


Hepatitis Be Antibody   


 


Hepatitis Be Antigen   


 


Hep C Ab Diagnostic   


 


HIV 1&2 Antibody Screen   


 


HIV P24 Antigen   


 


Leptospira IgM Antibody    














  19





  06:05 06:05 06:07


 


Hgb   


 


Prostate Specific Ag   


 


Tumor Marker AFP   


 


Carcinoembryonic Ag  1.8  


 


CA 19-9 Antigen   


 


CSF Total Protein   


 


Babesia microti IgG Ab   


 


Lyme IgG Ab Interpret   


 


CMV IgG Ab   


 


Dengue Fever IgG Ab   


 


Ehrlichia IgG Antibody   


 


West Nile Virus IgG Ab   


 


Hantavirus IgG Ab   Negative 


 


Hantavirus IgM Ab   Negative 


 


Hep A IgM Ab Confirm   


 


Hepatitis A Ab Total   


 


Hep Bs Antigen   


 


Hep Bs Antibody   


 


Hep B Core Total Ab   


 


Hep B Core IgM Ab   


 


Hepatitis Be Antibody   


 


Hepatitis Be Antigen   


 


Hep C Ab Diagnostic   


 


HIV 1&2 Antibody Screen    Negative


 


HIV P24 Antigen    Negative


 


Leptospira IgM Antibody   














  19





  06:07 10:46


 


Hgb   10.4 L


 


Prostate Specific Ag  


 


Tumor Marker AFP  


 


Carcinoembryonic Ag  


 


CA 19-9 Antigen  


 


CSF Total Protein  


 


Babesia microti IgG Ab  


 


Lyme IgG Ab Interpret  


 


CMV IgG Ab  


 


Dengue Fever IgG Ab  <1.00 


 


Ehrlichia IgG Antibody  


 


West Nile Virus IgG Ab  


 


Hantavirus IgG Ab  


 


Hantavirus IgM Ab  


 


Hep A IgM Ab Confirm  


 


Hepatitis A Ab Total  


 


Hep Bs Antigen  


 


Hep Bs Antibody  


 


Hep B Core Total Ab  


 


Hep B Core IgM Ab  


 


Hepatitis Be Antibody  


 


Hepatitis Be Antigen  


 


Hep C Ab Diagnostic  


 


HIV 1&2 Antibody Screen  


 


HIV P24 Antigen  


 


Leptospira IgM Antibody  














Imaging





- Results


Cat Scan: Report Reviewed (         Final Report CT ABDOMEN & PELVIS CT WITH 

CONTR   Show Printer-Friendly Version    Patient Name: Ghazala Campos  : 02

-Aug-1935  ID: [id number]  Study Date: 2019 13:25        [facility] 

Name: GHAZALA CAMPOS  DEPARTMENT OF RADIOLOGY Phys: Lindsay Andrade 

RESIDENT   : 1935    Age: 84     Sex: M  [facility] 

Acct: [id number] Loc: [address] Exam Date: 19 Status: [place] [address] Unit Number: [id number]  [phone]      ACCESSION #:  

[id number]    EXAM#:     TYPE/EXAM: RESULT:  [id number] CT/ABDOMEN   PELVIS CT 

WITH CONTR  HISTORY PROVIDED: Rule out obstruction.     Sequential axial images 

were obtained from the domes of the diaphragms through the symphysis pubis  

following the administration of both oral and intravenous contrast material.   

  Evaluation of the lung bases demonstrates pleural based calcifications and 

increased interstitial  markings consistent with chronic lung disease and 

possibly asbestosis. No acute infiltrates or  pleural effusions are present. 

The heart is moderately enlarged.     The liver, spleen, pancreas, adrenal 

glands and kidneys demonstrate no significant abnormalities.  Small gallstones 

are identified within the gallbladder. There is no evidence of intra-abdominal 

or  retroperitoneal lymphadenopathy or fluid collections.     There is no 

evidence of pneumoperitoneum, bowel obstruction or intra-abdominal abscess.     

There is no CT evidence of acute appendicitis or diverticulitis.     Since a 

prior study of 2019, a right rectus sheath hematoma has decreased in size. 

No acute  bleed has developed.     Examination of the pelvis demonstrates no 

evidence of pelvic masses, fluid collections or  lymphadenopathy. There is a 

moderate amount of retained fecal material throughout the colon. The  patient 

is S/P prostatectomy with multiple surgical clips located within the pelvis.   

  There is no evidence of acute bony abnormalities.       IMPRESSION:     No 

evidence of bowel obstruction or acute pathology within the abdomen or pelvis. 

Please see above  discussion.             Reported By: Manuel Key MD    [id number]    Lindsay Andrade  Technologist: Brandyn Contreras  Transcribed Date/

Time: 19 142  : Manuel Key  Printed Date/Time:     

By:      Signed by: Manuel Key    Signed on: 2019 14:24)





Problem List





- Problems


(1) Abdominal pain


Code(s): R10.9 - UNSPECIFIED ABDOMINAL PAIN   





(2) Weight loss


Code(s): R63.4 - ABNORMAL WEIGHT LOSS   





(3) Constipation


Code(s): K59.00 - CONSTIPATION, UNSPECIFIED   


Qualifiers: 


   Constipation type: unspecified constipation type   Qualified Code(s): K59.00 

- Constipation, unspecified   





(4) Abnormal liver enzymes


Code(s): R74.8 - ABNORMAL LEVELS OF OTHER SERUM ENZYMES   





(5) Afib


Code(s): I48.91 - UNSPECIFIED ATRIAL FIBRILLATION   





(6) Altered mental status


Code(s): R41.82 - ALTERED MENTAL STATUS, UNSPECIFIED   





(7) Colon adenoma


Code(s): D12.6 - BENIGN NEOPLASM OF COLON, UNSPECIFIED   





(8) Diverticulosis


Code(s): K57.90 - DVRTCLOS OF INTEST, PART UNSP, W/O PERF OR ABSCESS W/O BLEED 

  





(9) Family history of gastric cancer


Code(s): Z80.0 - FAMILY HISTORY OF MALIGNANT NEOPLASM OF DIGESTIVE ORGANS   





(10) Fever of unknown origin


Code(s): R50.9 - FEVER, UNSPECIFIED   





(11) Gallstone


Code(s): K80.20 - CALCULUS OF GALLBLADDER W/O CHOLECYSTITIS W/O OBSTRUCTION   





(12) HTN (hypertension)


Code(s): I10 - ESSENTIAL (PRIMARY) HYPERTENSION   





(13) History of prostate cancer


Code(s): Z85.46 - PERSONAL HISTORY OF MALIGNANT NEOPLASM OF PROSTATE   





(14) Lymphadenopathy


Code(s): R59.1 - GENERALIZED ENLARGED LYMPH NODES   





(15) Mediastinal lymphadenopathy


Code(s): R59.0 - LOCALIZED ENLARGED LYMPH NODES   





(16) S/P aortic valve replacement


Code(s): Z95.2 - PRESENCE OF PROSTHETIC HEART VALVE   





(17) Toxic metabolic encephalopathy


Code(s): G92 - TOXIC ENCEPHALOPATHY   





Assessment/Plan





Assessment:


- Given his refractory constipation, dwindling Hb, weight loss, FH of gastirc 

cancer, personal h/o adenoma and FUO an underlying GI tract malignancy needs to 

be excluded.


- Abnormal LFTs are felt to be reactive to whatever infectious agent he had. If 

they rise a Hida scan and MRCP may need to be repeated given his gallstone 


- Diverticulosis





Plan:


- Bowel prep is in progress for an EGD and colonoscopy tomorrow. Given his 

present mild confusion consent will be obtained when his wife is present 

tomorrow. 


- Follow LFTs


- Exclude hemolysis

## 2019-11-21 NOTE — HP
CHIEF COMPLAINT:  constipation x 1 weeks, abdominal pain, increased weakness





PCP:  Alma Baron


        GI: Dr. Miramontes


        Cardiologist: Dr. Samuel





HISTORY OF PRESENT ILLNESS:


Patient is an 84 year old male with a significant past medical history of 

hypertension, hyperlipidemia, prosthetic aortic valve replacement (bovine). 

prox. afib (on eliquis), left ventricular diastolic dysfunction, and recent 

admission at [facility] for persistent fevers (10/31 with transfer to [facility] for further workup). Patient discharged to [facility], was there for 2 

days and was discharged home with PCP follow up and outpatient monitoring.  He 

presents to the ED today for one week of constipation, abdominal distention and 

pain.   Patients son notes since his discharge he has not passed a bowel 

movement and was started on dulcolax, then miralax without relief.  Patient 

reports "leaking" liquid stool, but no BM for 7 days.  Son called Dr Miramontes 

who advised patient to go to the ED for possible colonscopy and further GI 

workup, including CT imaging.  Patient denies any fevers, chills, headache or 

dizziness. He denies any recent nausea or vomit. He denies any recent chest 

pain or shortness of breath. He denies any recent dysuria, frequency, urgency 

or hematuria.





Patient's son reports that patient took all of his cardiac medications, 

including eliquis this morning.  Discussed with Dr. Samuel, who is aware 

eliquis is being held for the procedure.  Also, will not start patient on 

heparin gtt since he was given vitamin K today.  Will start on prophylactic 

antiobiotics of Zosyn pre-procedure for prosthetic aortic valve, discussed with 

Dr. Laguna.


 


ER course was notable for:


(1) given vitamin  k


(2) ct abd/pelvis. moderate stool, no obstruction


(3) anemia:Hbg has dropped from 15 to 13, denies bleeding





Recent Travel: none





PAST MEDICAL HISTORY:  hypertension, hyperlipidemia, prosthetic aortic valve 

replacement (bovine). prox. afib (on eliquis), left ventricular diastolic 

dysfunction,





PAST SURGICAL HISTORY: Aortic valve replacement.





Social History:


Smoking: denies


Alcohol: no longer drinks beer for a few months


Drugs: none





Allergies





No Known Allergies Allergy (Verified 11/21/19 09:50)


 


HOME MEDICATIONS:


 Home Medications











 Medication  Instructions  Recorded


 


Rosuvastatin Calcium [Crestor] 5 mg PO DAILY #0 tablet 09/11/12


 


Metoprolol Succinate [Toprol XL -] 25 mg PO DAILY 06/18/14


 


Cyanocobalamin [Vitamin B12 -] 1,000 mcg PO DAILY 11/01/19


 


Amlodipine Besylate [Norvasc -] 5 mg PO DAILY #30 tablet 11/09/19


 


Furosemide [Lasix -] 40 mg PO BID@0600,1400 #60 tablet 11/11/19


 


Polyethylene Glycol 3350 [Miralax 17 gm PO DAILY #1 bottle 11/11/19





119 gm Btl -]  


 


Apixaban [Eliquis] 2.5 mg PO DAILY 11/21/19


 


Olmesartan Medoxomil 20 mg PO DAILY 11/21/19





 





PHYSICAL EXAMINATION


 Vital Signs - 24 hr











  11/21/19 11/21/19





  09:52 12:55


 


Temperature 98 F 


 


Pulse Rate 56 L 


 


Pulse Rate [  56 L





Right]  


 


Respiratory 18 17





Rate  


 


Blood Pressure 131/57 L 


 


Blood Pressure  114/86





[Left]  


 


O2 Sat by Pulse 98 98





Oximetry (%)  











GENERAL: The patient is awake, alert, forgetful at times


HEAD: Normal with no signs of trauma.


EYES: PERRL, extraocular movements intact, sclera anicteric, conjunctiva clear. 

No ptosis. 


ENT: Ears normal, nares patent, oropharynx clear without exudates, moist mucous 

membranes.


NECK: Trachea midline, full range of motion, supple. 


LUNGS: Breath sounds equal, clear to auscultation bilaterally, no wheezes 


HEART: Regular rate and rhythm @ 64


ABDOMEN: Soft, nontender, mildly distended, normoactive bowel sounds, no 

guarding.  


EXTREMITIES: 2+ pulses, warm, well-perfused, no edema. 


NEUROLOGICAL:  Normal speech, gait not observed.  


PSYCH: Normal mood, normal affect.


SKIN: very mild raised pink diffused rash on upper back, improving from 

previous admission


Laboratory Results - last 24 hr











  11/21/19 11/21/19 11/21/19





  10:46 10:46 10:46


 


WBC  4.7  


 


RBC  3.51 L  


 


Hgb  10.4 L  


 


Hct  31.2 L  


 


MCV  88.7  


 


MCH  29.7  


 


MCHC  33.5  


 


RDW  14.2  


 


Plt Count  315  D  


 


MPV  6.8 L D  


 


Absolute Neuts (auto)  3.4  


 


Neutrophils %  71.7  


 


Lymphocytes %  13.7  D  


 


Monocytes %  11.3 H  


 


Eosinophils %  2.3  


 


Basophils %  1.0  


 


Nucleated RBC %  0  


 


PT with INR    15.80 H


 


INR    1.34 H


 


Sodium   136 


 


Potassium   4.0 


 


Chloride   101 


 


Carbon Dioxide   31 


 


Anion Gap   4 L 


 


BUN   25.0 H 


 


Creatinine   1.2 


 


Est GFR (CKD-EPI)AfAm   64.88 


 


Est GFR (CKD-EPI)NonAf   55.98 


 


Random Glucose   108 H 


 


Calcium   8.6 


 


Total Bilirubin   0.7 


 


AST   26 


 


ALT   45 


 


Alkaline Phosphatase   277 H 


 


Total Protein   5.9 L 


 


Albumin   2.5 L 











ASSESSMENT/PLAN:








Problem List





- Problem


(1) Abdominal pain


Assessment/Plan: 


admitted for abdominal pain and distention and is being prepped for an EGD and 

colonoscopy per GI (Dr. Miramontes).  Eliquis is being held for procedure.  No 

heparin drip since given vitamin K by GI.  


start prophylactic antibiotics per ID for prosthetic valve. 





Code(s): R10.9 - UNSPECIFIED ABDOMINAL PAIN   





(2) Constipation


Assessment/Plan: 


moderate amount of stool seen on imaging without obstruction.  for colonoscopy/

egd. 


will give clears tonight and keep npo after midnight


GI consulted.


Code(s): K59.00 - CONSTIPATION, UNSPECIFIED   


Qualifiers: 


   Constipation type: unspecified constipation type   Qualified Code(s): K59.00 

- Constipation, unspecified   





(3) Weight loss


Code(s): R63.4 - ABNORMAL WEIGHT LOSS   





(4) Weakness


Assessment/Plan: 


for pt evaluation. has home PT set up.


Code(s): R53.1 - WEAKNESS   





(5) Afib


Code(s): I48.91 - UNSPECIFIED ATRIAL FIBRILLATION   





(6) S/P aortic valve replacement


Assessment/Plan: 


eliquis being held.  cardiology following


rate controlled on metoprolol


Code(s): Z95.2 - PRESENCE OF PROSTHETIC HEART VALVE   





(7) Anemia


Code(s): D64.9 - ANEMIA, UNSPECIFIED   





(8) Prophylactic measure


Code(s): Z29.9 - ENCOUNTER FOR PROPHYLACTIC MEASURES, UNSPECIFIED   





Visit type





- Emergency Visit


Emergency Visit: Yes


ED Registration Date: 11/21/19


Care time: The patient presented to the Emergency Department on the above date 

and was hospitalized for further evaluation of their emergent condition.





- New Patient


This patient is new to me today: Yes


Date on this admission: 11/23/19





- Critical Care


Critical Care patient: No

## 2019-11-22 VITALS — TEMPERATURE: 97.7 F | HEART RATE: 69 BPM | SYSTOLIC BLOOD PRESSURE: 150 MMHG | DIASTOLIC BLOOD PRESSURE: 66 MMHG

## 2019-11-22 LAB
AMYLASE SERPL-CCNC: 108 U/L (ref 25–115)
BILIRUB DIRECT SERPL-MCNC: 193 U/L (ref 87–246)
INR BLD: 1.03 (ref 0.83–1.09)
LIPASE SERPL-CCNC: 361 U/L (ref 73–393)
PT PNL PPP: 12.1 SEC (ref 9.7–13)

## 2019-11-22 RX ADMIN — PIPERACILLIN SODIUM AND TAZOBACTAM SODIUM SCH MLS/HR: .25; 2 INJECTION, POWDER, LYOPHILIZED, FOR SOLUTION INTRAVENOUS at 17:37

## 2019-11-22 RX ADMIN — PIPERACILLIN SODIUM AND TAZOBACTAM SODIUM SCH MLS/HR: .25; 2 INJECTION, POWDER, LYOPHILIZED, FOR SOLUTION INTRAVENOUS at 10:36

## 2019-11-22 RX ADMIN — PIPERACILLIN SODIUM AND TAZOBACTAM SODIUM SCH MLS/HR: .25; 2 INJECTION, POWDER, LYOPHILIZED, FOR SOLUTION INTRAVENOUS at 03:15

## 2019-11-22 RX ADMIN — PANTOPRAZOLE SODIUM SCH MG: 40 INJECTION, POWDER, FOR SOLUTION INTRAVENOUS at 09:58

## 2019-11-22 RX ADMIN — PIPERACILLIN SODIUM AND TAZOBACTAM SODIUM SCH: .25; 2 INJECTION, POWDER, LYOPHILIZED, FOR SOLUTION INTRAVENOUS at 10:32

## 2019-11-22 NOTE — PN
Progress Note (short form)





- Note


Progress Note: 





GI Procedures NOte: Please see EGD and colonoscopy reports. No malignancies 

found. GERD above a hiatal henria, cecal and transverse colon polyps were 

found. Biopsies were taken and the polyps were removed. Family has been told to 

avoid Eliquis for 10 days and to give Pantoprazole 40mg qhs. No objections to 

discharge tonight.





Problem List





- Problems


(1) Abdominal pain


Code(s): R10.9 - UNSPECIFIED ABDOMINAL PAIN   





(2) Weight loss


Code(s): R63.4 - ABNORMAL WEIGHT LOSS   





(3) Constipation


Code(s): K59.00 - CONSTIPATION, UNSPECIFIED   


Qualifiers: 


   Constipation type: unspecified constipation type   Qualified Code(s): K59.00 

- Constipation, unspecified   





(4) Abnormal liver enzymes


Code(s): R74.8 - ABNORMAL LEVELS OF OTHER SERUM ENZYMES   





(5) Afib


Code(s): I48.91 - UNSPECIFIED ATRIAL FIBRILLATION   





(6) Altered mental status


Code(s): R41.82 - ALTERED MENTAL STATUS, UNSPECIFIED   





(7) Colon adenoma


Code(s): D12.6 - BENIGN NEOPLASM OF COLON, UNSPECIFIED   





(8) Diverticulosis


Code(s): K57.90 - DVRTCLOS OF INTEST, PART UNSP, W/O PERF OR ABSCESS W/O BLEED 

  





(9) Family history of gastric cancer


Code(s): Z80.0 - FAMILY HISTORY OF MALIGNANT NEOPLASM OF DIGESTIVE ORGANS   





(10) Fever of unknown origin


Code(s): R50.9 - FEVER, UNSPECIFIED   





(11) Gallstone


Code(s): K80.20 - CALCULUS OF GALLBLADDER W/O CHOLECYSTITIS W/O OBSTRUCTION   





(12) HTN (hypertension)


Code(s): I10 - ESSENTIAL (PRIMARY) HYPERTENSION   





(13) History of prostate cancer


Code(s): Z85.46 - PERSONAL HISTORY OF MALIGNANT NEOPLASM OF PROSTATE   





(14) Lymphadenopathy


Code(s): R59.1 - GENERALIZED ENLARGED LYMPH NODES   





(15) Mediastinal lymphadenopathy


Code(s): R59.0 - LOCALIZED ENLARGED LYMPH NODES   





(16) S/P aortic valve replacement


Code(s): Z95.2 - PRESENCE OF PROSTHETIC HEART VALVE   





(17) Toxic metabolic encephalopathy


Code(s): G92 - TOXIC ENCEPHALOPATHY

## 2019-11-22 NOTE — EKG
Test Reason : 

Blood Pressure : ***/*** mmHG

Vent. Rate : 049 BPM     Atrial Rate : 049 BPM

   P-R Int : 000 ms          QRS Dur : 096 ms

    QT Int : 486 ms       P-R-T Axes : 000 014 009 degrees

   QTc Int : 439 ms

 

ATRIAL FIBRILLATION WITH SLOW VENTRICULAR RESPONSE WITH VENTRICULAR

ESCAPE COMPLEXES

NONSPECIFIC ST ABNORMALITY

ABNORMAL ECG

 

Confirmed by PARADISE GARCIA MD ([id number]) on 11/22/2019 1:22:04 PM

 

Referred By:             Confirmed By:PARADISE GARCIA MD

## 2019-11-22 NOTE — CON.ID
Consult


Consult Specialty:: infectious diseases


Referred by:: Taya


Reason for Consultation:: abd pain





- History of Present Illness


Chief Complaint: abd pain,constipation


History of Present Illness: 





84 year old male with PMH of HTN, HLD, prosthetic aortic valve , diastolic 

dysfunction, and recent admission at [facility] for persistent fevers of unknown 

origin (10/31 with transfer to [facility]) who presents with constipation for 1 

week.


patient also mentions abd pain 


has been recommended to egd and colonoscopy


plan is for patient to undergo colonoscopy and egd today





- History Source


History Provided By: Patient


Limitations to Obtaining History: No Limitations





- Past Medical History


CNS: Yes: Other (Recent alteration  in mental status with residual mild 

confusion, tinnitus, wears hearing aids)


Cardio/Vascular: Yes: AFIB, Aortic Insufficiency (bioprosthetic AVR ), Aortic 

Stenosis (moderate), CHF (LVH with EF 50-55%), HTN, Hyperlipdemia, Murmur (tr), 

Pulmonary Hypertension


Gastrointestinal: Yes: Diverticulosis, Other (prox. transverse colon adenoma 

removed 2008, subsequent hyperplastic polyps)


Hepatobiliary: Yes: Cholelithiasis, Other (recent unexplained abnormal LFTs 

with FUO)


Renal/: Yes: Cancer (prostate 5/8/9 radical prostatectemy then Lupron at [facility])


Infectious Disease: Yes: MRSA (perianal abcess), Other (1994 neck staff 

infection= hospitalized x 1 month)


Musculoskeletal: Yes: Osteoarthritis, Other (cervical radiculopathy)


Rheumatology: Yes: Gout





- Past Surgical History


Past Surgical History: Yes: Colonoscopy, Hernia Repair (Umbilical hernia repair 

2001 Dr Vázquez), Laminectomy (C spine disc - Dr Israel Mckeon), Prostatectomy (

radical prostatectomy at [facility] 5/09), Upper Endoscopy, Valve 

Replacement (bioprosthetic AVR (on Eliquis))


Additional Surgical History: left neck lipoma excision 1992.  hemorrhoidectomy





- Alcohol/Substance Use


Hx Alcohol Use: Yes (1-2 beers nightly previously)


History of Substance Use: reports: None





- Smoking History


Smoking history: Never smoked


Have you smoked in the past 12 months: No


Aproximately how many cigarettes per day: 0


If you are a former smoker, when did you quit?: 1974





- Social History


Usual Living Arrangement: With Spouse


ADL: Independent


Occupation: owns active plumbing company


History of Recent Travel: No





Home Medications





- Allergies


Allergies/Adverse Reactions: 


 Allergies











Allergy/AdvReac Type Severity Reaction Status Date / Time


 


No Known Allergies Allergy   Verified 11/21/19 09:50














- Home Medications


Home Medications: 


Ambulatory Orders





RX: Rosuvastatin Calcium [Crestor] 5 mg PO DAILY #0 tablet 09/11/12 


RX: Metoprolol Succinate [Toprol XL -] 25 mg PO DAILY 06/18/14 


RX: Cyanocobalamin [Vitamin B12 -] 1,000 mcg PO DAILY 11/01/19 


RX: Amlodipine Besylate [Norvasc -] 5 mg PO DAILY #30 tablet 11/09/19 


RX: Furosemide [Lasix -] 40 mg PO BID@0600,1400 #60 tablet 11/11/19 


RX: Polyethylene Glycol 3350 [Miralax 119 gm Btl -] 17 gm PO DAILY #1 bottle 11/ 11/19 


Apixaban [Eliquis] 2.5 mg PO DAILY 11/21/19 


RX: Olmesartan Medoxomil 20 mg PO DAILY 11/21/19 











Review of Systems





- Review of Systems


Constitutional: reports: No Symptoms


Eyes: reports: No Symptoms


HENT: reports: No Symptoms


Neck: reports: No Symptoms


Cardiovascular: reports: No Symptoms


Respiratory: reports: No Symptoms


Gastrointestinal: reports: Abdominal Pain, Constipation


Genitourinary: reports: No Symptoms


Musculoskeletal: reports: No Symptoms


Integumentary: reports: No Symptoms


Neurological: reports: No Symptoms


Endocrine: reports: No Symptoms


Hematology/Lymphatic: reports: No Symptoms


Psychiatric: reports: No Symptoms





Physical Exam


Vital Signs: 


 Vital Signs











Temperature  98.2 F   11/22/19 05:00


 


Pulse Rate  70   11/22/19 05:00


 


Respiratory Rate  18   11/22/19 05:00


 


Blood Pressure  152/93   11/22/19 05:00


 


O2 Sat by Pulse Oximetry (%)  97   11/22/19 05:00











Constitutional: Yes: Well Nourished, No Distress, Calm


Cardiovascular: Yes: Pulse Irregular


Respiratory: Yes: Regular, CTA Bilaterally


Gastrointestinal: Yes: Normal Bowel Sounds, Soft


Musculoskeletal: Yes: WNL


Extremities: Yes: WNL


Neurological: Yes: Alert, Oriented


Psychiatric: Yes: Alert, Oriented


Labs: 


 CBC, BMP





 11/21/19 10:46 





 11/21/19 10:46 











Imaging





- Results


Cat Scan: Report Reviewed, Image Reviewed

## 2019-11-22 NOTE — DS
Physical Exam: 


SUBJECTIVE: Patient seen and examined at the bedside.  s/p colonoscopy/EGD and 

tolerated dinner.


Patient and sons aware to follow up with cardiologist and gastroenterologist as 

an outpatient.





OBJECTIVE:


Patient is an 84 year old male with a significant past medical history of 

hypertension, hyperlipidemia, prosthetic aortic valve replacement (bovine). 

prox. afib (on eliquis), left ventricular diastolic dysfunction, and recent 

admission at [facility] for persistent fevers (10/31 with transfer to [facility] for further workup). Patient discharged to [facility], was there for 2 

days and was discharged home with PCP follow up and outpatient monitoring.  He 

presents to the ED today for one week of constipation, abdominal distention and 

pain.   Patients son notes since his discharge he has not passed a bowel 

movement and was started on dulcolax, then miralax without relief.  Patient 

reports "leaking" liquid stool, but no BM for 7 days.  Son called Dr Miramontes 

who advised patient to go to the ED for EGD and colonscopy and further GI workup

, including CT imaging.  Patient denies any fevers, chills, headache or 

dizziness. He denies any recent nausea or vomit. He denies any recent chest 

pain or shortness of breath. He denies any recent dysuria, frequency, urgency 

or hematuria.





Patient had both an EGD and colonoscopy today and will be discharged home after 

procedure.  He tolerated dinner/regular diet without difficulty.  He has been 

informed by GI to stop Eliquis for 10 days total post procedure.  Patient to 

call his cardiologist next week for follow up.





Vital Signs











 Period  Temp  Pulse  Resp  BP Sys/Rosales  Pulse Ox


 


 Last 24 Hr  97.1 F-98.4 F  48-70  13-18  /49-93  








PHYSICAL EXAM


GENERAL: The patient is awake, alert, forgetful at times


HEAD: Normal with no signs of trauma.


EYES: PERRL, extraocular movements intact, sclera anicteric, conjunctiva clear. 

No ptosis. 


ENT: Ears normal, nares patent, oropharynx clear without exudates, moist mucous 

membranes.


NECK: Trachea midline, full range of motion, supple. 


LUNGS: Breath sounds equal, clear to auscultation bilaterally, no wheezes 


HEART: Regular rate and rhythm @ 64


ABDOMEN: Soft, nontender, mildly distended, normoactive bowel sounds, no 

guarding.  


EXTREMITIES: 2+ pulses, warm, well-perfused, no edema. 


NEUROLOGICAL:  Normal speech, gait not observed.  


PSYCH: Normal mood, normal affect.


SKIN: very mild raised pink diffused rash on upper back, improving from 

previous admission


LABS


 Laboratory Results - last 24 hr











  11/22/19 11/22/19 11/22/19





  07:45 07:45 07:45


 


Retic Count   1.74 H 


 


PT with INR    12.10


 


INR    1.03


 


LD Total  193  


 


C-Reactive Protein  1.2 H  


 


Total Amylase  108  


 


Lipase  361  











HOSPITAL COURSE:





Date of Admission:11/21/19





Date of Discharge: 11/22/19











Minutes to complete discharge: 45





Discharge Summary


Problems reviewed: Yes


Reason For Visit: WEAKNESS


Current Active Problems





Abdominal pain (Acute)


Constipation (Acute)


Weight loss (Acute)








Condition: Improved





- Instructions


Diet, Activity, Other Instructions: 


Mr Campos:





You will be discharged home today.  As advised by Dr. Miramontes, avoid Eliquis 

for 10 days and take Pantoprazole 40mg at bedtime.  After 10 days resume 

Eliquis 5mg TWICE per day. 





You had a colonoscopy and an EGD and biopsies were taken.  Please call Dr. Miramontes for results of the biopsy. 





Thank you for allowing us to care for you. 





Follow up with Dr Green at the beginning of this week





Referrals: 


Dewayne Miramontes MD [Staff Physician] - 


Disposition: HOME





- Home Medications


Comprehensive Discharge Medication List: 


Ambulatory Orders





Rosuvastatin Calcium [Crestor] 5 mg PO DAILY #0 tablet 09/11/12 


Metoprolol Succinate [Toprol XL -] 25 mg PO DAILY 06/18/14 


Cyanocobalamin [Vitamin B12 -] 1,000 mcg PO DAILY 11/01/19 


Amlodipine Besylate [Norvasc -] 5 mg PO DAILY #30 tablet 11/09/19 


Furosemide [Lasix -] 40 mg PO BID@0600,1400 #60 tablet 11/11/19 


Polyethylene Glycol 3350 [Miralax 119 gm Btl -] 17 gm PO DAILY #1 bottle 11/11/ 19 


Olmesartan Medoxomil 20 mg PO DAILY 11/21/19 


Amlodipine Besylate [Norvasc -] 5 mg PO DAILY  tablet 11/22/19 


Metoprolol Succinate [Toprol XL -] 25 mg PO DAILY  tab.sr.24h 11/22/19 


Pantoprazole Sodium [Protonix] 40 mg PO DAILY #90 tablet. 11/22/19 











Problem List





- Problems


(1) Constipation


Assessment/Plan: 


moderate amount of stool seen on imaging without obstruction.  s/p colonoscopy/

EGD. tolerated regular diet after.


GI follow up as an outpatient.


Code(s): K59.00 - CONSTIPATION, UNSPECIFIED   


Qualifiers: 


   Constipation type: unspecified constipation type   Qualified Code(s): K59.00 

- Constipation, unspecified   





(2) Abdominal pain


Assessment/Plan: 


abdominal pain resolved.


Code(s): R10.9 - UNSPECIFIED ABDOMINAL PAIN   





(3) Weight loss


Code(s): R63.4 - ABNORMAL WEIGHT LOSS   





(4) Prophylactic measure


Code(s): Z29.9 - ENCOUNTER FOR PROPHYLACTIC MEASURES, UNSPECIFIED   





(5) Weakness


Assessment/Plan: 


ambulated with PT, gait steady w/o assisting devices.  has PT set up at home.


Code(s): R53.1 - WEAKNESS   


This patient is new to me today: No


Emergency Visit: Yes


ED Registration Date: 11/21/19


Care time: The patient presented to the Emergency Department on the above date 

and was hospitalized for further evaluation of their emergent condition.


Critical Care patient: No





- Discharge Referral


Referred to [facility] No

## 2019-11-22 NOTE — PN
Progress Note (short form)





- Note


Progress Note: 





Came to see the patient, events noted, had fecal impaction and underwent a 

colonoscopy and polypectomy. He is discharged. Both patient and wife were told 

to call the office on monday  for an early apointment, in the interim if he 

needs me he can contact me directly.

## 2019-11-22 NOTE — EKG
Test Reason : 

Blood Pressure : ***/*** mmHG

Vent. Rate : 058 BPM     Atrial Rate : 076 BPM

   P-R Int : 000 ms          QRS Dur : 102 ms

    QT Int : 470 ms       P-R-T Axes : 000 039 008 degrees

   QTc Int : 461 ms

 

ATRIAL FIBRILLATION WITH SLOW VENTRICULAR RESPONSE WITH PREMATURE

VENTRICULAR OR ABERRANTLY CONDUCTED COMPLEXES

ABNORMAL ECG

 

Confirmed by PARADISE GARCIA MD ([id number]) on 11/22/2019 12:58:28 PM

 

Referred By: WILMA LOMELI           Confirmed By:PARADISE GARCIA MD

## 2019-11-26 NOTE — PATH
Surgical Pathology Report



Patient Name:  GHAZALA GILMORE

Accession #:  [id number]

Med. Rec. #:  [id number]                                                        

   /Age/Gender:  1935 (Age: 84) / M

Account:  [id number]                                                          

             Location: [facility] MED/SURG

Taken:  2019

Received:  2019

Reported:  2019

Physicians:  SHANNAN Resendiz MD

  



Specimen(s) Received

A: DUODENUM, SECOND PORTION AND BULB 

B: ANTRUM 

C: GE JUNCTION 

D: CECUM POLYP 

E: DISTAL TRANSVERSE POLYP 





Clinical History

Constipation, abdominal pain

Postoperative diagnosis: GERD, hiatal hernia, colon polyp, diverticulosis







Final Diagnosis

A. DUODENUM, SECOND PORTION AND BULB, BIOPSY:

DUODENAL MUCOSA WITHOUT SIGNIFICANT PATHOLOGIC FINDINGS.



B. STOMACH, ANTRUM, BIOPSY:

GASTRIC ANTRAL MUCOSA WITH MILD CHRONIC GASTRITIS.

IMMUNOHISTOCHEMICAL STAIN FOR H. PYLORI IS NEGATIVE.



C. GE JUNCTION, BIOPSY:

SQUAMOCOLUMNAR MUCOSA WITH MINIMAL CHRONIC INFLAMMATION AND CHANGES OF MILD

REFLUX ESOPHAGITIS. NO INTESTINAL METAPLASIA OR DYSPLASIA IDENTIFIED.



D. CECUM, POLYP, POLYPECTOMY:

TUBULAR ADENOMA.



E. DISTAL TRANSVERSE COLON, POLYP, POLYPECTOMY:

TUBULAR ADENOMA.







***Electronically Signed***

Kelsy Wheeler M.D.





Gross Description

A.  Received in formalin, labeled "biopsy duodenum second portion and bulb" are

2 tan, irregular portions of soft tissue averaging 0.3 cm. in greatest

dimension. The specimens are submitted in toto in one cassette.



B.  Received in formalin, labeled "biopsy antrum" are 2 tan, irregular portions

of soft tissue averaging 0.2 cm. in greatest dimension. The specimens are

submitted in toto in one cassette.



C.  Received in formalin, labeled "biopsy GE junction" is a tan, irregular

portion of soft tissue measuring 0.4 cm. in greatest dimension. The specimen is

submitted in toto in one cassette.



D.  Received in formalin, labeled "polyp cecum" are 4 tan, irregular portions of

soft tissue ranging from 0.3-0.4 cm. in greatest dimension. The specimens are

submitted in toto in one cassette.



E.  Received in formalin, labeled "polyp distal transverse" is a tan, irregular

portion of soft tissue measuring 0.4 cm. in greatest dimension. The specimen is

submitted in toto in one cassette.

DL/2019/25/2019

## 2020-03-16 NOTE — DS
OB Physical Exam: 


SUBJECTIVE: Patient seen and examined





OBJECTIVE:


transfer to [facility], medicine with consult to ID


Report given to Keyla CHASE (resident) with accepting doctor Dr. Vides


all transfer paper work sent to [facility] [place] formed filled out





Initially family requested patient to be transferred to [facility], i called twice

, spoke to coordinator Chacorta, no call back from medicine.  family then 

requested [facility], patient accepted.





reason for transfer:  fever of unknown origin, needs further workup. 


 Vital Signs











 Period  Temp  Pulse  Resp  BP Sys/Rosales  Pulse Ox


 


 Last 24 Hr  97.6 F-100.9 F  59-71  18-20  115-155/69-89  91-96








PHYSICAL EXAM


 GENERAL: The patient is awake, alert, and fully oriented, forgetful at times


HEAD: Normal with no signs of trauma.


EYES: PERRL, extraocular movements intact, sclera anicteric, conjunctiva clear. 

No ptosis. 


ENT: Ears normal, nares patent, oropharynx clear without exudates, moist mucous 

membranes.


NECK: Trachea midline, full range of motion, supple. 


LUNGS: Breath sounds equal, clear to auscultation bilaterally, no wheezes 


HEART: Regular rate and rhythm @ 64


ABDOMEN: Soft, nontender, nondistended, normoactive bowel sounds, no guarding


EXTREMITIES: 2+ pulses, warm, well-perfused, no edema. 


NEUROLOGICAL:  Normal speech, gait not observed. can stand, will order PT


PSYCH: Normal mood, normal affect.


SKIN: raised pink diffused rash that extends from right posterior leg>right 

thigh>right upper back - improved


LABS


 Laboratory Results - last 24 hr











  11/11/19 11/12/19 11/12/19





  12:40 06:55 06:55


 


WBC   


 


RBC   


 


Hgb   


 


Hct   


 


MCV   


 


MCH   


 


MCHC   


 


RDW   


 


Plt Count   


 


MPV   


 


Absolute Neuts (auto)   


 


Neutrophils %   


 


Lymphocytes %   


 


Monocytes %   


 


Eosinophils %   


 


Basophils %   


 


Nucleated RBC %   


 


Sodium   


 


Potassium   


 


Chloride   


 


Carbon Dioxide   


 


Anion Gap   


 


BUN   


 


Creatinine   


 


Est GFR (CKD-EPI)AfAm   


 


Est GFR (CKD-EPI)NonAf   


 


Random Glucose   


 


Calcium   


 


Magnesium   


 


Total Bilirubin   


 


GGT   


 


AST   


 


ALT   


 


Alkaline Phosphatase   


 


LD Total    270 H


 


Total Protein   


 


Total Protein (PEP)   5.8 L 


 


Albumin   


 


Albumin (PEP)   2.6 L 


 


Globulin   3.2 


 


Albumin/Globulin Ratio   0.8 


 


Beta Globulins   0.7 


 


LDL 1 Fraction    29.0


 


LDL 2 Fraction    34.0


 


LDL 3 Fraction    20.0


 


LDL 4 Fraction    8.0


 


LDL 5 Fraction    9.0


 


Carcinoembryonic Ag   


 


CSF West Nile IgG Ab  Negative  


 


CSF West Nile IgM Ab  Negative  


 


EPHRAIM M-Naresh    


 


Hantavirus IgG Ab   


 


Hantavirus IgM Ab   


 


HIV 1&2 Antibody Screen   


 


HIV P24 Antigen   














  11/13/19 11/13/19 11/13/19





  06:05 06:05 20:15


 


WBC   


 


RBC   


 


Hgb   


 


Hct   


 


MCV   


 


MCH   


 


MCHC   


 


RDW   


 


Plt Count   


 


MPV   


 


Absolute Neuts (auto)   


 


Neutrophils %   


 


Lymphocytes %   


 


Monocytes %   


 


Eosinophils %   


 


Basophils %   


 


Nucleated RBC %   


 


Sodium   


 


Potassium    3.5


 


Chloride   


 


Carbon Dioxide   


 


Anion Gap   


 


BUN   


 


Creatinine   


 


Est GFR (CKD-EPI)AfAm   


 


Est GFR (CKD-EPI)NonAf   


 


Random Glucose   


 


Calcium   


 


Magnesium   


 


Total Bilirubin   


 


GGT   


 


AST   


 


ALT   


 


Alkaline Phosphatase   


 


LD Total   


 


Total Protein   


 


Total Protein (PEP)   


 


Albumin   


 


Albumin (PEP)   


 


Globulin   


 


Albumin/Globulin Ratio   


 


Beta Globulins   


 


LDL 1 Fraction   


 


LDL 2 Fraction   


 


LDL 3 Fraction   


 


LDL 4 Fraction   


 


LDL 5 Fraction   


 


Carcinoembryonic Ag   1.8 


 


CSF West Nile IgG Ab   


 


CSF West Nile IgM Ab   


 


EPHRAIM M-Naresh   


 


Hantavirus IgG Ab  Cancelled  


 


Hantavirus IgM Ab  Cancelled  


 


HIV 1&2 Antibody Screen   


 


HIV P24 Antigen   














  11/14/19 11/14/19 11/14/19





  06:07 06:07 06:07


 


WBC  6.0  


 


RBC  4.10  


 


Hgb  12.2  


 


Hct  35.9  


 


MCV  87.6  


 


MCH  29.7  


 


MCHC  33.9  


 


RDW  13.7  


 


Plt Count  248  


 


MPV  7.9  


 


Absolute Neuts (auto)  4.6  


 


Neutrophils %  76.5  


 


Lymphocytes %  11.3  D  


 


Monocytes %  9.8  


 


Eosinophils %  1.9  D  


 


Basophils %  0.5  


 


Nucleated RBC %  0  


 


Sodium   134 L 


 


Potassium   3.3 L 


 


Chloride   96 L 


 


Carbon Dioxide   32 


 


Anion Gap   6 L 


 


BUN   27.7 H 


 


Creatinine   1.3 


 


Est GFR (CKD-EPI)AfAm   58.89 


 


Est GFR (CKD-EPI)NonAf   50.81 


 


Random Glucose   102 


 


Calcium   8.9 


 


Magnesium   2.5 H 


 


Total Bilirubin   2.2 H 


 


GGT    388 H


 


AST   27 


 


ALT   48 


 


Alkaline Phosphatase   355 H 


 


LD Total   


 


Total Protein   6.0 L 


 


Total Protein (PEP)   


 


Albumin   2.6 L 


 


Albumin (PEP)   


 


Globulin   


 


Albumin/Globulin Ratio   


 


Beta Globulins   


 


LDL 1 Fraction   


 


LDL 2 Fraction   


 


LDL 3 Fraction   


 


LDL 4 Fraction   


 


LDL 5 Fraction   


 


Carcinoembryonic Ag   


 


CSF West Nile IgG Ab   


 


CSF West Nile IgM Ab   


 


EPHRAIM M-Naresh   


 


Hantavirus IgG Ab   


 


Hantavirus IgM Ab   


 


HIV 1&2 Antibody Screen   


 


HIV P24 Antigen   














  11/14/19





  06:07


 


WBC 


 


RBC 


 


Hgb 


 


Hct 


 


MCV 


 


MCH 


 


MCHC 


 


RDW 


 


Plt Count 


 


MPV 


 


Absolute Neuts (auto) 


 


Neutrophils % 


 


Lymphocytes % 


 


Monocytes % 


 


Eosinophils % 


 


Basophils % 


 


Nucleated RBC % 


 


Sodium 


 


Potassium 


 


Chloride 


 


Carbon Dioxide 


 


Anion Gap 


 


BUN 


 


Creatinine 


 


Est GFR (CKD-EPI)AfAm 


 


Est GFR (CKD-EPI)NonAf 


 


Random Glucose 


 


Calcium 


 


Magnesium 


 


Total Bilirubin 


 


GGT 


 


AST 


 


ALT 


 


Alkaline Phosphatase 


 


LD Total 


 


Total Protein 


 


Total Protein (PEP) 


 


Albumin 


 


Albumin (PEP) 


 


Globulin 


 


Albumin/Globulin Ratio 


 


Beta Globulins 


 


LDL 1 Fraction 


 


LDL 2 Fraction 


 


LDL 3 Fraction 


 


LDL 4 Fraction 


 


LDL 5 Fraction 


 


Carcinoembryonic Ag 


 


CSF West Nile IgG Ab 


 


CSF West Nile IgM Ab 


 


EPHRAIM M-Naresh 


 


Hantavirus IgG Ab 


 


Hantavirus IgM Ab 


 


HIV 1&2 Antibody Screen  Negative


 


HIV P24 Antigen  Negative











HOSPITAL COURSE:





Date of Admission:11/01/19





Date of Discharge: 11/14/19











Minutes to complete discharge: 60





Discharge Summary


Problems reviewed: Yes


Reason For Visit: SHAKED/FEVER/WEAKNESS


Current Active Problems





Abnormal liver enzymes (Acute)


Afib (Acute)


Altered mental status (Acute)


Colon adenoma (Acute)


Diverticulosis (Acute)


Family history of gastric cancer (Acute)


Fever of unknown origin (Acute)


Gallstone (Acute)


History of prostate cancer (Acute)


Leukopenia (Acute)


Lymphadenopathy (Acute)


Mediastinal lymphadenopathy (Acute)


DIPTI (obstructive sleep apnea) (Acute)


Petechial eruption (Acute)


Prophylactic measure (Acute)


Severe sepsis (Acute)


Thrombocytopenia (Acute)


Toxic metabolic encephalopathy (Acute)


Weakness (Acute)








Condition: Improved





- Instructions


Diet, Activity, Other Instructions: 


 


Referrals: 


Sybil Laguna MD [Staff Physician] - 


Dewayne Miramontes MD [Staff Physician] - 1 Week (call for appointment for 1 week 

after discharge)


Scotty Alfaro MD [Staff Physician] - 


Marvin Mccloud MD [Staff Physician] - 


Yo Samuel MD [Staff Physician] - 1 Week (call for appointment for 1 week 

after discharge)


Disposition: VNS/HOME HEALTH CARE





- Home Medications


Comprehensive Discharge Medication List: 


Ambulatory Orders





Rosuvastatin Calcium [Crestor] 5 mg PO DAILY #0 tablet 09/11/12 


Metoprolol Succinate [Toprol XL -] 25 mg PO DAILY 06/18/14 


Cyanocobalamin [Vitamin B12 -] 1,000 mcg PO DAILY 11/01/19 


Amlodipine Besylate [Norvasc -] 5 mg PO DAILY #30 tablet 11/09/19 


Lidocaine 5% Patch [Lidoderm -] 1 patch TP DAILY #30 patch 11/09/19 


Methyl Salicylate/Menthol Oint [Analgesic Balm -] 1 applic TP BID #1 ea 11/09/ 19 


Pantoprazole Sodium [Protonix -] 40 mg PO BID #30 tablet.ec 11/09/19 


Furosemide [Lasix -] 40 mg PO BID@0600,1400 #60 tablet 11/11/19 


Metoprolol Succinate [Toprol XL -] 25 mg PO DAILY  tab.sr.24h 11/11/19 


Polyethylene Glycol 3350 [Miralax 119 gm Btl -] 17 gm PO DAILY #1 bottle 11/11/ 19 


Quetiapine Fumarate [Seroquel -] 12.5 mg PO DAILY@2000 #30 tablet 11/11/19 


Potassium Chloride 20 meq PO DAILY #120 tablet.er 11/12/19 











Problem List





- Problems


(1) Fever of unknown origin


Code(s): R50.9 - FEVER, UNSPECIFIED   





(2) Severe sepsis


Code(s): A41.9 - SEPSIS, UNSPECIFIED ORGANISM; R65.20 - SEVERE SEPSIS WITHOUT 

SEPTIC SHOCK   





(3) DIPTI (obstructive sleep apnea)


Code(s): G47.33 - OBSTRUCTIVE SLEEP APNEA (ADULT) (PEDIATRIC)   





(4) HLD (hyperlipidemia)


Code(s): E78.5 - HYPERLIPIDEMIA, UNSPECIFIED   





(5) HTN (hypertension)


Code(s): I10 - ESSENTIAL (PRIMARY) HYPERTENSION   





(6) S/P aortic valve replacement


Code(s): Z95.2 - PRESENCE OF PROSTHETIC HEART VALVE   





(7) Afib


Code(s): I48.91 - UNSPECIFIED ATRIAL FIBRILLATION   





(8) Abnormal liver enzymes


Code(s): R74.8 - ABNORMAL LEVELS OF OTHER SERUM ENZYMES   





(9) Altered mental status


Code(s): R41.82 - ALTERED MENTAL STATUS, UNSPECIFIED   





(10) Leukopenia


Code(s): D72.819 - DECREASED WHITE BLOOD CELL COUNT, UNSPECIFIED   





(11) Lymphadenopathy


Code(s): R59.1 - GENERALIZED ENLARGED LYMPH NODES   





(12) Mediastinal lymphadenopathy


Code(s): R59.0 - LOCALIZED ENLARGED LYMPH NODES   





(13) Thrombocytopenia


Code(s): D69.6 - THROMBOCYTOPENIA, UNSPECIFIED   





(14) Toxic metabolic encephalopathy


Code(s): G92 - TOXIC ENCEPHALOPATHY   





(15) Prophylactic measure


Code(s): Z29.9 - ENCOUNTER FOR PROPHYLACTIC MEASURES, UNSPECIFIED   





(16) Petechial eruption


Code(s): R23.3 - SPONTANEOUS ECCHYMOSES   


This patient is new to me today: No


Emergency Visit: Yes


ED Registration Date: 11/01/19


Care time: The patient presented to the Emergency Department on the above date 

and was hospitalized for further evaluation of their emergent condition.


Critical Care patient: No





- Discharge Referral


Referred to [facility] No

## 2021-09-22 NOTE — EKG
Test Reason : 

Blood Pressure : ***/*** mmHG

Vent. Rate : 068 BPM     Atrial Rate : 068 BPM

   P-R Int : 262 ms          QRS Dur : 140 ms

    QT Int : 458 ms       P-R-T Axes : -09 015 017 degrees

   QTc Int : 487 ms

 

SINUS RHYTHM WITH 1ST DEGREE A-V BLOCK

RIGHT BUNDLE BRANCH BLOCK

ABNORMAL ECG

WHEN COMPARED WITH ECG OF 04-SEP-2012 17:19,

RIGHT BUNDLE BRANCH BLOCK IS NOW PRESENT

Confirmed by FLORECITA CHASE, CURT ([id number]) on 1/27/2018 12:31:47 PM

 

Referred By: MD ESPINOSA           Confirmed By:CURT BERNABE MD Rifampin Pregnancy And Lactation Text: This medication is Pregnancy Category C and it isn't know if it is safe during pregnancy. It is also excreted in breast milk and should not be used if you are breast feeding.

## 2024-10-21 ENCOUNTER — HOSPITAL ENCOUNTER (OUTPATIENT)
Dept: HOSPITAL 74 - FER | Age: 89
Setting detail: OBSERVATION
LOS: 3 days | Discharge: HOME | End: 2024-10-24
Attending: GENERAL ACUTE CARE HOSPITAL | Admitting: INTERNAL MEDICINE
Payer: COMMERCIAL

## 2024-10-21 VITALS — BODY MASS INDEX: 29.5 KG/M2

## 2024-10-21 DIAGNOSIS — Z86.73: ICD-10-CM

## 2024-10-21 DIAGNOSIS — N39.0: Primary | ICD-10-CM

## 2024-10-21 DIAGNOSIS — Z90.79: ICD-10-CM

## 2024-10-21 DIAGNOSIS — Z87.738: ICD-10-CM

## 2024-10-21 DIAGNOSIS — I10: ICD-10-CM

## 2024-10-21 DIAGNOSIS — M19.90: ICD-10-CM

## 2024-10-21 DIAGNOSIS — Z79.01: ICD-10-CM

## 2024-10-21 DIAGNOSIS — Z95.2: ICD-10-CM

## 2024-10-21 DIAGNOSIS — M10.9: ICD-10-CM

## 2024-10-21 DIAGNOSIS — I48.91: ICD-10-CM

## 2024-10-21 DIAGNOSIS — Z85.46: ICD-10-CM

## 2024-10-21 DIAGNOSIS — R53.1: ICD-10-CM

## 2024-10-21 DIAGNOSIS — Z87.891: ICD-10-CM

## 2024-10-21 DIAGNOSIS — Z86.14: ICD-10-CM

## 2024-10-21 DIAGNOSIS — E78.5: ICD-10-CM

## 2024-10-21 DIAGNOSIS — I35.0: ICD-10-CM

## 2024-10-21 LAB
ALBUMIN SERPL-MCNC: 4 G/DL (ref 3.4–5)
ALP SERPL-CCNC: 83 U/L (ref 45–117)
ALT SERPL-CCNC: 18 U/L (ref 7–52)
ANION GAP SERPL CALC-SCNC: 6 MMOL/L (ref 4–13)
AST SERPL-CCNC: 27 U/L (ref 15–37)
BILIRUB SERPL-MCNC: 1.3 MG/DL (ref 0.2–1)
BUN SERPL-MCNC: 28 MG/DL (ref 7–18)
CALCIUM SERPL-MCNC: 9.8 MG/DL (ref 8.5–10.1)
CHLORIDE SERPL-SCNC: 102 MMOL/L (ref 98–107)
CO2 SERPL-SCNC: 29 MMOL/L (ref 21–32)
CREAT SERPL-MCNC: 1.7 MG/DL (ref 0.6–1.3)
DEPRECATED RDW RBC AUTO: 14.3 % (ref 11.9–15.9)
EPITH CASTS URNS QL MICRO: (no result) /HPF
GLUCOSE SERPL-MCNC: 99 MG/DL (ref 74–106)
HCT VFR BLD CALC: 51.3 % (ref 35.4–49)
HGB BLD-MCNC: 16.9 G/DL (ref 11.7–16.9)
MCH RBC QN AUTO: 30.5 PG (ref 25.7–33.7)
MCHC RBC AUTO-ENTMCNC: 32.9 G/DL (ref 32–35.9)
MCV RBC: 92.9 FL (ref 80–96)
PLATELET # BLD AUTO: 216.5 10^3/UL (ref 134–434)
PMV BLD: 8.1 FL (ref 7.5–11.1)
POTASSIUM SERPLBLD-SCNC: 4 MMOL/L (ref 3.5–5.1)
PROT SERPL-MCNC: 6.9 G/DL (ref 6.4–8.2)
RBC # BLD AUTO: 5.52 10^6/UL (ref 4–5.6)
SODIUM SERPL-SCNC: 137 MMOL/L (ref 136–145)
WBC # BLD AUTO: 7.2 10^3/UL (ref 4–10.8)

## 2024-10-21 PROCEDURE — 3E03329 INTRODUCTION OF OTHER ANTI-INFECTIVE INTO PERIPHERAL VEIN, PERCUTANEOUS APPROACH: ICD-10-PCS | Performed by: GENERAL ACUTE CARE HOSPITAL

## 2024-10-21 PROCEDURE — G0378 HOSPITAL OBSERVATION PER HR: HCPCS

## 2024-10-21 PROCEDURE — 3E0F7GC INTRODUCTION OF OTHER THERAPEUTIC SUBSTANCE INTO RESPIRATORY TRACT, VIA NATURAL OR ARTIFICIAL OPENING: ICD-10-PCS | Performed by: GENERAL ACUTE CARE HOSPITAL

## 2024-10-21 RX ADMIN — ROSUVASTATIN CALCIUM SCH: 20 TABLET, FILM COATED ORAL at 22:08

## 2024-10-21 RX ADMIN — CEFTRIAXONE ONE MLS/HR: 500 INJECTION, POWDER, FOR SOLUTION INTRAMUSCULAR; INTRAVENOUS at 16:05

## 2024-10-21 RX ADMIN — APIXABAN SCH: 2.5 TABLET, FILM COATED ORAL at 22:10

## 2024-10-22 LAB
ANION GAP SERPL CALC-SCNC: 8 MMOL/L (ref 4–13)
BASOPHILS # BLD: 0.8 % (ref 0–2)
BUN SERPL-MCNC: 27 MG/DL (ref 7–18)
CALCIUM SERPL-MCNC: 9.7 MG/DL (ref 8.5–10.1)
CHLORIDE SERPL-SCNC: 104 MMOL/L (ref 98–107)
CHOLEST SERPL-MCNC: 100 MG/DL (ref 50–200)
CO2 SERPL-SCNC: 27 MMOL/L (ref 21–32)
CREAT SERPL-MCNC: 1.6 MG/DL (ref 0.6–1.3)
DEPRECATED RDW RBC AUTO: 13.8 % (ref 11.9–15.9)
EOSINOPHIL # BLD: 5 % (ref 0–4.5)
GLUCOSE SERPL-MCNC: 98 MG/DL (ref 74–106)
HCT VFR BLD CALC: 47.8 % (ref 35.4–49)
HDLC SERPL-MCNC: 37 MG/DL (ref 40–60)
HGB BLD-MCNC: 16.2 GM/DL (ref 11.7–16.9)
LDLC SERPL CALC-MCNC: 46 MG/DL (ref 5–100)
LYMPHOCYTES # BLD: 20.8 % (ref 8–40)
MCH RBC QN AUTO: 30.1 PG (ref 25.7–33.7)
MCHC RBC AUTO-ENTMCNC: 33.8 G/DL (ref 32–35.9)
MCV RBC: 88.9 FL (ref 80–96)
MONOCYTES # BLD AUTO: 11.3 % (ref 3.8–10.2)
NEUTROPHILS # BLD: 62.1 % (ref 42.8–82.8)
PLATELET # BLD AUTO: 214 10^3/UL (ref 134–434)
PMV BLD: 7.7 FL (ref 7.5–11.1)
POTASSIUM SERPLBLD-SCNC: 3.5 MMOL/L (ref 3.5–5.1)
RBC # BLD AUTO: 5.38 M/MM3 (ref 4–5.6)
SODIUM SERPL-SCNC: 139 MMOL/L (ref 136–145)
WBC # BLD AUTO: 7 K/MM3 (ref 4–10)

## 2024-10-22 RX ADMIN — FUROSEMIDE SCH MG: 40 TABLET ORAL at 09:43

## 2024-10-22 RX ADMIN — CEFTRIAXONE SCH MLS/HR: 1 INJECTION, POWDER, FOR SOLUTION INTRAMUSCULAR; INTRAVENOUS at 09:43

## 2024-10-22 RX ADMIN — GABAPENTIN SCH MG: 100 CAPSULE ORAL at 09:43

## 2024-10-22 RX ADMIN — AMLODIPINE BESYLATE SCH MG: 5 TABLET ORAL at 09:43

## 2024-10-22 RX ADMIN — IPRATROPIUM BROMIDE AND ALBUTEROL SULFATE SCH AMP: .5; 3 SOLUTION RESPIRATORY (INHALATION) at 12:20

## 2024-10-23 VITALS — RESPIRATION RATE: 18 BRPM

## 2024-10-23 LAB
ALBUMIN SERPL-MCNC: 3.9 G/DL (ref 3.4–5)
ALP SERPL-CCNC: 82 U/L (ref 45–117)
ALT SERPL-CCNC: 16 U/L (ref 7–52)
ANION GAP SERPL CALC-SCNC: 7 MMOL/L (ref 4–13)
AST SERPL-CCNC: 20 U/L (ref 15–37)
BASOPHILS # BLD: 0.6 % (ref 0–2)
BILIRUB SERPL-MCNC: 1.1 MG/DL (ref 0.2–1)
BUN SERPL-MCNC: 27 MG/DL (ref 7–18)
CALCIUM SERPL-MCNC: 10.1 MG/DL (ref 8.5–10.1)
CHLORIDE SERPL-SCNC: 103 MMOL/L (ref 98–107)
CO2 SERPL-SCNC: 31 MMOL/L (ref 21–32)
CREAT SERPL-MCNC: 1.7 MG/DL (ref 0.6–1.3)
DEPRECATED RDW RBC AUTO: 14.1 % (ref 11.9–15.9)
EOSINOPHIL # BLD: 4.2 % (ref 0–4.5)
GLUCOSE SERPL-MCNC: 107 MG/DL (ref 74–106)
HCT VFR BLD CALC: 47.6 % (ref 35.4–49)
HGB BLD-MCNC: 15.9 GM/DL (ref 11.7–16.9)
LYMPHOCYTES # BLD: 19.2 % (ref 8–40)
MCH RBC QN AUTO: 29.8 PG (ref 25.7–33.7)
MCHC RBC AUTO-ENTMCNC: 33.5 G/DL (ref 32–35.9)
MCV RBC: 89 FL (ref 80–96)
MONOCYTES # BLD AUTO: 11.5 % (ref 3.8–10.2)
NEUTROPHILS # BLD: 64.5 % (ref 42.8–82.8)
PLATELET # BLD AUTO: 210 10^3/UL (ref 134–434)
PMV BLD: 7.7 FL (ref 7.5–11.1)
POTASSIUM SERPLBLD-SCNC: 3.9 MMOL/L (ref 3.5–5.1)
PROT SERPL-MCNC: 6.6 G/DL (ref 6.4–8.2)
RBC # BLD AUTO: 5.34 M/MM3 (ref 4–5.6)
SODIUM SERPL-SCNC: 141 MMOL/L (ref 136–145)
WBC # BLD AUTO: 7.2 K/MM3 (ref 4–10)

## 2024-10-23 RX ADMIN — CEFTRIAXONE SCH MLS/HR: 1 INJECTION, SOLUTION INTRAVENOUS at 09:16

## 2024-10-24 VITALS — TEMPERATURE: 97.5 F | DIASTOLIC BLOOD PRESSURE: 82 MMHG | SYSTOLIC BLOOD PRESSURE: 157 MMHG | HEART RATE: 60 BPM
